# Patient Record
Sex: MALE | Race: WHITE | NOT HISPANIC OR LATINO | Employment: PART TIME | URBAN - METROPOLITAN AREA
[De-identification: names, ages, dates, MRNs, and addresses within clinical notes are randomized per-mention and may not be internally consistent; named-entity substitution may affect disease eponyms.]

---

## 2017-01-06 ENCOUNTER — MYC REFILL (OUTPATIENT)
Dept: INTERNAL MEDICINE | Facility: CLINIC | Age: 72
End: 2017-01-06

## 2017-01-06 DIAGNOSIS — N52.9 IMPOTENCE OF ORGANIC ORIGIN: Primary | ICD-10-CM

## 2017-01-10 RX ORDER — TADALAFIL 5 MG/1
5 TABLET ORAL DAILY
Qty: 90 TABLET | Refills: 3 | Status: SHIPPED | OUTPATIENT
Start: 2017-01-10 | End: 2017-05-26

## 2017-01-10 RX ORDER — TADALAFIL 20 MG/1
TABLET ORAL
Qty: 16 TABLET | Refills: 11 | Status: SHIPPED | OUTPATIENT
Start: 2017-01-10 | End: 2017-05-26

## 2017-01-10 NOTE — TELEPHONE ENCOUNTER
Cialis 20 mg and 5 mg. He would like Rxs printed for mail order pharmacy. Sent him a message back that we can fax to Mail order pharmacy if he prefers.         Last Written Prescription Date: 4/21/16  Last Fill Quantity: 16, 90 # refills: 11, 3     Last Office Visit with [facility], [facility] or [facility] prescribing provider: 4/21/16

## 2017-01-10 NOTE — TELEPHONE ENCOUNTER
He sent back message that uses [facility] so will need to print and sign. Please let him know when this is done

## 2017-01-10 NOTE — TELEPHONE ENCOUNTER
Message from [place]:  Original authorizing provider: Roverto Edwards MD, MD Ryan Alfredo Zarina would like a refill of the following medications:  tadalafil (CIALIS) 20 MG tablet [Roverto Edwards MD, MD]  tadalafil (CIALIS) 5 MG tablet [Roverto Edwards MD, MD]    Preferred pharmacy: Other - see previous note in this request.    Comment:  I Would like t get these filled from via mail order. I believe I need a hard copy in order to accomplish this. I can  hard copy at office when ready. Text message can be sent to my cell phone: [phone]

## 2017-01-15 ENCOUNTER — MYC MEDICAL ADVICE (OUTPATIENT)
Dept: INTERNAL MEDICINE | Facility: CLINIC | Age: 72
End: 2017-01-15

## 2017-01-19 NOTE — TELEPHONE ENCOUNTER
Name of person picking up: Felix     If not patient, relationship to patient:     Type of identification: MYRNA FLORES    DL #: [id number]    What was picked up: Rx

## 2017-04-26 ENCOUNTER — MYC REFILL (OUTPATIENT)
Dept: INTERNAL MEDICINE | Facility: CLINIC | Age: 72
End: 2017-04-26

## 2017-04-26 DIAGNOSIS — I10 BENIGN ESSENTIAL HYPERTENSION: ICD-10-CM

## 2017-04-26 DIAGNOSIS — E78.5 HYPERLIPIDEMIA LDL GOAL <130: ICD-10-CM

## 2017-04-26 DIAGNOSIS — J31.0 CHRONIC RHINITIS: ICD-10-CM

## 2017-04-26 RX ORDER — SIMVASTATIN 40 MG
40 TABLET ORAL AT BEDTIME
Qty: 90 TABLET | Refills: 0 | Status: SHIPPED | OUTPATIENT
Start: 2017-04-26 | End: 2017-05-26

## 2017-04-26 RX ORDER — FLUTICASONE PROPIONATE 50 MCG
2 SPRAY, SUSPENSION (ML) NASAL DAILY
Qty: 48 G | Refills: 0 | Status: SHIPPED | OUTPATIENT
Start: 2017-04-26 | End: 2019-06-11

## 2017-04-26 RX ORDER — HYDROCHLOROTHIAZIDE 25 MG/1
25 TABLET ORAL DAILY
Qty: 90 TABLET | Refills: 0 | Status: SHIPPED | OUTPATIENT
Start: 2017-04-26 | End: 2017-05-26

## 2017-04-26 RX ORDER — ATENOLOL 50 MG/1
50 TABLET ORAL DAILY
Qty: 90 TABLET | Refills: 0 | Status: SHIPPED | OUTPATIENT
Start: 2017-04-26 | End: 2017-05-26

## 2017-05-03 ENCOUNTER — TELEPHONE (OUTPATIENT)
Dept: INTERNAL MEDICINE | Facility: CLINIC | Age: 72
End: 2017-05-03

## 2017-05-03 NOTE — TELEPHONE ENCOUNTER
Panel Management Review      Patient has the following on his problem list:       IVD   ASA: Passed    Last LDL:    Lab Results   Component Value Date    CHOL 118 04/21/2016     Lab Results   Component Value Date    HDL 33 04/21/2016     Lab Results   Component Value Date    LDL 70 04/21/2016     Lab Results   Component Value Date    TRIG 73 04/21/2016        Lab Results   Component Value Date    CHOLHDLRATIO 3.4 03/19/2015        Is the patient on a Statin? YES   Is the patient on Aspirin? YES                  Medications     HMG CoA Reductase Inhibitors    simvastatin (ZOCOR) 40 MG tablet    Salicylates    ASPIRIN 81 MG OR TABS          Last three blood pressure readings:  BP Readings from Last 3 Encounters:   04/21/16 131/82   03/19/15 136/66   03/26/14 122/68        Tobacco History:     History   Smoking Status     Never Smoker   Smokeless Tobacco     Never Used       Hypertension   Last three blood pressure readings:  BP Readings from Last 3 Encounters:   04/21/16 131/82   03/19/15 136/66   03/26/14 122/68     Blood pressure: Passed    HTN Guidelines:  Age 18-59 BP range:  Less than 140/90  Age 60-85 with Diabetes:  Less than 140/90  Age 60-85 without Diabetes:  less than 150/90      Composite cancer screening  Chart review shows that this patient is due/due soon for the following Colonoscopy  Summary:    Patient is due/failing the following:   COLONOSCOPY    Action needed:   Patient needs office visit for COLONOSCOPY      Sent letter.    Questions for provider review:    Soto Tolentino CMA       Chart routed to none .

## 2017-05-26 ENCOUNTER — OFFICE VISIT (OUTPATIENT)
Dept: INTERNAL MEDICINE | Facility: CLINIC | Age: 72
End: 2017-05-26
Payer: MEDICARE

## 2017-05-26 VITALS
SYSTOLIC BLOOD PRESSURE: 134 MMHG | DIASTOLIC BLOOD PRESSURE: 70 MMHG | BODY MASS INDEX: 35.84 KG/M2 | WEIGHT: 242 LBS | OXYGEN SATURATION: 97 % | RESPIRATION RATE: 14 BRPM | HEART RATE: 67 BPM | TEMPERATURE: 98.2 F | HEIGHT: 69 IN

## 2017-05-26 DIAGNOSIS — Z00.00 ROUTINE GENERAL MEDICAL EXAMINATION AT A HEALTH CARE FACILITY: Primary | ICD-10-CM

## 2017-05-26 DIAGNOSIS — N52.9 IMPOTENCE OF ORGANIC ORIGIN: ICD-10-CM

## 2017-05-26 DIAGNOSIS — R73.09 ELEVATED GLUCOSE: ICD-10-CM

## 2017-05-26 DIAGNOSIS — Z12.5 SPECIAL SCREENING FOR MALIGNANT NEOPLASM OF PROSTATE: ICD-10-CM

## 2017-05-26 DIAGNOSIS — Z12.11 SPECIAL SCREENING FOR MALIGNANT NEOPLASMS, COLON: ICD-10-CM

## 2017-05-26 DIAGNOSIS — E78.5 HYPERLIPIDEMIA LDL GOAL <130: ICD-10-CM

## 2017-05-26 DIAGNOSIS — E66.01 MORBID OBESITY, UNSPECIFIED OBESITY TYPE (H): ICD-10-CM

## 2017-05-26 DIAGNOSIS — I10 BENIGN ESSENTIAL HYPERTENSION: ICD-10-CM

## 2017-05-26 LAB
ERYTHROCYTE [DISTWIDTH] IN BLOOD BY AUTOMATED COUNT: 13.2 % (ref 10–15)
HBA1C MFR BLD: 6.3 % (ref 4.3–6)
HCT VFR BLD AUTO: 48.3 % (ref 40–53)
HGB BLD-MCNC: 15.8 G/DL (ref 13.3–17.7)
MCH RBC QN AUTO: 31.6 PG (ref 26.5–33)
MCHC RBC AUTO-ENTMCNC: 32.7 G/DL (ref 31.5–36.5)
MCV RBC AUTO: 97 FL (ref 78–100)
PLATELET # BLD AUTO: 102 10E9/L (ref 150–450)
RBC # BLD AUTO: 5 10E12/L (ref 4.4–5.9)
WBC # BLD AUTO: 3.1 10E9/L (ref 4–11)

## 2017-05-26 PROCEDURE — 83036 HEMOGLOBIN GLYCOSYLATED A1C: CPT | Performed by: INTERNAL MEDICINE

## 2017-05-26 PROCEDURE — G0439 PPPS, SUBSEQ VISIT: HCPCS | Performed by: INTERNAL MEDICINE

## 2017-05-26 PROCEDURE — 80053 COMPREHEN METABOLIC PANEL: CPT | Performed by: INTERNAL MEDICINE

## 2017-05-26 PROCEDURE — G0103 PSA SCREENING: HCPCS | Performed by: INTERNAL MEDICINE

## 2017-05-26 PROCEDURE — 36415 COLL VENOUS BLD VENIPUNCTURE: CPT | Performed by: INTERNAL MEDICINE

## 2017-05-26 PROCEDURE — 84443 ASSAY THYROID STIM HORMONE: CPT | Performed by: INTERNAL MEDICINE

## 2017-05-26 PROCEDURE — 80061 LIPID PANEL: CPT | Performed by: INTERNAL MEDICINE

## 2017-05-26 PROCEDURE — 85027 COMPLETE CBC AUTOMATED: CPT | Performed by: INTERNAL MEDICINE

## 2017-05-26 PROCEDURE — 90670 PCV13 VACCINE IM: CPT | Performed by: INTERNAL MEDICINE

## 2017-05-26 PROCEDURE — G0009 ADMIN PNEUMOCOCCAL VACCINE: HCPCS | Performed by: INTERNAL MEDICINE

## 2017-05-26 RX ORDER — ATENOLOL 50 MG/1
50 TABLET ORAL DAILY
Qty: 90 TABLET | Refills: 3 | Status: SHIPPED | OUTPATIENT
Start: 2017-05-26 | End: 2017-08-09

## 2017-05-26 RX ORDER — TADALAFIL 20 MG/1
TABLET ORAL
Qty: 16 TABLET | Refills: 3 | Status: SHIPPED | OUTPATIENT
Start: 2017-05-26 | End: 2019-06-11

## 2017-05-26 RX ORDER — SIMVASTATIN 40 MG
40 TABLET ORAL AT BEDTIME
Qty: 90 TABLET | Refills: 3 | Status: SHIPPED | OUTPATIENT
Start: 2017-05-26 | End: 2018-05-29

## 2017-05-26 RX ORDER — HYDROCHLOROTHIAZIDE 25 MG/1
25 TABLET ORAL DAILY
Qty: 90 TABLET | Refills: 3 | Status: SHIPPED | OUTPATIENT
Start: 2017-05-26 | End: 2018-05-29

## 2017-05-26 NOTE — NURSING NOTE
"Chief Complaint   Patient presents with     Medicare Visit       Initial /70  Pulse 67  Temp 98.2  F (36.8  C) (Oral)  Resp 14  Ht 5' 9\" (1.753 m)  Wt 242 lb (109.8 kg)  SpO2 97%  BMI 35.74 kg/m2 Estimated body mass index is 35.74 kg/(m^2) as calculated from the following:    Height as of this encounter: 5' 9\" (1.753 m).    Weight as of this encounter: 242 lb (109.8 kg).  Medication Reconciliation: complete   Rajan BAKER      "

## 2017-05-26 NOTE — PROGRESS NOTES
SUBJECTIVE:                                                            Connor Srinivasan is a 72 year old male who presents for Preventive Visit.  Colonoscopy- Patient is due for colonoscopy  Surgery-Upcoming surgery on June 12th and 28th for cataracts.  Pre-op appointment on June 7th with Dr. Moraima Barry at 6:40 am.        Exercise- No scheduled exercise however keeps tracks of walking electronically tries to hit 4-8 miles per day.   Weight has increased 4 pounds since visit on 4/21/16.       Social Hx- Currently works at [employer].    Are you in the first 12 months of your Medicare Part B coverage?  No    Healthy Habits:  Answers for HPI/ROS submitted by the patient on 5/26/2017   Annual Exam:  Getting at least 3 servings of Calcium per day:: Yes  Bi-annual eye exam:: Yes  Dental care twice a year:: Yes  Sleep apnea or symptoms of sleep apnea:: None, Daytime drowsiness  Diet:: Regular (no restrictions)  Frequency of exercise:: None  Taking medications regularly:: Yes  Medication side effects:: None  PHQ-2 Score: 0        COGNITIVE SCREEN  1) Repeat 3 items (Banana, Sunrise, Chair)    2) Clock draw: NORMAL  3) 3 item recall: Recalls 3 objects  Results: 3 items recalled: COGNITIVE IMPAIRMENT LESS LIKELY    Mini-CogTM Copyright S Yadi. Licensed by the author for use in [facility]; reprinted with permission (zoey@.[facility]). All rights reserved.                Reviewed and updated as needed this visit by clinical staff  Tobacco  Allergies  Meds  Med Hx  Surg Hx  Fam Hx  Soc Hx        Reviewed and updated as needed this visit by Provider        Social History   Substance Use Topics     Smoking status: Never Smoker     Smokeless tobacco: Never Used     Alcohol use Yes      Comment: rarely       The patient does not drink >3 drinks per day nor >7 drinks per week.    Today's PHQ-2 Score:   PHQ-2 ( 1999 Pfizer) 5/26/2017 5/26/2017   Q1: Little interest or pleasure in doing things 0 0   Q2: Feeling  down, depressed or hopeless 0 0   PHQ-2 Score 0 0   Q1: Little interest or pleasure in doing things Not at all -   Q2: Feeling down, depressed or hopeless Not at all -   PHQ-2 Score 0 -       Do you feel safe in your environment - Yes    Do you have a Health Care Directive?: No: Advance care planning was reviewed with patient; patient declined at this time.    Current providers sharing in care for this patient include:   Patient Care Team:  Roverto Edwards MD as PCP - General      Hearing impairment: Yes, wears hearing aids    Ability to successfully perform activities of daily living: Yes, no assistance needed     Fall risk:  Fallen 2 or more times in the past year?: No  Any fall with injury in the past year?: No    Home safety:  none identified      The following health maintenance items are reviewed in Epic and correct as of today:  Health Maintenance   Topic Date Due     HEPATITIS C SCREENING  03/20/1963     PNEUMOCOCCAL (1 of 2 - PCV13) 03/20/2010     AORTIC ANEURYSM SCREENING (SYSTEM ASSIGNED)  03/20/2010     INFLUENZA VACCINE (SYSTEM ASSIGNED)  09/01/2017     FALL RISK ASSESSMENT  05/26/2018     LIPID SCREEN Q5 YR MALE (SYSTEM ASSIGNED)  04/21/2021     ADVANCE DIRECTIVE PLANNING Q5 YRS  05/26/2022     COLON CANCER SCREEN (SYSTEM ASSIGNED)  04/21/2026     TETANUS Q10 YR  04/21/2026              ROS:  Constitutional, HEENT, cardiovascular, pulmonary, GI, , musculoskeletal, neuro, skin, endocrine and psych systems are negative, except as otherwise noted.    Pos: Cataracts and arthritis in thumb.  Neg: Fevers, fatigue, chills, nose & sinus issues,hearing changes, cough, difficulty breathing, heart burn, hematochezia, tight chest, dizziness, heart racing, heart skipping, hematuria, dysuria, erectile dysfunction, hemoptysis , muscle pains, skin changes, heat or cold intolerance, frequent thirst or urination, easy bruising, severe frequent headaches, numbness/tingling, and neurological changes.     OBJECTIVE:      "                                                       /70  Pulse 67  Temp 98.2  F (36.8  C) (Oral)  Resp 14  Ht 1.753 m (5' 9\")  Wt 109.8 kg (242 lb)  SpO2 97%  BMI 35.74 kg/m2 Estimated body mass index is 35.74 kg/(m^2) as calculated from the following:    Height as of this encounter: 1.753 m (5' 9\").    Weight as of this encounter: 109.8 kg (242 lb).  EXAM:   GENERAL: healthy, alert and no distress  EYES: Eyes grossly normal to inspection, PERRL and conjunctivae and sclerae normal  HENT: ear canals and TM's normal, nose and mouth without ulcers or lesions  NECK: no adenopathy, no asymmetry, masses, or scars and thyroid normal to palpation  RESP: lungs clear to auscultation - no rales, rhonchi or wheezes  CV: regular rate and rhythm, normal S1 S2, no S3 or S4, no murmur, click or rub, no peripheral edema and peripheral pulses strong   (male): normal male genitalia without lesions or urethral discharge, no hernia  RECTAL: normal sphincter tone, no rectal masses, prostate normal size, smooth, nontender without nodules or masses  ABDOMEN: soft, nontender, no hepatosplenomegaly, no masses and bowel sounds normal  MS: no gross musculoskeletal defects noted, no edema  SKIN: no suspicious lesions or rashes  NEURO: Normal strength and tone, mentation intact and speech normal  PSYCH: mentation appears normal, affect normal/bright    ASSESSMENT / PLAN:                                                                ICD-10-CM    1. Routine general medical examination at a health care facility Z00.00 Comprehensive metabolic panel     Lipid panel reflex to direct LDL     TSH with free T4 reflex     CBC with platelets     Pneumococcal vaccine 13 valent PCV13 IM (Prevnar) [82553]   2. Benign essential hypertension I10 hydrochlorothiazide (HYDRODIURIL) 25 MG tablet     atenolol (TENORMIN) 50 MG tablet   3. Hyperlipidemia LDL goal <130 E78.5 simvastatin (ZOCOR) 40 MG tablet     Comprehensive metabolic panel     Lipid " panel reflex to direct LDL   4. Special screening for malignant neoplasms, colon Z12.11 GASTROENTEROLOGY ADULT REF PROCEDURE ONLY   5. ERECTILE DYSFUNCTION N52.9 tadalafil (CIALIS) 20 MG tablet   6. Elevated glucose R73.09 Hemoglobin A1c   7. Special screening for malignant neoplasm of prostate Z12.5 Prostate spec antigen screen     (Z00.00) Routine general medical examination at a health care facility  (primary encounter diagnosis)  Comment: Stable health. See epic orders.   Plan: Comprehensive metabolic panel, Lipid panel         reflex to direct LDL, TSH with free T4 reflex,         CBC with platelets          MORBID OBESITY (BMI>35 with hypertension)  Continue regular walking, try to increase if possible. Work on food portions especially sweets/starches.     (I10) Benign essential hypertension  Comment:  BP Readings from Last 3 Encounters:   05/26/17 134/70   04/21/16 131/82   03/19/15 136/66   BP at target. Continue current meds.   Plan: hydrochlorothiazide (HYDRODIURIL) 25 MG tablet,        atenolol (TENORMIN) 50 MG tablet    (E78.5) Hyperlipidemia LDL goal <130  Comment:   Recent Labs   Lab Test  04/21/16   1115  03/19/15   1055  03/26/14   0824   CHOL  118  121  121   HDL  33*  36*  33*   LDL  70  67  73   TRIG  73  88  73   CHOLHDLRATIO   --   3.4  3.6   Update lipids.Continue current meds.   Plan: simvastatin (ZOCOR) 40 MG tablet, Comprehensive        metabolic panel, Lipid panel reflex to direct         LDL          (Z12.11) Special screening for malignant neoplasms, colon  Comment: Due for colonoscopy, ordered.   Plan: GASTROENTEROLOGY ADULT REF PROCEDURE ONLY          (N52.9) ERECTILE DYSFUNCTION  Comment: Refilled Cialis.   Plan: tadalafil (CIALIS) 20 MG tablet          (R73.09) Elevated glucose  Comment: Check glucose, A1c.  Plan: Hemoglobin A1c         (Z12.5) Special screening for malignant neoplasm of prostate  Plan: Prostate spec antigen screen            End of Life Planning:  Patient currently  "has an advanced directive: Yes.  Practitioner is supportive of decision.    COUNSELING:  Reviewed preventive health counseling, as reflected in patient instructions    BP Screening:   Last 3 BP Readings:    BP Readings from Last 3 Encounters:   05/26/17 134/70   04/21/16 131/82   03/19/15 136/66       The following was recommended to the patient:  Recommend lifestyle modifications    Estimated body mass index is 35.74 kg/(m^2) as calculated from the following:    Height as of this encounter: 1.753 m (5' 9\").    Weight as of this encounter: 109.8 kg (242 lb).  Weight management plan: Discussed healthy diet and exercise guidelines and patient will follow up in 12 months in clinic to re-evaluate.   reports that he has never smoked. He has never used smokeless tobacco.      Appropriate preventive services were discussed with this patient, including applicable screening as appropriate for cardiovascular disease, diabetes, osteopenia/osteoporosis, and glaucoma.  As appropriate for age/gender, discussed screening for colorectal cancer, prostate cancer, breast cancer, and cervical cancer. Checklist reviewing preventive services available has been given to the patient.    Reviewed patients plan of care and provided an AVS. The Basic Care Plan (routine screening as documented in Health Maintenance) for Connor meets the Care Plan requirement. This Care Plan has been established and reviewed with the Patient.       Patient Instructions   Everything looks fine!    Refills of medications have been faxed to your pharmacy.     I'll get back to you with lab results soon, especially if there is anything of concern.      See you in a year (unless labs return in diabetes range), sooner if problems.        Counseling Resources:  ATP IV Guidelines  Pooled Cohorts Equation Calculator  Breast Cancer Risk Calculator  FRAX Risk Assessment  ICSI Preventive Guidelines  Dietary Guidelines for Americans, 2010  USDA's MyPlate  ASA " Prophylaxis  Lung CA Screening    Roverto Edwards MD,   [facility]    This document serves as a record of the services and decisions personally performed and made by Roverto Edwards MD. It was created on their behalf by Adi Bonilla, a trained medical scribe. The creation of this document is based the provider's statements to the medical scribe.  Adi Bonilla May 26, 2017 11:00 AM

## 2017-05-26 NOTE — MR AVS SNAPSHOT
After Visit Summary   5/26/2017    Connor Srinivasan    MRN: [id number]           Patient Information     Date Of Birth          1945        Visit Information        Provider Department      5/26/2017 9:00 AM Roverto Edwards MD [facility]        Today's Diagnoses     Routine general medical examination at a health care facility    -  1    Benign essential hypertension        Hyperlipidemia LDL goal <130        Special screening for malignant neoplasms, colon        ERECTILE DYSFUNCTION        Elevated glucose        Special screening for malignant neoplasm of prostate          Care Instructions      Preventive Health Recommendations:       Male Ages 65 and over    Yearly exam:             See your health care provider every year in order to  o   Review health changes.   o   Discuss preventive care.    o   Review your medicines if your doctor has prescribed any.    Talk with your health care provider about whether you should have a test to screen for prostate cancer (PSA).    Every 3 years, have a diabetes test (fasting glucose). If you are at risk for diabetes, you should have this test more often.    Every 5 years, have a cholesterol test. Have this test more often if you are at risk for high cholesterol or heart disease.     Every 10 years, have a colonoscopy. Or, have a yearly FIT test (stool test). These exams will check for colon cancer.    Talk to with your health care provider about screening for Abdominal Aortic Aneurysm if you have a family history of AAA or have a history of smoking.  Shots:     Get a flu shot each year.     Get a tetanus shot every 10 years.     Talk to your doctor about your pneumonia vaccines. There are now two you should receive - Pneumovax (PPSV 23) and Prevnar (PCV 13).    Talk to your doctor about a shingles vaccine.     Talk to your doctor about the hepatitis B vaccine.  Nutrition:     Eat at least 5 servings of fruits and vegetables each  day.     Eat whole-grain bread, whole-wheat pasta and brown rice instead of white grains and rice.     Talk to your doctor about Calcium and Vitamin D.   Lifestyle    Exercise for at least 150 minutes a week (30 minutes a day, 5 days a week). This will help you control your weight and prevent disease.     Limit alcohol to one drink per day.     No smoking.     Wear sunscreen to prevent skin cancer.     See your dentist every six months for an exam and cleaning.     See your eye doctor every 1 to 2 years to screen for conditions such as glaucoma, macular degeneration and cataracts.      Everything looks fine!    Refills of medications have been faxed to your pharmacy.     I'll get back to you with lab results soon, especially if there is anything of concern.      See you in a year (unless labs return in diabetes range), sooner if problems.            Follow-ups after your visit        Additional Services     GASTROENTEROLOGY ADULT REF PROCEDURE ONLY       Last Lab Result: Creatinine (mg/dL)       Date                     Value                 04/21/2016               0.82             ----------  Body mass index is 35.74 kg/(m^2).      Patient will be contacted to schedule procedure.     Please be aware that coverage of these services is subject to the terms and limitations of your health insurance plan.  Call member services at your health plan with any benefit or coverage questions.  Any procedures must be performed at a [facility] facility OR coordinated by your clinic's referral office.    Please bring the following with you to your appointment:    (1) Any X-Rays, CTs or MRIs which have been performed.  Contact the facility where they were done to arrange for  prior to your scheduled appointment.    (2) List of current medications   (3) This referral request   (4) Any documents/labs given to you for this referral                  Your next 10 appointments already scheduled     Jun 07, 2017  6:40 AM CDT    [facility] with Dilma [facility] NP   [facility]    [address]  [facility] [id number] [phone]            Jun 12, 2017   Procedure with John Sanchez MD   [facility] (--)    [address]  [place] [address] [phone]            Jun 28, 2017   Procedure with John Sanchez MD   [facility] (--)    [address]  [facility] [address] [phone]              Who to contact     If you have questions or need follow up information about today's clinic visit or your schedule please contact [facility] directly at [phone].  Normal or non-critical lab and imaging results will be communicated to you by MyChart, letter or phone within 4 business days after the clinic has received the results. If you do not hear from us within 7 days, please contact the clinic through [employer] or phone. If you have a critical or abnormal lab result, we will notify you by phone as soon as possible.  Submit refill requests through [facility] or call your pharmacy and they will forward the refill request to us. Please allow 3 business days for your refill to be completed.          Additional Information About Your Visit        MyChart Information     [facility] gives you secure access to your electronic health record. If you see a primary care provider, you can also send messages to your care team and make appointments. If you have questions, please call your primary care clinic.  If you do not have a primary care provider, please call [phone] and they will assist you.        Care EveryWhere ID     This is your Care EveryWhere ID. This could be used by other organizations to access your [facility] medical records  [id number]        Your Vitals Were     Pulse Temperature Respirations Height Pulse Oximetry BMI (Body Mass Index)    67 98.2  F (36.8  C)  "(Oral) 14 5' 9\" (1.753 m) 97% 35.74 kg/m2       Blood Pressure from Last 3 Encounters:   05/26/17 134/70   04/21/16 131/82   03/19/15 136/66    Weight from Last 3 Encounters:   05/26/17 242 lb (109.8 kg)   04/21/16 238 lb (108 kg)   03/19/15 235 lb 3.2 oz (106.7 kg)              We Performed the Following     CBC with platelets     Comprehensive metabolic panel     GASTROENTEROLOGY ADULT REF PROCEDURE ONLY     Hemoglobin A1c     Lipid panel reflex to direct LDL     Prostate spec antigen screen     TSH with free T4 reflex          Where to get your medicines      These medications were sent to [id number] - [place], [place] - [address]  [address], [facility] [address]     Phone:  [phone]     atenolol 50 MG tablet    hydrochlorothiazide 25 MG tablet    simvastatin 40 MG tablet    tadalafil 20 MG tablet          Primary Care Provider Office Phone # Fax #    Roverto Edwards [phone]       [facility] [address]  [facility] [address]        Thank you!     Thank you for choosing [facility]  for your care. Our goal is always to provide you with excellent care. Hearing back from our patients is one way we can continue to improve our services. Please take a few minutes to complete the written survey that you may receive in the mail after your visit with us. Thank you!             Your Updated Medication List - Protect others around you: Learn how to safely use, store and throw away your medicines at www.disposemymeds.org.          This list is accurate as of: 5/26/17  9:46 AM.  Always use your most recent med list.                   Brand Name Dispense Instructions for use    aspirin 81 MG tablet     100    ONE DAILY       atenolol 50 MG tablet    TENORMIN    90 tablet    Take 1 tablet (50 mg) by mouth daily       fluticasone 50 MCG/ACT spray    FLONASE    48 g    Spray 2 sprays into both nostrils daily       hydrochlorothiazide 25 MG " tablet    HYDRODIURIL    90 tablet    Take 1 tablet (25 mg) by mouth daily       sildenafil 100 MG cap/tab    REVATIO/VIAGRA    16 tablet    Take 1 tablet (100 mg) by mouth daily as needed for erectile dysfunction Take at least 30 min to 4 hours before intercourse.       simvastatin 40 MG tablet    ZOCOR    90 tablet    Take 1 tablet (40 mg) by mouth At Bedtime       * tadalafil 5 MG tablet    CIALIS    30 tablet    Take 1 tablet (5 mg) by mouth daily       * tadalafil 20 MG tablet    CIALIS    16 tablet    Take one tablet by mouth. TAKE AT LEAST 30 MINUTES BEFORE INTERCOURSE,       * Notice:  This list has 2 medication(s) that are the same as other medications prescribed for you. Read the directions carefully, and ask your doctor or other care provider to review them with you.

## 2017-05-26 NOTE — PATIENT INSTRUCTIONS
Everything looks fine!    Refills of medications have been faxed to your pharmacy.     I'll get back to you with lab results soon, especially if there is anything of concern.      See you in a year (unless labs return in diabetes range), sooner if problems.

## 2017-05-27 LAB
ALBUMIN SERPL-MCNC: 3.9 G/DL (ref 3.4–5)
ALP SERPL-CCNC: 93 U/L (ref 40–150)
ALT SERPL W P-5'-P-CCNC: 62 U/L (ref 0–70)
ANION GAP SERPL CALCULATED.3IONS-SCNC: 10 MMOL/L (ref 3–14)
AST SERPL W P-5'-P-CCNC: 29 U/L (ref 0–45)
BILIRUB SERPL-MCNC: 0.9 MG/DL (ref 0.2–1.3)
BUN SERPL-MCNC: 20 MG/DL (ref 7–30)
CALCIUM SERPL-MCNC: 9 MG/DL (ref 8.5–10.1)
CHLORIDE SERPL-SCNC: 108 MMOL/L (ref 94–109)
CHOLEST SERPL-MCNC: 117 MG/DL
CO2 SERPL-SCNC: 24 MMOL/L (ref 20–32)
CREAT SERPL-MCNC: 0.73 MG/DL (ref 0.66–1.25)
GFR SERPL CREATININE-BSD FRML MDRD: ABNORMAL ML/MIN/1.7M2
GLUCOSE SERPL-MCNC: 133 MG/DL (ref 70–99)
HDLC SERPL-MCNC: 26 MG/DL
LDLC SERPL CALC-MCNC: 69 MG/DL
NONHDLC SERPL-MCNC: 91 MG/DL
POTASSIUM SERPL-SCNC: 4 MMOL/L (ref 3.4–5.3)
PROT SERPL-MCNC: 6.4 G/DL (ref 6.8–8.8)
PSA SERPL-ACNC: 0.7 UG/L (ref 0–4)
SODIUM SERPL-SCNC: 142 MMOL/L (ref 133–144)
TRIGL SERPL-MCNC: 108 MG/DL
TSH SERPL DL<=0.005 MIU/L-ACNC: 2.03 MU/L (ref 0.4–4)

## 2017-05-28 PROBLEM — E66.01 MORBID OBESITY, UNSPECIFIED OBESITY TYPE (H): Status: ACTIVE | Noted: 2017-05-28

## 2017-06-07 ENCOUNTER — OFFICE VISIT (OUTPATIENT)
Dept: INTERNAL MEDICINE | Facility: CLINIC | Age: 72
End: 2017-06-07
Payer: MEDICARE

## 2017-06-07 VITALS
HEART RATE: 75 BPM | SYSTOLIC BLOOD PRESSURE: 114 MMHG | TEMPERATURE: 98.1 F | OXYGEN SATURATION: 95 % | DIASTOLIC BLOOD PRESSURE: 70 MMHG | RESPIRATION RATE: 16 BRPM | HEIGHT: 69 IN | WEIGHT: 242.8 LBS | BODY MASS INDEX: 35.96 KG/M2

## 2017-06-07 DIAGNOSIS — Z01.818 PREOP GENERAL PHYSICAL EXAM: Primary | ICD-10-CM

## 2017-06-07 PROCEDURE — 99213 OFFICE O/P EST LOW 20 MIN: CPT | Performed by: NURSE PRACTITIONER

## 2017-06-07 NOTE — MR AVS SNAPSHOT
After Visit Summary   6/7/2017    Connor Srinivasan    MRN: [id number]           Patient Information     Date Of Birth          1945        Visit Information        Provider Department      6/7/2017 6:40 AM Dlima Barry NP [facility]        Today's Diagnoses     Preop general physical exam    -  1      Care Instructions      Before Your Surgery      Call your surgeon if there is any change in your health. This includes signs of a cold or flu (such as a sore throat, runny nose, cough, rash or fever).    Do not smoke, drink alcohol or take over the counter medicine (unless your surgeon or primary care doctor tells you to) for the 24 hours before and after surgery.    If you take prescribed drugs: Follow your doctor s orders about which medicines to take and which to stop until after surgery.    Eating and drinking prior to surgery: follow the instructions from your surgeon    Take a shower or bath the night before surgery. Use the soap your surgeon gave you to gently clean your skin. If you do not have soap from your surgeon, use your regular soap. Do not shave or scrub the surgery site.  Wear clean pajamas and have clean sheets on your bed.           Follow-ups after your visit        Your next 10 appointments already scheduled     Jun 12, 2017   Procedure with John Sanchez MD   [facility] (--)    [address]  [facility] [id number] [phone]            Jun 28, 2017   Procedure with John Sanchez MD   [facility] (--)    [address]  [facility] [id number] [phone]              Who to contact     If you have questions or need follow up information about today's clinic visit or your schedule please contact [facility] directly at [phone].  Normal or non-critical lab and imaging results will be communicated to you by MyChart, letter or phone within  "4 business days after the clinic has received the results. If you do not hear from us within 7 days, please contact the clinic through [employer] or phone. If you have a critical or abnormal lab result, we will notify you by phone as soon as possible.  Submit refill requests through [employer] or call your pharmacy and they will forward the refill request to us. Please allow 3 business days for your refill to be completed.          Additional Information About Your Visit        [employer] Information     [employer] gives you secure access to your electronic health record. If you see a primary care provider, you can also send messages to your care team and make appointments. If you have questions, please call your primary care clinic.  If you do not have a primary care provider, please call [phone] and they will assist you.        Care EveryWhere ID     This is your Care EveryWhere ID. This could be used by other organizations to access your [facility] medical records  [id number]        Your Vitals Were     Pulse Temperature Respirations Height Pulse Oximetry BMI (Body Mass Index)    75 98.1  F (36.7  C) (Oral) 16 5' 9\" (1.753 m) 95% 35.86 kg/m2       Blood Pressure from Last 3 Encounters:   06/07/17 114/70   05/26/17 134/70   04/21/16 131/82    Weight from Last 3 Encounters:   06/07/17 242 lb 12.8 oz (110.1 kg)   05/26/17 242 lb (109.8 kg)   04/21/16 238 lb (108 kg)              Today, you had the following     No orders found for display       Primary Care Provider Office Phone # Fax #    Roverto Edwards [phone]       [facility] [address] [facility] [address]  [facility] [address]        Thank you!     Thank you for choosing [facility]  for your care. Our goal is always to provide you with excellent care. Hearing back from our patients is one way we can continue to improve our services. Please take a few minutes to complete the written survey that you may receive in the mail after " your visit with us. Thank you!             Your Updated Medication List - Protect others around you: Learn how to safely use, store and throw away your medicines at www.disposemymeds.org.          This list is accurate as of: 6/7/17  7:22 AM.  Always use your most recent med list.                   Brand Name Dispense Instructions for use    aspirin 81 MG tablet     100    ONE DAILY       atenolol 50 MG tablet    TENORMIN    90 tablet    Take 1 tablet (50 mg) by mouth daily       fluticasone 50 MCG/ACT spray    FLONASE    48 g    Spray 2 sprays into both nostrils daily       hydrochlorothiazide 25 MG tablet    HYDRODIURIL    90 tablet    Take 1 tablet (25 mg) by mouth daily       sildenafil 100 MG cap/tab    REVATIO/VIAGRA    16 tablet    Take 1 tablet (100 mg) by mouth daily as needed for erectile dysfunction Take at least 30 min to 4 hours before intercourse.       simvastatin 40 MG tablet    ZOCOR    90 tablet    Take 1 tablet (40 mg) by mouth At Bedtime       * tadalafil 5 MG tablet    CIALIS    30 tablet    Take 1 tablet (5 mg) by mouth daily       * tadalafil 20 MG tablet    CIALIS    16 tablet    Take one tablet by mouth. TAKE AT LEAST 30 MINUTES BEFORE INTERCOURSE,       * Notice:  This list has 2 medication(s) that are the same as other medications prescribed for you. Read the directions carefully, and ask your doctor or other care provider to review them with you.

## 2017-06-07 NOTE — PROGRESS NOTES
[place] [address]  [facility] [address]  [phone]  Dept: [phone]    PRE-OP EVALUATION:  Today's date: 2017    Connor Srinivasan (: 1945) presents for pre-operative evaluation assessment as requested by Dr. BENSON Jama.  He requires evaluation and anesthesia risk assessment prior to undergoing surgery/procedure for treatment of R eye cataract .  Proposed procedure: R ey cataract extraction    Date of Surgery/ Procedure: 17 and 17  Time of Surgery/ Procedure: 9 am on 17 and 1:00 on 17  Hospital/Surgical Facility: [facility]  Primary Physician: Roverto Edwards  Type of Anesthesia Anticipated: to be determined    Patient has a Health Care Directive or Living Will:  NO    Preop Questions 2017   1.  Do you have a history of heart attack, stroke, stent, bypass or surgery on an artery in the head, neck, heart or legs? No   2.  Do you ever have any pain or discomfort in your chest? No   3.  Do you have a history of  Heart Failure? No   4.   Are you troubled by shortness of breath when:  walking on a level surface, or up a slight hill, or at night? No   5.  Do you currently have a cold, bronchitis or other respiratory infection? No   6.  Do you have a cough, shortness of breath, or wheezing? No   7.  Do you sometimes get pains in the calves of your legs when you walk? No   8. Do you or anyone in your family have previous history of blood clots? No   9.  Do you or does anyone in your family have a serious bleeding problem such as prolonged bleeding following surgeries or cuts? No   10. Have you ever had problems with anemia or been told to take iron pills? No   11. Have you had any abnormal blood loss such as black, tarry or bloody stools? No   12. Have you ever had a blood transfusion? No   13. Have you or any of your relatives ever had problems with anesthesia? No   14. Do you have sleep apnea, excessive snoring or daytime drowsiness? No    15. Do you have any prosthetic heart valves? No   16. Do you have prosthetic joints? YES - bilateral knee replacements         HPI:                                                      Brief HPI related to upcoming procedure: R eye cataract      HYPERTENSION - Patient has longstanding history of mod-severe HTN , currently denies any symptoms referable to elevated blood pressure. Specifically denies chest pain, palpitations, dyspnea, orthopnea, PND or peripheral edema. Blood pressure readings have been in normal range. Current medication regimen is as listed below. Patient denies any side effects of medication.                                                                                                                                                                                          .  HYPERLIPIDEMIA - Patient has a long history of significant Hyperlipidemia requiring medication for treatment with recent good control. Patient reports no problems or side effects with the medication.                                                                                                                                                       .    MEDICAL HISTORY:                                                      Patient Active Problem List    Diagnosis Date Noted     Obesity, BMI>35 with comorbidity 05/28/2017     Priority: Medium     Other abnormal glucose 07/03/2016     Priority: Medium     Health Care Home 02/29/2012     Priority: Medium     FPA [employer] for .  Vonda Scott RN-BSN, [facility]  [phone]   DX V65.8 REPLACED WITH [id number]ALTH CARE HOME (04/08/2013)       Advanced directives, counseling/discussion 04/29/2011     Priority: Medium     Parent voices understanding and acceptance of this advice and will call back if any further questions or concerns.         HYPERLIPIDEMIA LDL GOAL <130 10/31/2010     Priority: Medium     Overweight 04/29/2009     Priority: Medium     ERECTILE  DYSFUNCTION 01/18/2008     Priority: Medium     Chronic rhinitis      Priority: Medium     Essential hypertension 11/23/2004     Priority: Medium     Problem list name updated by automated process. Provider to review       Localized osteoarthrosis, lower leg 11/23/2004     Priority: Medium     Problem list name updated by automated process. Provider to review        Past Medical History:   Diagnosis Date     Chronic rhinitis      Localized osteoarthrosis not specified whether primary or secondary, lower leg      Onychomycoses      Other and unspecified hyperlipidemia      Unspecified essential hypertension      Past Surgical History:   Procedure Laterality Date     C TOTAL KNEE ARTHROPLASTY  1/2004    Knee Replacement, Total (Right)     C TOTAL KNEE ARTHROPLASTY  10/2003    Knee Replacement, Total (Left)     HC COLONOSCOPY THRU STOMA, DIAGNOSTIC  4/3/2006    Normal     HC EXCISION PILONIDAL LESION SIMPLE  1966     Current Outpatient Prescriptions   Medication Sig Dispense Refill     tadalafil (CIALIS) 20 MG tablet Take one tablet by mouth. TAKE AT LEAST 30 MINUTES BEFORE INTERCOURSE, 16 tablet 3     hydrochlorothiazide (HYDRODIURIL) 25 MG tablet Take 1 tablet (25 mg) by mouth daily 90 tablet 3     atenolol (TENORMIN) 50 MG tablet Take 1 tablet (50 mg) by mouth daily 90 tablet 3     simvastatin (ZOCOR) 40 MG tablet Take 1 tablet (40 mg) by mouth At Bedtime 90 tablet 3     fluticasone (FLONASE) 50 MCG/ACT spray Spray 2 sprays into both nostrils daily 48 g 0     sildenafil (VIAGRA) 100 MG tablet Take 1 tablet (100 mg) by mouth daily as needed for erectile dysfunction Take at least 30 min to 4 hours before intercourse. 16 tablet 11     tadalafil (CIALIS) 5 MG tablet Take 1 tablet (5 mg) by mouth daily 30 tablet 1     ASPIRIN 81 MG OR TABS ONE DAILY 100 3     OTC products: None, except as noted above    Allergies   Allergen Reactions     No Known Drug Allergies       Latex Allergy: NO    Social History   Substance Use  "Topics     Smoking status: Never Smoker     Smokeless tobacco: Never Used     Alcohol use Yes      Comment: rarely     History   Drug Use No       REVIEW OF SYSTEMS:                                                    C: NEGATIVE for fever, chills, change in weight  E/M: NEGATIVE for ear, mouth and throat problems  R: NEGATIVE for significant cough or SOB  CV: NEGATIVE for chest pain, palpitations or peripheral edema  GI: NEGATIVE for nausea, abdominal pain, heartburn, or change in bowel habits  : NEGATIVE for frequency, dysuria, or hematuria  M: NEGATIVE for significant arthralgias or myalgia  N: NEGATIVE for weakness, dizziness or paresthesias  E: NEGATIVE for temperature intolerance, skin/hair changes  H: NEGATIVE for bleeding problems  P: NEGATIVE for changes in mood or affect    EXAM:                                                    /70 (BP Location: Right arm, Patient Position: Chair, Cuff Size: Adult Large)  Pulse 75  Temp 98.1  F (36.7  C) (Oral)  Resp 16  Ht 5' 9\" (1.753 m)  Wt 242 lb 12.8 oz (110.1 kg)  SpO2 95%  BMI 35.86 kg/m2    GENERAL APPEARANCE: obese male     NECK: no adenopathy, no asymmetry, masses, or scars and thyroid normal to palpation     RESP: lungs clear to auscultation - no rales, rhonchi or wheezes     CV: regular rates and rhythm, normal S1 S2, no S3 or S4 and no murmur, click or rub     ABDOMEN:  soft, nontender, no HSM or masses and bowel sounds normal     MS: extremities normal- no gross deformities noted, no evidence of inflammation in joints, FROM in all extremities.     NEURO: Normal strength and tone, sensory exam grossly normal, mentation intact and speech normal     PSYCH: mentation appears normal. and affect normal/bright    DIAGNOSTICS:                                                    No labs or EKG required for low risk surgery (cataract, skin procedure, breast biopsy, etc)    Recent Labs   Lab Test  05/26/17   1003  04/21/16   1115   04/29/11   0906   HGB  " 15.8  16.2   < >  17.5   PLT  102*  112*   < >  114*   NA  142  141   < >  143   POTASSIUM  4.0  4.5   < >  4.3   CR  0.73  0.82   < >  0.92   A1C  6.3*   --    --   6.7*    < > = values in this interval not displayed.        IMPRESSION:                                                    Reason for surgery/procedure: R eye cataract    The proposed surgical procedure is considered LOW risk.    REVISED CARDIAC RISK INDEX  The patient has the following serious cardiovascular risks for perioperative complications such as (MI, PE, VFib and 3  AV Block):  No serious cardiac risks  INTERPRETATION: 0 risks: Class I (very low risk - 0.4% complication rate)    The patient has the following additional risks for perioperative complications:  No identified additional risks    No diagnosis found.    RECOMMENDATIONS:                                                      --Consult hospital rounder / IM to assist post-op medical management    --Patient is to take all scheduled medications on the day of surgery EXCEPT for modifications listed below.    APPROVAL GIVEN to proceed with proposed procedure, without further diagnostic evaluation       Signed Electronically by: Dilma Brary NP    Copy of this evaluation report is provided to requesting physician.    [facility] Preop Guidelines

## 2017-06-07 NOTE — NURSING NOTE
"Chief Complaint   Patient presents with     Pre-Op Exam     cataracts - rt on 06/12/17 then left side on 06/28/17.       Initial /70 (BP Location: Right arm, Patient Position: Chair, Cuff Size: Adult Large)  Pulse 75  Temp 98.1  F (36.7  C) (Oral)  Resp 16  Ht 5' 9\" (1.753 m)  Wt 242 lb 12.8 oz (110.1 kg)  SpO2 95%  BMI 35.86 kg/m2 Estimated body mass index is 35.86 kg/(m^2) as calculated from the following:    Height as of this encounter: 5' 9\" (1.753 m).    Weight as of this encounter: 242 lb 12.8 oz (110.1 kg).  Medication Reconciliation: complete    "

## 2017-06-08 NOTE — H&P (VIEW-ONLY)
SUBJECTIVE:                                                            Connor Srinivasan is a 72 year old male who presents for Preventive Visit.  Colonoscopy- Patient is due for colonoscopy  Surgery-Upcoming surgery on June 12th and 28th for cataracts.  Pre-op appointment on June 7th with Dr. Moraima Barry at 6:40 am.        Exercise- No scheduled exercise however keeps tracks of walking electronically tries to hit 4-8 miles per day.   Weight has increased 4 pounds since visit on 4/21/16.       Social Hx- Currently works at [employer].    Are you in the first 12 months of your Medicare Part B coverage?  No    Healthy Habits:  Answers for HPI/ROS submitted by the patient on 5/26/2017   Annual Exam:  Getting at least 3 servings of Calcium per day:: Yes  Bi-annual eye exam:: Yes  Dental care twice a year:: Yes  Sleep apnea or symptoms of sleep apnea:: None, Daytime drowsiness  Diet:: Regular (no restrictions)  Frequency of exercise:: None  Taking medications regularly:: Yes  Medication side effects:: None  PHQ-2 Score: 0        COGNITIVE SCREEN  1) Repeat 3 items (Banana, Sunrise, Chair)    2) Clock draw: NORMAL  3) 3 item recall: Recalls 3 objects  Results: 3 items recalled: COGNITIVE IMPAIRMENT LESS LIKELY    Mini-CogTM Copyright S Yadi. Licensed by the author for use in [facility]; reprinted with permission (zoey@.[facility]). All rights reserved.                Reviewed and updated as needed this visit by clinical staff  Tobacco  Allergies  Meds  Med Hx  Surg Hx  Fam Hx  Soc Hx        Reviewed and updated as needed this visit by Provider        Social History   Substance Use Topics     Smoking status: Never Smoker     Smokeless tobacco: Never Used     Alcohol use Yes      Comment: rarely       The patient does not drink >3 drinks per day nor >7 drinks per week.    Today's PHQ-2 Score:   PHQ-2 ( 1999 Pf[employer]) 5/26/2017 5/26/2017   Q1: Little interest or pleasure in doing things 0 0   Q2: Feeling  down, depressed or hopeless 0 0   PHQ-2 Score 0 0   Q1: Little interest or pleasure in doing things Not at all -   Q2: Feeling down, depressed or hopeless Not at all -   PHQ-2 Score 0 -       Do you feel safe in your environment - Yes    Do you have a Health Care Directive?: No: Advance care planning was reviewed with patient; patient declined at this time.    Current providers sharing in care for this patient include:   Patient Care Team:  Roverto Edwards MD as PCP - General      Hearing impairment: Yes, wears hearing aids    Ability to successfully perform activities of daily living: Yes, no assistance needed     Fall risk:  Fallen 2 or more times in the past year?: No  Any fall with injury in the past year?: No    Home safety:  none identified      The following health maintenance items are reviewed in Epic and correct as of today:  Health Maintenance   Topic Date Due     HEPATITIS C SCREENING  03/20/1963     PNEUMOCOCCAL (1 of 2 - PCV13) 03/20/2010     AORTIC ANEURYSM SCREENING (SYSTEM ASSIGNED)  03/20/2010     INFLUENZA VACCINE (SYSTEM ASSIGNED)  09/01/2017     FALL RISK ASSESSMENT  05/26/2018     LIPID SCREEN Q5 YR MALE (SYSTEM ASSIGNED)  04/21/2021     ADVANCE DIRECTIVE PLANNING Q5 YRS  05/26/2022     COLON CANCER SCREEN (SYSTEM ASSIGNED)  04/21/2026     TETANUS Q10 YR  04/21/2026              ROS:  Constitutional, HEENT, cardiovascular, pulmonary, GI, , musculoskeletal, neuro, skin, endocrine and psych systems are negative, except as otherwise noted.    Pos: Cataracts and arthritis in thumb.  Neg: Fevers, fatigue, chills, nose & sinus issues,hearing changes, cough, difficulty breathing, heart burn, hematochezia, tight chest, dizziness, heart racing, heart skipping, hematuria, dysuria, erectile dysfunction, hemoptysis , muscle pains, skin changes, heat or cold intolerance, frequent thirst or urination, easy bruising, severe frequent headaches, numbness/tingling, and neurological changes.     OBJECTIVE:      "                                                       /70  Pulse 67  Temp 98.2  F (36.8  C) (Oral)  Resp 14  Ht 1.753 m (5' 9\")  Wt 109.8 kg (242 lb)  SpO2 97%  BMI 35.74 kg/m2 Estimated body mass index is 35.74 kg/(m^2) as calculated from the following:    Height as of this encounter: 1.753 m (5' 9\").    Weight as of this encounter: 109.8 kg (242 lb).  EXAM:   GENERAL: healthy, alert and no distress  EYES: Eyes grossly normal to inspection, PERRL and conjunctivae and sclerae normal  HENT: ear canals and TM's normal, nose and mouth without ulcers or lesions  NECK: no adenopathy, no asymmetry, masses, or scars and thyroid normal to palpation  RESP: lungs clear to auscultation - no rales, rhonchi or wheezes  CV: regular rate and rhythm, normal S1 S2, no S3 or S4, no murmur, click or rub, no peripheral edema and peripheral pulses strong   (male): normal male genitalia without lesions or urethral discharge, no hernia  RECTAL: normal sphincter tone, no rectal masses, prostate normal size, smooth, nontender without nodules or masses  ABDOMEN: soft, nontender, no hepatosplenomegaly, no masses and bowel sounds normal  MS: no gross musculoskeletal defects noted, no edema  SKIN: no suspicious lesions or rashes  NEURO: Normal strength and tone, mentation intact and speech normal  PSYCH: mentation appears normal, affect normal/bright    ASSESSMENT / PLAN:                                                                ICD-10-CM    1. Routine general medical examination at a health care facility Z00.00 Comprehensive metabolic panel     Lipid panel reflex to direct LDL     TSH with free T4 reflex     CBC with platelets     Pneumococcal vaccine 13 valent PCV13 IM (Prevnar) [40345]   2. Benign essential hypertension I10 hydrochlorothiazide (HYDRODIURIL) 25 MG tablet     atenolol (TENORMIN) 50 MG tablet   3. Hyperlipidemia LDL goal <130 E78.5 simvastatin (ZOCOR) 40 MG tablet     Comprehensive metabolic panel     Lipid " panel reflex to direct LDL   4. Special screening for malignant neoplasms, colon Z12.11 GASTROENTEROLOGY ADULT REF PROCEDURE ONLY   5. ERECTILE DYSFUNCTION N52.9 tadalafil (CIALIS) 20 MG tablet   6. Elevated glucose R73.09 Hemoglobin A1c   7. Special screening for malignant neoplasm of prostate Z12.5 Prostate spec antigen screen     (Z00.00) Routine general medical examination at a health care facility  (primary encounter diagnosis)  Comment: Stable health. See epic orders.   Plan: Comprehensive metabolic panel, Lipid panel         reflex to direct LDL, TSH with free T4 reflex,         CBC with platelets          MORBID OBESITY (BMI>35 with hypertension)  Continue regular walking, try to increase if possible. Work on food portions especially sweets/starches.     (I10) Benign essential hypertension  Comment:  BP Readings from Last 3 Encounters:   05/26/17 134/70   04/21/16 131/82   03/19/15 136/66   BP at target. Continue current meds.   Plan: hydrochlorothiazide (HYDRODIURIL) 25 MG tablet,        atenolol (TENORMIN) 50 MG tablet    (E78.5) Hyperlipidemia LDL goal <130  Comment:   Recent Labs   Lab Test  04/21/16   1115  03/19/15   1055  03/26/14   0824   CHOL  118  121  121   HDL  33*  36*  33*   LDL  70  67  73   TRIG  73  88  73   CHOLHDLRATIO   --   3.4  3.6   Update lipids.Continue current meds.   Plan: simvastatin (ZOCOR) 40 MG tablet, Comprehensive        metabolic panel, Lipid panel reflex to direct         LDL          (Z12.11) Special screening for malignant neoplasms, colon  Comment: Due for colonoscopy, ordered.   Plan: GASTROENTEROLOGY ADULT REF PROCEDURE ONLY          (N52.9) ERECTILE DYSFUNCTION  Comment: Refilled Cialis.   Plan: tadalafil (CIALIS) 20 MG tablet          (R73.09) Elevated glucose  Comment: Check glucose, A1c.  Plan: Hemoglobin A1c         (Z12.5) Special screening for malignant neoplasm of prostate  Plan: Prostate spec antigen screen            End of Life Planning:  Patient currently  "has an advanced directive: Yes.  Practitioner is supportive of decision.    COUNSELING:  Reviewed preventive health counseling, as reflected in patient instructions    BP Screening:   Last 3 BP Readings:    BP Readings from Last 3 Encounters:   05/26/17 134/70   04/21/16 131/82   03/19/15 136/66       The following was recommended to the patient:  Recommend lifestyle modifications    Estimated body mass index is 35.74 kg/(m^2) as calculated from the following:    Height as of this encounter: 1.753 m (5' 9\").    Weight as of this encounter: 109.8 kg (242 lb).  Weight management plan: Discussed healthy diet and exercise guidelines and patient will follow up in 12 months in clinic to re-evaluate.   reports that he has never smoked. He has never used smokeless tobacco.      Appropriate preventive services were discussed with this patient, including applicable screening as appropriate for cardiovascular disease, diabetes, osteopenia/osteoporosis, and glaucoma.  As appropriate for age/gender, discussed screening for colorectal cancer, prostate cancer, breast cancer, and cervical cancer. Checklist reviewing preventive services available has been given to the patient.    Reviewed patients plan of care and provided an AVS. The Basic Care Plan (routine screening as documented in Health Maintenance) for Connor meets the Care Plan requirement. This Care Plan has been established and reviewed with the Patient.       Patient Instructions   Everything looks fine!    Refills of medications have been faxed to your pharmacy.     I'll get back to you with lab results soon, especially if there is anything of concern.      See you in a year (unless labs return in diabetes range), sooner if problems.        Counseling Resources:  ATP IV Guidelines  Pooled Cohorts Equation Calculator  Breast Cancer Risk Calculator  FRAX Risk Assessment  ICSI Preventive Guidelines  Dietary Guidelines for Americans, 2010  USDA's MyPlate  ASA " Prophylaxis  Lung CA Screening    Roverto Edwards MD,   [facility]    This document serves as a record of the services and decisions personally performed and made by Roverto Edwards MD. It was created on their behalf by Adi Bonilla, a trained medical scribe. The creation of this document is based the provider's statements to the medical scribe.  Adi Bonilla May 26, 2017 11:00 AM

## 2017-06-12 ENCOUNTER — HOSPITAL ENCOUNTER (OUTPATIENT)
Facility: CLINIC | Age: 72
Discharge: HOME | End: 2017-06-12
Attending: OPHTHALMOLOGY | Admitting: OPHTHALMOLOGY
Payer: COMMERCIAL

## 2017-06-12 ENCOUNTER — SURGERY (OUTPATIENT)
Age: 72
End: 2017-06-12

## 2017-06-12 ENCOUNTER — ANESTHESIA EVENT (OUTPATIENT)
Dept: SURGERY | Facility: CLINIC | Age: 72
End: 2017-06-12
Payer: COMMERCIAL

## 2017-06-12 ENCOUNTER — ANESTHESIA (OUTPATIENT)
Dept: SURGERY | Facility: CLINIC | Age: 72
End: 2017-06-12
Payer: COMMERCIAL

## 2017-06-12 VITALS
SYSTOLIC BLOOD PRESSURE: 112 MMHG | RESPIRATION RATE: 14 BRPM | OXYGEN SATURATION: 96 % | DIASTOLIC BLOOD PRESSURE: 67 MMHG

## 2017-06-12 DIAGNOSIS — H26.9 CATARACT: Primary | ICD-10-CM

## 2017-06-12 PROCEDURE — V2632 POST CHMBR INTRAOCULAR LENS: HCPCS | Performed by: OPHTHALMOLOGY

## 2017-06-12 PROCEDURE — 71000028 ZZH EYE RECOVERY PHASE 2 EACH 15 MINS: Performed by: OPHTHALMOLOGY

## 2017-06-12 PROCEDURE — 25000128 H RX IP 250 OP 636: Performed by: ANESTHESIOLOGY

## 2017-06-12 PROCEDURE — 25000125 ZZHC RX 250: Performed by: ANESTHESIOLOGY

## 2017-06-12 PROCEDURE — 27210794 ZZH OR GENERAL SUPPLY STERILE: Performed by: OPHTHALMOLOGY

## 2017-06-12 PROCEDURE — 25000125 ZZHC RX 250: Performed by: OPHTHALMOLOGY

## 2017-06-12 PROCEDURE — 40000170 ZZH STATISTIC PRE-PROCEDURE ASSESSMENT II: Performed by: OPHTHALMOLOGY

## 2017-06-12 PROCEDURE — 36000101 ZZH EYE SURGERY LEVEL 3 1ST 30 MIN: Performed by: OPHTHALMOLOGY

## 2017-06-12 PROCEDURE — 25000125 ZZHC RX 250: Performed by: NURSE ANESTHETIST, CERTIFIED REGISTERED

## 2017-06-12 PROCEDURE — 37000008 ZZH ANESTHESIA TECHNICAL FEE, 1ST 30 MIN: Performed by: OPHTHALMOLOGY

## 2017-06-12 PROCEDURE — 25000128 H RX IP 250 OP 636: Performed by: NURSE ANESTHETIST, CERTIFIED REGISTERED

## 2017-06-12 PROCEDURE — 25000128 H RX IP 250 OP 636: Performed by: OPHTHALMOLOGY

## 2017-06-12 DEVICE — EYE IMP IOL AMO PCL TECNIS ZCB00 19.0: Type: IMPLANTABLE DEVICE | Site: EYE | Status: FUNCTIONAL

## 2017-06-12 RX ORDER — LIDOCAINE HYDROCHLORIDE 10 MG/ML
INJECTION, SOLUTION EPIDURAL; INFILTRATION; INTRACAUDAL; PERINEURAL PRN
Status: DISCONTINUED | OUTPATIENT
Start: 2017-06-12 | End: 2017-06-12 | Stop reason: HOSPADM

## 2017-06-12 RX ORDER — PROPARACAINE HYDROCHLORIDE 5 MG/ML
1 SOLUTION/ DROPS OPHTHALMIC ONCE
Status: DISCONTINUED | OUTPATIENT
Start: 2017-06-12 | End: 2017-06-12 | Stop reason: HOSPADM

## 2017-06-12 RX ORDER — SODIUM CHLORIDE, SODIUM LACTATE, POTASSIUM CHLORIDE, CALCIUM CHLORIDE 600; 310; 30; 20 MG/100ML; MG/100ML; MG/100ML; MG/100ML
500 INJECTION, SOLUTION INTRAVENOUS CONTINUOUS
Status: DISCONTINUED | OUTPATIENT
Start: 2017-06-12 | End: 2017-06-12 | Stop reason: HOSPADM

## 2017-06-12 RX ORDER — BALANCED SALT SOLUTION 6.4; .75; .48; .3; 3.9; 1.7 MG/ML; MG/ML; MG/ML; MG/ML; MG/ML; MG/ML
SOLUTION OPHTHALMIC PRN
Status: DISCONTINUED | OUTPATIENT
Start: 2017-06-12 | End: 2017-06-12 | Stop reason: HOSPADM

## 2017-06-12 RX ORDER — TROPICAMIDE 10 MG/ML
1 SOLUTION/ DROPS OPHTHALMIC
Status: COMPLETED | OUTPATIENT
Start: 2017-06-12 | End: 2017-06-12

## 2017-06-12 RX ORDER — PROPARACAINE HYDROCHLORIDE 5 MG/ML
1 SOLUTION/ DROPS OPHTHALMIC ONCE
Status: COMPLETED | OUTPATIENT
Start: 2017-06-12 | End: 2017-06-12

## 2017-06-12 RX ORDER — PREDNISOLONE ACETATE 1 %
SUSPENSION, DROPS(FINAL DOSAGE FORM)(ML) OPHTHALMIC (EYE) PRN
Status: DISCONTINUED | OUTPATIENT
Start: 2017-06-12 | End: 2017-06-12 | Stop reason: HOSPADM

## 2017-06-12 RX ORDER — PHENYLEPHRINE HYDROCHLORIDE 25 MG/ML
1 SOLUTION/ DROPS OPHTHALMIC
Status: COMPLETED | OUTPATIENT
Start: 2017-06-12 | End: 2017-06-12

## 2017-06-12 RX ORDER — CYCLOPENTOLATE HYDROCHLORIDE 10 MG/ML
1 SOLUTION/ DROPS OPHTHALMIC
Status: COMPLETED | OUTPATIENT
Start: 2017-06-12 | End: 2017-06-12

## 2017-06-12 RX ORDER — ONDANSETRON 2 MG/ML
INJECTION INTRAMUSCULAR; INTRAVENOUS PRN
Status: DISCONTINUED | OUTPATIENT
Start: 2017-06-12 | End: 2017-06-12

## 2017-06-12 RX ADMIN — PHENYLEPHRINE HYDROCHLORIDE 1 DROP: 2.5 SOLUTION/ DROPS OPHTHALMIC at 08:26

## 2017-06-12 RX ADMIN — PROPARACAINE HYDROCHLORIDE 1 DROP: 5 SOLUTION/ DROPS OPHTHALMIC at 08:26

## 2017-06-12 RX ADMIN — TROPICAMIDE 1 DROP: 10 SOLUTION/ DROPS OPHTHALMIC at 08:26

## 2017-06-12 RX ADMIN — LIDOCAINE HYDROCHLORIDE 0.5 ML: 35 GEL OPHTHALMIC at 09:00

## 2017-06-12 RX ADMIN — CYCLOPENTOLATE HYDROCHLORIDE 1 DROP: 10 SOLUTION/ DROPS OPHTHALMIC at 08:32

## 2017-06-12 RX ADMIN — CYCLOPENTOLATE HYDROCHLORIDE 1 DROP: 10 SOLUTION/ DROPS OPHTHALMIC at 08:39

## 2017-06-12 RX ADMIN — CYCLOPENTOLATE HYDROCHLORIDE 1 DROP: 10 SOLUTION/ DROPS OPHTHALMIC at 08:26

## 2017-06-12 RX ADMIN — TROPICAMIDE 1 DROP: 10 SOLUTION/ DROPS OPHTHALMIC at 08:39

## 2017-06-12 RX ADMIN — MIDAZOLAM HYDROCHLORIDE 2 MG: 1 INJECTION, SOLUTION INTRAMUSCULAR; INTRAVENOUS at 09:06

## 2017-06-12 RX ADMIN — LIDOCAINE HYDROCHLORIDE 1 ML: 10 INJECTION, SOLUTION EPIDURAL; INFILTRATION; INTRACAUDAL; PERINEURAL at 09:13

## 2017-06-12 RX ADMIN — LIDOCAINE HYDROCHLORIDE 1 ML: 10 INJECTION, SOLUTION EPIDURAL; INFILTRATION; INTRACAUDAL; PERINEURAL at 08:40

## 2017-06-12 RX ADMIN — BALANCED SALT SOLUTION 15 ML: 6.4; .75; .48; .3; 3.9; 1.7 SOLUTION OPHTHALMIC at 09:12

## 2017-06-12 RX ADMIN — DEXMEDETOMIDINE HYDROCHLORIDE 8 MCG: 100 INJECTION, SOLUTION INTRAVENOUS at 09:10

## 2017-06-12 RX ADMIN — PHENYLEPHRINE HYDROCHLORIDE 1 DROP: 2.5 SOLUTION/ DROPS OPHTHALMIC at 08:32

## 2017-06-12 RX ADMIN — SODIUM CHLORIDE, POTASSIUM CHLORIDE, SODIUM LACTATE AND CALCIUM CHLORIDE 500 ML: 600; 310; 30; 20 INJECTION, SOLUTION INTRAVENOUS at 08:40

## 2017-06-12 RX ADMIN — Medication 1 DROP: at 09:21

## 2017-06-12 RX ADMIN — EPINEPHRINE 500 ML: 1 INJECTION, SOLUTION, CONCENTRATE INTRAVENOUS at 09:12

## 2017-06-12 RX ADMIN — ONDANSETRON 4 MG: 2 INJECTION INTRAMUSCULAR; INTRAVENOUS at 09:06

## 2017-06-12 RX ADMIN — SODIUM CHONDROITIN SULFATE / SODIUM HYALURONATE 1 ML: 0.55-0.5 INJECTION INTRAOCULAR at 09:13

## 2017-06-12 RX ADMIN — TROPICAMIDE 1 DROP: 10 SOLUTION/ DROPS OPHTHALMIC at 08:32

## 2017-06-12 RX ADMIN — PHENYLEPHRINE HYDROCHLORIDE 1 DROP: 2.5 SOLUTION/ DROPS OPHTHALMIC at 08:39

## 2017-06-12 ASSESSMENT — ENCOUNTER SYMPTOMS
SEIZURES: 0
DYSRHYTHMIAS: 0
ORTHOPNEA: 0

## 2017-06-12 ASSESSMENT — COPD QUESTIONNAIRES: COPD: 0

## 2017-06-12 NOTE — IP AVS SNAPSHOT
[facility]    [address]    [place] [address]    Phone:  [phone]    Fax:  [phone]                                       After Visit Summary   6/12/2017    Connor Srinivasan    MRN: [id number]           After Visit Summary Signature Page     I have received my discharge instructions, and my questions have been answered. I have discussed any challenges I see with this plan with the nurse or doctor.    ..........................................................................................................................................  Patient/Patient Representative Signature      ..........................................................................................................................................  Patient Representative Print Name and Relationship to Patient    ..................................................               ................................................  Date                                            Time    ..........................................................................................................................................  Reviewed by Signature/Title    ...................................................              ..............................................  Date                                                            Time

## 2017-06-12 NOTE — IP AVS SNAPSHOT
MRN:[id number]                      After Visit Summary   6/12/2017    Connor Srinivasan    MRN: [id number]           Thank you!     Thank you for choosing [facility] for your care. Our goal is always to provide you with excellent care. Hearing back from our patients is one way we can continue to improve our services. Please take a few minutes to complete the written survey that you may receive in the mail after you visit with us. Thank you!        Patient Information     Date Of Birth          1945        About your hospital stay     You were admitted on:  June 12, 2017 You last received care in the:  [facility]    You were discharged on:  June 12, 2017       Who to Call     For medical emergencies, please call 911.  For non-urgent questions about your medical care, please call your primary care provider or clinic, [phone]  For questions related to your surgery, please call your surgery clinic        Attending Provider     Provider Specialty    John Sanchez MD Ophthalmology       Primary Care Provider Office Phone # Fax #    Roverto Edwards [phone]      Your next 10 appointments already scheduled     Jun 28, 2017   Procedure with John Sanchez MD   [facility] (--)    [address] [place] [address] [address], [address]  [facility] [address] [phone]              Further instructions from your care team       Same Day Surgery Discharge Instructions for  Sedation and General Anesthesia       It's not unusual to feel dizzy, light-headed or faint for up to 24 hours after surgery or while taking pain medication.  If you have these symptoms: sit for a few minutes before standing and have someone assist you when you get up to walk or use the bathroom.      You should rest and relax for the next 24 hours. We recommend you make arrangements to have an adult stay with you for at least 24 hours after your discharge.  Avoid hazardous  and strenuous activity.      DO NOT DRIVE any vehicle or operate mechanical equipment for 24 hours following the end of your surgery.  Even though you may feel normal, your reactions may be affected by the medication you have received.      Do not drink alcoholic beverages for 24 hours following surgery.       Slowly progress to your regular diet as you feel able. It's not unusual to feel nauseated and/or vomit after receiving anesthesia.  If you develop these symptoms, drink clear liquids (apple juice, ginger ale, broth, 7-up, etc. ) until you feel better.  If your nausea and vomiting persists for 24 hours, please notify your surgeon.        All narcotic pain medications, along with inactivity and anesthesia, can cause constipation. Drinking plenty of liquids and increasing fiber intake will help.      For any questions of a medical nature, call your surgeon.      Do not make important decisions for 24 hours.      If you had general anesthesia, you may have a sore throat for a couple of days related to the breathing tube used during surgery.  You may use Cepacol lozenges to help with this discomfort.  If it worsens or if you develop a fever, contact your surgeon.       If you feel your pain is not well managed with the pain medications prescribed by your surgeon, please contact your surgeon's office to let them know so they can address your concerns.           [facility]  Cataract Surgery Discharge Instructions  [facility] MD COLEEN Langley MD J. Hasan, MD C. Nichols, MD J. O'Neill, MD S. Schaefer, MD J. Stephens, MD        Start using drops when you arrive at home today    Vigamox or Ofloxacin (Tan top) - one drop in surgical eye 3 times  per day until gone.    Prednisolone acetate 1.0% (pink or white top) - one drop in surgical eye 3 times per day until gone.    Ketorolac or Diclofenac (Grey top) - one drop in surgical  eye 3 times per day until gone.       OR     You may have been prescribed SmartDrops, which is a compound formula drop that combines all three medications in a single drop. SmartDrops should be instilled to the surgical eye 3 times daily until gone.        Place shield over surgical eye at bedtime for 3 nights.      The eye will feel itchy, scratchy, and vision will be blurred, you may take Tylenol for the scratchy feeling if this is bothersome.      No eye rubbing or swimming for I week.      You may resume all prescription medications as directed by your primary doctor.      Call if increasing pain, progressively worsening vision or worsening redness of surgical eye.      On-call doctor can be reached at [phone].          Pending Results     No orders found from 6/10/2017 to 6/13/2017.            Admission Information     Date & Time Provider Department Dept. Phone    6/12/2017 John Sanchez MD [facility] [phone]      Your Vitals Were     Blood Pressure Respirations Pulse Oximetry             112/67 14 96%         [facility] Information     [facility] gives you secure access to your electronic health record. If you see a primary care provider, you can also send messages to your care team and make appointments. If you have questions, please call your primary care clinic.  If you do not have a primary care provider, please call [phone] and they will assist you.        Care EveryWhere ID     This is your Care EveryWhere ID. This could be used by other organizations to access your [facility] medical records  [id number]           Review of your medicines      CONTINUE these medicines which have NOT CHANGED        Dose / Directions    aspirin 81 MG tablet   Used for:  Routine general medical examination at a health care facility, Other and unspecified hyperlipidemia        ONE DAILY   Quantity:  100   Refills:  3       atenolol 50 MG tablet   Commonly known as:  TENORMIN   Used for:   Benign essential hypertension        Dose:  50 mg   Take 1 tablet (50 mg) by mouth daily   Quantity:  90 tablet   Refills:  3       fluticasone 50 MCG/ACT spray   Commonly known as:  FLONASE   Used for:  Chronic rhinitis        Dose:  2 spray   Spray 2 sprays into both nostrils daily   Quantity:  48 g   Refills:  0       hydrochlorothiazide 25 MG tablet   Commonly known as:  HYDRODIURIL   Used for:  Benign essential hypertension        Dose:  25 mg   Take 1 tablet (25 mg) by mouth daily   Quantity:  90 tablet   Refills:  3       simvastatin 40 MG tablet   Commonly known as:  ZOCOR   Used for:  Hyperlipidemia LDL goal <130        Dose:  40 mg   Take 1 tablet (40 mg) by mouth At Bedtime   Quantity:  90 tablet   Refills:  3       tadalafil 20 MG tablet   Commonly known as:  CIALIS   Used for:  Impotence of organic origin        Take one tablet by mouth. TAKE AT LEAST 30 MINUTES BEFORE INTERCOURSE,   Quantity:  16 tablet   Refills:  3                Protect others around you: Learn how to safely use, store and throw away your medicines at www.disposemymeds.org.             Medication List: This is a list of all your medications and when to take them. Check marks below indicate your daily home schedule. Keep this list as a reference.      Medications           Morning Afternoon Evening Bedtime As Needed    aspirin 81 MG tablet   ONE DAILY                                atenolol 50 MG tablet   Commonly known as:  TENORMIN   Take 1 tablet (50 mg) by mouth daily                                fluticasone 50 MCG/ACT spray   Commonly known as:  FLONASE   Spray 2 sprays into both nostrils daily                                hydrochlorothiazide 25 MG tablet   Commonly known as:  HYDRODIURIL   Take 1 tablet (25 mg) by mouth daily                                simvastatin 40 MG tablet   Commonly known as:  ZOCOR   Take 1 tablet (40 mg) by mouth At Bedtime                                tadalafil 20 MG tablet   Commonly known  as:  CIALIS   Take one tablet by mouth. TAKE AT LEAST 30 MINUTES BEFORE INTERCOURSE,

## 2017-06-12 NOTE — ANESTHESIA PREPROCEDURE EVALUATION
Procedure: Procedure(s):  PHACOEMULSIFICATION CLEAR CORNEA WITH STANDARD INTRAOCULAR LENS IMPLANT  Preop diagnosis: CATARACT     Allergies   Allergen Reactions     No Known Drug Allergies      Past Medical History:   Diagnosis Date     Chronic rhinitis      Localized osteoarthrosis not specified whether primary or secondary, lower leg      Onychomycoses      Other and unspecified hyperlipidemia      Unspecified essential hypertension      Past Surgical History:   Procedure Laterality Date     C TOTAL KNEE ARTHROPLASTY  1/2004    Knee Replacement, Total (Right)     C TOTAL KNEE ARTHROPLASTY  10/2003    Knee Replacement, Total (Left)     HC COLONOSCOPY THRU STOMA, DIAGNOSTIC  4/3/2006    Normal     HC EXCISION PILONIDAL LESION SIMPLE  1966     Prior to Admission medications    Medication Sig Start Date End Date Taking? Authorizing Provider   tadalafil (CIALIS) 20 MG tablet Take one tablet by mouth. TAKE AT LEAST 30 MINUTES BEFORE INTERCOURSE, 5/26/17  Yes Roverto Edwards MD   hydrochlorothiazide (HYDRODIURIL) 25 MG tablet Take 1 tablet (25 mg) by mouth daily 5/26/17  Yes Roverto Edwards MD   atenolol (TENORMIN) 50 MG tablet Take 1 tablet (50 mg) by mouth daily 5/26/17  Yes Roverto Edwards MD   simvastatin (ZOCOR) 40 MG tablet Take 1 tablet (40 mg) by mouth At Bedtime 5/26/17  Yes Roverto Edwards MD   fluticasone (FLONASE) 50 MCG/ACT spray Spray 2 sprays into both nostrils daily 4/26/17  Yes Roverto Edwards MD   ASPIRIN 81 MG OR TABS ONE DAILY 4/23/08  Yes Roverto Edwards MD     Current Facility-Administered Medications Ordered in Epic   Medication Dose Route Frequency Last Rate Last Dose     proparacaine (ALCAINE) 0.5 % ophthalmic solution 1 drop  1 drop Ophthalmic Once         lidocaine (AKTEN) ophthalmic gel 0.5 mL  0.5 mL Ophthalmic Once         povidone-iodine 5 % ophthalmic solution 1 drop  1 drop Ophthalmic Once         lidocaine 1 % 1 mL  1 mL Other Q1H PRN   1 mL at 06/12/17 0840     lactated ringers  infusion  500 mL Intravenous Continuous 25 mL/hr at 06/12/17 0840 500 mL at 06/12/17 0840     No Pre Procedure Antibiotic Needed    PRN   1 each at 06/12/17 0841     No current Epic-ordered outpatient prescriptions on file.     Wt Readings from Last 1 Encounters:   06/07/17 110.1 kg (242 lb 12.8 oz)     Temp Readings from Last 1 Encounters:   06/07/17 36.7  C (98.1  F) (Oral)     BP Readings from Last 6 Encounters:   06/07/17 114/70   05/26/17 134/70   04/21/16 131/82   03/19/15 136/66   03/26/14 122/68   03/22/13 122/74     Pulse Readings from Last 4 Encounters:   06/07/17 75   05/26/17 67   04/21/16 80   03/19/15 71     Resp Readings from Last 1 Encounters:   06/07/17 16     SpO2 Readings from Last 1 Encounters:   06/07/17 95%     Recent Labs   Lab Test  05/26/17   1003  04/21/16   1115   NA  142  141   POTASSIUM  4.0  4.5   CHLORIDE  108  107   CO2  24  28   ANIONGAP  10  6   GLC  133*  128*   BUN  20  20   CR  0.73  0.82   BARON  9.0  8.9     Recent Labs   Lab Test  05/26/17   1003  04/21/16   1115   WBC  3.1*  3.3*   HGB  15.8  16.2   PLT  102*  112*     RECENT LABS:   ECG:   ECHO:   CXR:      Anesthesia Evaluation     . Pt has had prior anesthetic.            ROS/MED HX    ENT/Pulmonary:     (+)allergic rhinitis, , . .   (-) asthma, COPD, sleep apnea and recent URI   Neurologic:      (-) seizures and CVA   Cardiovascular:     (+) Dyslipidemia, hypertension----. : . . . :. .      (-) angina, CAD, orthopnea/PND, syncope, arrhythmias, irregular heartbeat/palpitations, valvular problems/murmurs and angina   METS/Exercise Tolerance:  >4 METS   Hematologic:         Musculoskeletal:         GI/Hepatic:        (-) GERD and liver disease   Renal/Genitourinary:      (-) renal disease   Endo:      (-) Type II DM and thyroid disease   Psychiatric:         Infectious Disease:         Malignancy:         Other:                     Physical Exam  Normal systems: dental    Airway   Mallampati: II  TM distance: >3 FB  Neck ROM:  full    Dental     Cardiovascular   Rhythm and rate: regular and normal  (-) no murmur    Pulmonary    breath sounds clear to auscultation(-) no wheezes                    Anesthesia Plan      History & Physical Review  History and physical reviewed and following examination; no interval change.    ASA Status:  2 .    NPO Status:  > 8 hours    Plan for MAC   PONV prophylaxis:  Ondansetron (or other 5HT-3)       Postoperative Care  Postoperative pain management:  Multi-modal analgesia.      Consents  Anesthetic plan, risks, benefits and alternatives discussed with:  Patient..

## 2017-06-12 NOTE — DISCHARGE INSTRUCTIONS
Same Day Surgery Discharge Instructions for  Sedation and General Anesthesia       It's not unusual to feel dizzy, light-headed or faint for up to 24 hours after surgery or while taking pain medication.  If you have these symptoms: sit for a few minutes before standing and have someone assist you when you get up to walk or use the bathroom.      You should rest and relax for the next 24 hours. We recommend you make arrangements to have an adult stay with you for at least 24 hours after your discharge.  Avoid hazardous and strenuous activity.      DO NOT DRIVE any vehicle or operate mechanical equipment for 24 hours following the end of your surgery.  Even though you may feel normal, your reactions may be affected by the medication you have received.      Do not drink alcoholic beverages for 24 hours following surgery.       Slowly progress to your regular diet as you feel able. It's not unusual to feel nauseated and/or vomit after receiving anesthesia.  If you develop these symptoms, drink clear liquids (apple juice, ginger ale, broth, 7-up, etc. ) until you feel better.  If your nausea and vomiting persists for 24 hours, please notify your surgeon.        All narcotic pain medications, along with inactivity and anesthesia, can cause constipation. Drinking plenty of liquids and increasing fiber intake will help.      For any questions of a medical nature, call your surgeon.      Do not make important decisions for 24 hours.      If you had general anesthesia, you may have a sore throat for a couple of days related to the breathing tube used during surgery.  You may use Cepacol lozenges to help with this discomfort.  If it worsens or if you develop a fever, contact your surgeon.       If you feel your pain is not well managed with the pain medications prescribed by your surgeon, please contact your surgeon's office to let them know so they can address your concerns.           [facility]  Cataract  Surgery Discharge Instructions  [facility] MD COLEEN Langley MD J. Hasan, MD C. Nichols, MD J. O'Neill, MD S. Schaefer, MD J. Stephens, MD        Start using drops when you arrive at home today    Vigamox or Ofloxacin (Tan top) - one drop in surgical eye 3 times  per day until gone.    Prednisolone acetate 1.0% (pink or white top) - one drop in surgical eye 3 times per day until gone.    Ketorolac or Diclofenac (Grey top) - one drop in surgical eye 3 times per day until gone.       OR     You may have been prescribed SmartDrops, which is a compound formula drop that combines all three medications in a single drop. SmartDrops should be instilled to the surgical eye 3 times daily until gone.        Place shield over surgical eye at bedtime for 3 nights.      The eye will feel itchy, scratchy, and vision will be blurred, you may take Tylenol for the scratchy feeling if this is bothersome.      No eye rubbing or swimming for I week.      You may resume all prescription medications as directed by your primary doctor.      Call if increasing pain, progressively worsening vision or worsening redness of surgical eye.      On-call doctor can be reached at [phone].

## 2017-06-12 NOTE — OP NOTE
PREOPERATIVE DIAGNOSIS: Visually significant cataract, Right eye   POSTOPERATIVE DIAGNOSIS: Same   PROCEDURES:   1. Cataract extraction with intraocular lens implant Right eye.  SURGEON: John Sanchez M.D.  INDICATIONS: The patient Connor Srinivasan presented to the eye clinic with decreased vision secondary to cataract in the Right eye. The risks, including, but not limited to infection, loss of vision, loss of eye, need for more surgery, and bleeding, along with the benefits, alternatives, expectations, and the procedure itself were discussed at length with the patient who wished to proceed with surgery. All questions were answered to the patient's satisfaction.  DESCRIPTION OF PROCEDURE:   Prior to the procedure, appropriate cardiac and respiratory monitors were applied to the patient.  In the pre-operative holding area, a drop of topical tetracaine followed by povidone iodine followed by lidocaine gel.  The patient was brought to the operating room where a surgical pause was carried out to identify with all members of the surgical team the correct surgical site.  With adequate anesthesia, the Right eye was prepped and draped in the usual sterile fashion. A lid speculum was placed, and the operating microscope was rotated into position. A paracentesis was created.  Through this limbal paracentesis, the anterior chamber was filled with preservative-free lidocaine followed by viscoelastic.   A temporal clear corneal incision was created at the limbus using a 2.6 mm blade. A capsulorrhexis was initiated using a cystotome and was completed in continuous and circular fashion using the capsulorrhexis forceps. The lens nucleus was hydrodissected using balanced salt solution.  The lens nucleus was rotated and removed using phacoemulsification in a stop and chop technique.  Residual cortical material was removed using irrigation-aspiration.  The capsular bag was reinflated to its maximal extent with cohesive  viscoelastic.  A 19.0 diopter ZCBOO was inserted into the capsular bag and noted to be well centered.  The lens power selected was reviewed using the intraocular lens power measurements that were obtained preoperatively to confirm that the correct lens was selected for the desired post-operative refractive state. The residual viscoelastic was aspirated. The anterior chamber was inflated with balanced salt solution and the wounds were hydrated and found to be self-sealing.  The eye was palpated and found to be of normal physiologic pressure. The lid speculum was removed. One drop of antibiotic and prednisolone were instilled in the eye and a clear shield placed over the eye. The patient tolerated the procedure well and there were no intraoperative complications.      PLAN: The patient will be discharged to home and will follow up tomorrow in the eye clinic.  EBL:  None  Complications:  None    Implant Name Type Inv. Item Serial No.  Lot No. LRB No. Used   EYE IMP IOL FIOR PCL TECNIS [id number] 19.0 Lens/Eye Implant EYE IMP IOL FIOR PCL TECNIS ZCB00 19.0 [id number] [employer]   Right 1

## 2017-06-12 NOTE — ANESTHESIA CARE TRANSFER NOTE
Patient: Connor Sriniavsan    Procedure(s):  RIGHT PHACOEMULSIFICATION CLEAR CORNEA WITH STANDARD INTRAOCULAR LENS IMPLANT  - Wound Class: I-Clean    Diagnosis: CATARACT   Diagnosis Additional Information: No value filed.    Anesthesia Type:   MAC     Note:  Airway :Room Air  Patient transferred to:PACU  Comments: [facility] to [facility] room in recliner with armrests up, spontaneous respirations, O2 saturation 97% on room air. All monitors and alarms on and functioning, clinically stable vital signs. Report given to recovery RN and questions answered. Patient alert and following verbal directions.      Vitals: (Last set prior to Anesthesia Care Transfer)    CRNA VITALS  6/12/2017 0856 - 6/12/2017 0928      6/12/2017             SpO2: 98 %    Resp Rate (set): 10                Electronically Signed By: JOSE Brown CRNA  June 12, 2017  9:28 AM

## 2017-06-13 NOTE — ANESTHESIA POSTPROCEDURE EVALUATION
Patient: Connor Srinivasan    Procedure(s):  RIGHT PHACOEMULSIFICATION CLEAR CORNEA WITH STANDARD INTRAOCULAR LENS IMPLANT  - Wound Class: I-Clean    Diagnosis:CATARACT   Diagnosis Additional Information: No value filed.    Anesthesia Type:  MAC    Note:  Anesthesia Post Evaluation    Patient location during evaluation: PACU  Patient participation: Able to fully participate in evaluation  Level of consciousness: awake  Pain management: adequate  Airway patency: patent  Cardiovascular status: acceptable  Respiratory status: acceptable  Hydration status: acceptable  PONV: none     Anesthetic complications: None          Last vitals:  Vitals:    06/12/17 0927   BP: 112/67   Resp: 14   SpO2: 96%         Electronically Signed By: Saul Denis MD  June 13, 2017  7:07 AM

## 2017-06-15 NOTE — DOWNTIME EVENT NOTE
The EMR was down for 6 hours on 6/12/2017.     All staff was responsible for completing the paper charting during this time period.      The following information was re-entered into the system by Wendie Govea: Downtime note     The following information will remain in the paper chart: All downtime paper forms     Wendie Govea  6/15/2017

## 2017-06-28 ENCOUNTER — SURGERY (OUTPATIENT)
Age: 72
End: 2017-06-28

## 2017-06-28 ENCOUNTER — ANESTHESIA EVENT (OUTPATIENT)
Dept: SURGERY | Facility: CLINIC | Age: 72
End: 2017-06-28
Payer: COMMERCIAL

## 2017-06-28 ENCOUNTER — HOSPITAL ENCOUNTER (OUTPATIENT)
Facility: CLINIC | Age: 72
Discharge: HOME | End: 2017-06-28
Attending: OPHTHALMOLOGY | Admitting: OPHTHALMOLOGY
Payer: COMMERCIAL

## 2017-06-28 ENCOUNTER — ANESTHESIA (OUTPATIENT)
Dept: SURGERY | Facility: CLINIC | Age: 72
End: 2017-06-28
Payer: COMMERCIAL

## 2017-06-28 VITALS
DIASTOLIC BLOOD PRESSURE: 69 MMHG | TEMPERATURE: 97.5 F | BODY MASS INDEX: 35.96 KG/M2 | HEIGHT: 69 IN | SYSTOLIC BLOOD PRESSURE: 125 MMHG | OXYGEN SATURATION: 1 % | WEIGHT: 242.8 LBS | HEART RATE: 57 BPM | RESPIRATION RATE: 16 BRPM

## 2017-06-28 DIAGNOSIS — H25.10 SENILE NUCLEAR SCLEROSIS, UNSPECIFIED LATERALITY: Primary | ICD-10-CM

## 2017-06-28 PROCEDURE — 71000028 ZZH EYE RECOVERY PHASE 2 EACH 15 MINS: Performed by: OPHTHALMOLOGY

## 2017-06-28 PROCEDURE — 40000170 ZZH STATISTIC PRE-PROCEDURE ASSESSMENT II: Performed by: OPHTHALMOLOGY

## 2017-06-28 PROCEDURE — 25000128 H RX IP 250 OP 636: Performed by: OPHTHALMOLOGY

## 2017-06-28 PROCEDURE — 25000125 ZZHC RX 250: Performed by: ANESTHESIOLOGY

## 2017-06-28 PROCEDURE — 25000128 H RX IP 250 OP 636: Performed by: ANESTHESIOLOGY

## 2017-06-28 PROCEDURE — 37000008 ZZH ANESTHESIA TECHNICAL FEE, 1ST 30 MIN: Performed by: OPHTHALMOLOGY

## 2017-06-28 PROCEDURE — 36000101 ZZH EYE SURGERY LEVEL 3 1ST 30 MIN: Performed by: OPHTHALMOLOGY

## 2017-06-28 PROCEDURE — 25000128 H RX IP 250 OP 636: Performed by: NURSE ANESTHETIST, CERTIFIED REGISTERED

## 2017-06-28 PROCEDURE — V2632 POST CHMBR INTRAOCULAR LENS: HCPCS | Performed by: OPHTHALMOLOGY

## 2017-06-28 PROCEDURE — 25000125 ZZHC RX 250: Performed by: OPHTHALMOLOGY

## 2017-06-28 PROCEDURE — 27210794 ZZH OR GENERAL SUPPLY STERILE: Performed by: OPHTHALMOLOGY

## 2017-06-28 DEVICE — EYE IMP IOL AMO PCL TECNIS ZCB00 19.5: Type: IMPLANTABLE DEVICE | Site: EYE | Status: FUNCTIONAL

## 2017-06-28 RX ORDER — SODIUM CHLORIDE, SODIUM LACTATE, POTASSIUM CHLORIDE, CALCIUM CHLORIDE 600; 310; 30; 20 MG/100ML; MG/100ML; MG/100ML; MG/100ML
500 INJECTION, SOLUTION INTRAVENOUS CONTINUOUS
Status: DISCONTINUED | OUTPATIENT
Start: 2017-06-28 | End: 2017-06-28 | Stop reason: HOSPADM

## 2017-06-28 RX ORDER — BALANCED SALT SOLUTION 6.4; .75; .48; .3; 3.9; 1.7 MG/ML; MG/ML; MG/ML; MG/ML; MG/ML; MG/ML
SOLUTION OPHTHALMIC PRN
Status: DISCONTINUED | OUTPATIENT
Start: 2017-06-28 | End: 2017-06-28 | Stop reason: HOSPADM

## 2017-06-28 RX ORDER — PROPARACAINE HYDROCHLORIDE 5 MG/ML
1 SOLUTION/ DROPS OPHTHALMIC ONCE
Status: DISCONTINUED | OUTPATIENT
Start: 2017-06-28 | End: 2017-06-28 | Stop reason: HOSPADM

## 2017-06-28 RX ORDER — ONDANSETRON 2 MG/ML
4 INJECTION INTRAMUSCULAR; INTRAVENOUS EVERY 30 MIN PRN
Status: DISCONTINUED | OUTPATIENT
Start: 2017-06-28 | End: 2017-06-28 | Stop reason: HOSPADM

## 2017-06-28 RX ORDER — PHENYLEPHRINE HYDROCHLORIDE 25 MG/ML
1 SOLUTION/ DROPS OPHTHALMIC
Status: COMPLETED | OUTPATIENT
Start: 2017-06-28 | End: 2017-06-28

## 2017-06-28 RX ORDER — PROPARACAINE HYDROCHLORIDE 5 MG/ML
1 SOLUTION/ DROPS OPHTHALMIC ONCE
Status: COMPLETED | OUTPATIENT
Start: 2017-06-28 | End: 2017-06-28

## 2017-06-28 RX ORDER — TROPICAMIDE 10 MG/ML
1 SOLUTION/ DROPS OPHTHALMIC
Status: COMPLETED | OUTPATIENT
Start: 2017-06-28 | End: 2017-06-28

## 2017-06-28 RX ORDER — CYCLOPENTOLATE HYDROCHLORIDE 10 MG/ML
1 SOLUTION/ DROPS OPHTHALMIC
Status: COMPLETED | OUTPATIENT
Start: 2017-06-28 | End: 2017-06-28

## 2017-06-28 RX ORDER — FENTANYL CITRATE 50 UG/ML
25-50 INJECTION, SOLUTION INTRAMUSCULAR; INTRAVENOUS
Status: DISCONTINUED | OUTPATIENT
Start: 2017-06-28 | End: 2017-06-28 | Stop reason: HOSPADM

## 2017-06-28 RX ORDER — LIDOCAINE HYDROCHLORIDE 10 MG/ML
INJECTION, SOLUTION EPIDURAL; INFILTRATION; INTRACAUDAL; PERINEURAL PRN
Status: DISCONTINUED | OUTPATIENT
Start: 2017-06-28 | End: 2017-06-28 | Stop reason: HOSPADM

## 2017-06-28 RX ORDER — MEPERIDINE HYDROCHLORIDE 25 MG/ML
12.5 INJECTION INTRAMUSCULAR; INTRAVENOUS; SUBCUTANEOUS
Status: DISCONTINUED | OUTPATIENT
Start: 2017-06-28 | End: 2017-06-28 | Stop reason: HOSPADM

## 2017-06-28 RX ORDER — SODIUM CHLORIDE, SODIUM LACTATE, POTASSIUM CHLORIDE, CALCIUM CHLORIDE 600; 310; 30; 20 MG/100ML; MG/100ML; MG/100ML; MG/100ML
INJECTION, SOLUTION INTRAVENOUS CONTINUOUS
Status: DISCONTINUED | OUTPATIENT
Start: 2017-06-28 | End: 2017-06-28 | Stop reason: HOSPADM

## 2017-06-28 RX ORDER — PREDNISOLONE ACETATE 1 %
SUSPENSION, DROPS(FINAL DOSAGE FORM)(ML) OPHTHALMIC (EYE) PRN
Status: DISCONTINUED | OUTPATIENT
Start: 2017-06-28 | End: 2017-06-28 | Stop reason: HOSPADM

## 2017-06-28 RX ORDER — NALOXONE HYDROCHLORIDE 0.4 MG/ML
.1-.4 INJECTION, SOLUTION INTRAMUSCULAR; INTRAVENOUS; SUBCUTANEOUS
Status: DISCONTINUED | OUTPATIENT
Start: 2017-06-28 | End: 2017-06-28 | Stop reason: HOSPADM

## 2017-06-28 RX ORDER — ONDANSETRON 4 MG/1
4 TABLET, ORALLY DISINTEGRATING ORAL EVERY 30 MIN PRN
Status: DISCONTINUED | OUTPATIENT
Start: 2017-06-28 | End: 2017-06-28 | Stop reason: HOSPADM

## 2017-06-28 RX ADMIN — CYCLOPENTOLATE HYDROCHLORIDE 1 DROP: 10 SOLUTION/ DROPS OPHTHALMIC at 11:58

## 2017-06-28 RX ADMIN — PHENYLEPHRINE HYDROCHLORIDE 1 DROP: 2.5 SOLUTION/ DROPS OPHTHALMIC at 11:58

## 2017-06-28 RX ADMIN — TROPICAMIDE 1 DROP: 10 SOLUTION/ DROPS OPHTHALMIC at 11:58

## 2017-06-28 RX ADMIN — PROPARACAINE HYDROCHLORIDE 1 DROP: 5 SOLUTION/ DROPS OPHTHALMIC at 11:46

## 2017-06-28 RX ADMIN — PHENYLEPHRINE HYDROCHLORIDE 1 DROP: 2.5 SOLUTION/ DROPS OPHTHALMIC at 11:52

## 2017-06-28 RX ADMIN — MIDAZOLAM HYDROCHLORIDE 1 MG: 1 INJECTION, SOLUTION INTRAMUSCULAR; INTRAVENOUS at 12:34

## 2017-06-28 RX ADMIN — CYCLOPENTOLATE HYDROCHLORIDE 1 DROP: 10 SOLUTION/ DROPS OPHTHALMIC at 11:52

## 2017-06-28 RX ADMIN — Medication 1 DROP: at 12:51

## 2017-06-28 RX ADMIN — SODIUM CHONDROITIN SULFATE / SODIUM HYALURONATE 1 ML: 0.55-0.5 INJECTION INTRAOCULAR at 12:43

## 2017-06-28 RX ADMIN — SODIUM CHLORIDE, POTASSIUM CHLORIDE, SODIUM LACTATE AND CALCIUM CHLORIDE 500 ML: 600; 310; 30; 20 INJECTION, SOLUTION INTRAVENOUS at 12:05

## 2017-06-28 RX ADMIN — PHENYLEPHRINE HYDROCHLORIDE 1 DROP: 2.5 SOLUTION/ DROPS OPHTHALMIC at 11:46

## 2017-06-28 RX ADMIN — TROPICAMIDE 1 DROP: 10 SOLUTION/ DROPS OPHTHALMIC at 11:46

## 2017-06-28 RX ADMIN — EPINEPHRINE 500 ML: 1 INJECTION, SOLUTION, CONCENTRATE INTRAVENOUS at 12:43

## 2017-06-28 RX ADMIN — CYCLOPENTOLATE HYDROCHLORIDE 1 DROP: 10 SOLUTION/ DROPS OPHTHALMIC at 11:46

## 2017-06-28 RX ADMIN — TROPICAMIDE 1 DROP: 10 SOLUTION/ DROPS OPHTHALMIC at 11:52

## 2017-06-28 RX ADMIN — LIDOCAINE HYDROCHLORIDE 1 ML: 10 INJECTION, SOLUTION EPIDURAL; INFILTRATION; INTRACAUDAL; PERINEURAL at 12:05

## 2017-06-28 RX ADMIN — LIDOCAINE HYDROCHLORIDE 1 ML: 10 INJECTION, SOLUTION EPIDURAL; INFILTRATION; INTRACAUDAL; PERINEURAL at 12:43

## 2017-06-28 RX ADMIN — BALANCED SALT SOLUTION 15 ML: 6.4; .75; .48; .3; 3.9; 1.7 SOLUTION OPHTHALMIC at 12:42

## 2017-06-28 RX ADMIN — MIDAZOLAM HYDROCHLORIDE 2 MG: 1 INJECTION, SOLUTION INTRAMUSCULAR; INTRAVENOUS at 12:30

## 2017-06-28 RX ADMIN — Medication 1 DROP: at 11:46

## 2017-06-28 ASSESSMENT — ENCOUNTER SYMPTOMS
SEIZURES: 0
DYSRHYTHMIAS: 0
ORTHOPNEA: 0

## 2017-06-28 ASSESSMENT — COPD QUESTIONNAIRES: COPD: 0

## 2017-06-28 NOTE — IP AVS SNAPSHOT
MRN:[id number]                      After Visit Summary   6/28/2017    Connor Srinivasan    MRN: [id number]           Thank you!     Thank you for choosing [facility] for your care. Our goal is always to provide you with excellent care. Hearing back from our patients is one way we can continue to improve our services. Please take a few minutes to complete the written survey that you may receive in the mail after you visit with us. Thank you!        Patient Information     Date Of Birth          1945        About your hospital stay     You were admitted on:  June 28, 2017 You last received care in the:  [facility] [place]    You were discharged on:  June 28, 2017       Who to Call     For medical emergencies, please call 911.  For non-urgent questions about your medical care, please call your primary care provider or clinic, [phone]  For questions related to your surgery, please call your surgery clinic        Attending Provider     Provider Specialty    John Sanchez MD Ophthalmology       Primary Care Provider Office Phone # Fax #    Roverto Edwards [phone]      Your next 10 appointments already scheduled     Jun 28, 2017   Procedure with John Sanchez MD   [facility] (--)    [address]  [facility] [address] [phone]              Further instructions from your care team       [facility] Discharge  Instructions  Anesthesia ([facility] [place])   Adult Discharge Instructions    For 24 hours after surgery    1. Get plenty of rest.  Make arrangements to have a responsible adult stay with you for at least 6 hours after you leave the hospital.  2. Do not drive or use heavy equipment for 24 hours.    3. Do not drink alcohol for 24 hours.  4. Do not sign legal documents or make important decisions for 24 hours.  5. Avoid strenuous or risky activities. You may feel  "lightheaded.  If so, sit for a few minutes before standing.  Have someone help you get up.   6. Conscious sedation patients may resume a regular diet..  7. Any questions of medical nature, call your physician.    [facility]  Cataract Surgery Discharge Instructions  [facility] MD COLEEN Langley MD J. Hasan, MD C. Nichols, MD J. O'Neill, MD S. Schaefer, MD J. Stephens, MD        Start using drops when you arrive at home today    Vigamox (Tan top) - one drop in surgical eye 3 times  per day until gone.    Prednisolone acetate 1.0% (pink or white top) - one drop in surgical eye 3 times per day until gone.    Ketorolac (Grey top) - one drop in surgical eye 3 times per day until gone.           Place shield over surgical eye at bedtime for 3 nights.      The eye will feel itchy, scratchy, and vision will be blurred, you may take Tylenol for the scratchy feeling if this is bothersome.      No eye rubbing or swimming for I week.      You may resume all prescription medications as directed by your primary doctor.      Call if increasing pain, progressively worsening vision or worsening redness of surgical eye.      On-call doctor can be reached at [phone].    Pending Results     No orders found from 6/26/2017 to 6/29/2017.            Admission Information     Date & Time Provider Department Dept. Phone    6/28/2017 John Sanchez MD [facility] [place] [phone]      Your Vitals Were     Blood Pressure Pulse Temperature Respirations Height Weight    118/72 57 97.5  F (36.4  C) (Temporal) 16 1.753 m (5' 9\") 110.1 kg (242 lb 12.8 oz)    Pulse Oximetry BMI (Body Mass Index)                97% 35.86 kg/m2          [employer] Information     [employer] gives you secure access to your electronic health record. If you see a primary care provider, you can also send messages to your care team and make appointments. If " you have questions, please call your primary care clinic.  If you do not have a primary care provider, please call [phone] and they will assist you.        Care EveryWhere ID     This is your Care EveryWhere ID. This could be used by other organizations to access your [facility] medical records  [id number]        Equal Access to Services     BALMONO HINA : Hadii anita milian hadasho Sobenitoali, waaxda luqadaha, qaybta kaalmada danielisakamy, suzy hornshannansusana egan . So [facility] [phone].    ATENCIÓN: Si habla español, tiene a alvarez disposición servicios gratuitos de asistencia lingüística. Llame al [phone].    We comply with applicable federal civil rights laws and [place] laws. We do not discriminate on the basis of race, color, national origin, age, disability sex, sexual orientation or gender identity.               Review of your medicines      UNREVIEWED medicines. Ask your doctor about these medicines        Dose / Directions    aspirin 81 MG tablet   Used for:  Routine general medical examination at a health care facility, Other and unspecified hyperlipidemia        ONE DAILY   Quantity:  100   Refills:  3       atenolol 50 MG tablet   Commonly known as:  TENORMIN   Used for:  Benign essential hypertension        Dose:  50 mg   Take 1 tablet (50 mg) by mouth daily   Quantity:  90 tablet   Refills:  3       fluticasone 50 MCG/ACT spray   Commonly known as:  FLONASE   Used for:  Chronic rhinitis        Dose:  2 spray   Spray 2 sprays into both nostrils daily   Quantity:  48 g   Refills:  0       hydrochlorothiazide 25 MG tablet   Commonly known as:  HYDRODIURIL   Used for:  Benign essential hypertension        Dose:  25 mg   Take 1 tablet (25 mg) by mouth daily   Quantity:  90 tablet   Refills:  3       simvastatin 40 MG tablet   Commonly known as:  ZOCOR   Used for:  Hyperlipidemia LDL goal <130        Dose:  40 mg   Take 1 tablet (40 mg) by mouth At Bedtime   Quantity:  90 tablet   Refills:  3        tadalafil 20 MG tablet   Commonly known as:  CIALIS   Used for:  Impotence of organic origin        Take one tablet by mouth. TAKE AT LEAST 30 MINUTES BEFORE INTERCOURSE,   Quantity:  16 tablet   Refills:  3                Protect others around you: Learn how to safely use, store and throw away your medicines at www.disposemymeds.org.             Medication List: This is a list of all your medications and when to take them. Check marks below indicate your daily home schedule. Keep this list as a reference.      Medications           Morning Afternoon Evening Bedtime As Needed    aspirin 81 MG tablet   ONE DAILY                                atenolol 50 MG tablet   Commonly known as:  TENORMIN   Take 1 tablet (50 mg) by mouth daily                                fluticasone 50 MCG/ACT spray   Commonly known as:  FLONASE   Spray 2 sprays into both nostrils daily                                hydrochlorothiazide 25 MG tablet   Commonly known as:  HYDRODIURIL   Take 1 tablet (25 mg) by mouth daily                                simvastatin 40 MG tablet   Commonly known as:  ZOCOR   Take 1 tablet (40 mg) by mouth At Bedtime                                tadalafil 20 MG tablet   Commonly known as:  CIALIS   Take one tablet by mouth. TAKE AT LEAST 30 MINUTES BEFORE INTERCOURSE,

## 2017-06-28 NOTE — ANESTHESIA PREPROCEDURE EVALUATION
Procedure: Procedure(s):  PHACOEMULSIFICATION CLEAR CORNEA WITH STANDARD INTRAOCULAR LENS IMPLANT  Preop diagnosis: CATARACT     Allergies   Allergen Reactions     No Known Drug Allergies      Past Medical History:   Diagnosis Date     Chronic rhinitis      Localized osteoarthrosis not specified whether primary or secondary, lower leg      Onychomycoses      Other and unspecified hyperlipidemia      Unspecified essential hypertension      Past Surgical History:   Procedure Laterality Date     C TOTAL KNEE ARTHROPLASTY  1/2004    Knee Replacement, Total (Right)     C TOTAL KNEE ARTHROPLASTY  10/2003    Knee Replacement, Total (Left)     HC COLONOSCOPY THRU STOMA, DIAGNOSTIC  4/3/2006    Normal     HC EXCISION PILONIDAL LESION SIMPLE  1966     PHACOEMULSIFICATION CLEAR CORNEA WITH STANDARD INTRAOCULAR LENS IMPLANT Right 6/12/2017    Procedure: PHACOEMULSIFICATION CLEAR CORNEA WITH STANDARD INTRAOCULAR LENS IMPLANT;  RIGHT PHACOEMULSIFICATION CLEAR CORNEA WITH STANDARD INTRAOCULAR LENS IMPLANT ;  Surgeon: John Sanchez MD;  Location: [facility]     Prior to Admission medications    Medication Sig Start Date End Date Taking? Authorizing Provider   tadalafil (CIALIS) 20 MG tablet Take one tablet by mouth. TAKE AT LEAST 30 MINUTES BEFORE INTERCOURSE, 5/26/17  Yes Roverto Edwards MD   hydrochlorothiazide (HYDRODIURIL) 25 MG tablet Take 1 tablet (25 mg) by mouth daily 5/26/17  Yes Roverto Edwards MD   atenolol (TENORMIN) 50 MG tablet Take 1 tablet (50 mg) by mouth daily 5/26/17  Yes Roverto Edwards MD   simvastatin (ZOCOR) 40 MG tablet Take 1 tablet (40 mg) by mouth At Bedtime 5/26/17  Yes Roverto Edwards MD   fluticasone (FLONASE) 50 MCG/ACT spray Spray 2 sprays into both nostrils daily 4/26/17  Yes Roverto Edwards MD   ASPIRIN 81 MG OR TABS ONE DAILY 4/23/08  Yes Roverto Edwards MD     Current Facility-Administered Medications Ordered in Epic   Medication Dose Route Frequency Last Rate Last Dose     proparacaine  (ALCAINE) 0.5 % ophthalmic solution 1 drop  1 drop Ophthalmic Once         cyclopentolate (CYCLOGYL) 1 % ophthalmic solution 1 drop  1 drop Ophthalmic q5 Min Prior to Surgery         phenylephrine (MYDFRIN /JAIMEE-SYNEPHRINE) 2.5 % ophthalmic solution 1 drop  1 drop Ophthalmic q5 Min Prior to Surgery         tropicamide (MYDRIACYL) 1 % ophthalmic solution 1 drop  1 drop Ophthalmic q5 Min Prior to Surgery         lidocaine (AKTEN) ophthalmic gel 0.5 mL  0.5 mL Ophthalmic Once         proparacaine (ALCAINE) 0.5 % ophthalmic solution 1 drop  1 drop Ophthalmic Once         povidone-iodine 5 % ophthalmic solution 1 drop  1 drop Ophthalmic Once         lidocaine 1 % 1 mL  1 mL Other Q1H PRN         lactated ringers infusion  500 mL Intravenous Continuous         No current [facility]-ordered outpatient prescriptions on file.     Wt Readings from Last 1 Encounters:   06/07/17 110.1 kg (242 lb 12.8 oz)     Temp Readings from Last 1 Encounters:   06/07/17 36.7  C (98.1  F) (Oral)     BP Readings from Last 6 Encounters:   06/12/17 112/67   06/07/17 114/70   05/26/17 134/70   04/21/16 131/82   03/19/15 136/66   03/26/14 122/68     Pulse Readings from Last 4 Encounters:   06/07/17 75   05/26/17 67   04/21/16 80   03/19/15 71     Resp Readings from Last 1 Encounters:   06/12/17 14     SpO2 Readings from Last 1 Encounters:   06/12/17 96%     Recent Labs   Lab Test  05/26/17   1003  04/21/16   1115   NA  142  141   POTASSIUM  4.0  4.5   CHLORIDE  108  107   CO2  24  28   ANIONGAP  10  6   GLC  133*  128*   BUN  20  20   CR  0.73  0.82   BARON  9.0  8.9     Recent Labs   Lab Test  05/26/17   1003  04/21/16   1115   WBC  3.1*  3.3*   HGB  15.8  16.2   PLT  102*  112*     RECENT LABS:   ECG:   ECHO:   CXR:      Anesthesia Evaluation     . Pt has had prior anesthetic.            ROS/MED HX    ENT/Pulmonary:     (+)allergic rhinitis, , . .   (-) asthma, COPD, sleep apnea and recent URI   Neurologic:      (-) seizures and CVA   Cardiovascular:      (+) Dyslipidemia, hypertension----. : . . . :. .      (-) angina, CAD, orthopnea/PND, syncope, arrhythmias, irregular heartbeat/palpitations, valvular problems/murmurs and angina   METS/Exercise Tolerance:  >4 METS   Hematologic:         Musculoskeletal:         GI/Hepatic:        (-) GERD and liver disease   Renal/Genitourinary:      (-) renal disease   Endo:      (-) Type II DM and thyroid disease   Psychiatric:         Infectious Disease:         Malignancy:         Other:                     Physical Exam  Normal systems: dental    Airway   Mallampati: II  TM distance: >3 FB  Neck ROM: full    Dental     Cardiovascular   Rhythm and rate: regular and normal  (-) no murmur    Pulmonary    breath sounds clear to auscultation(-) no wheezes                        Anesthesia Plan      History & Physical Review  History and physical reviewed and following examination; no interval change.    ASA Status:  2 .    NPO Status:  > 8 hours    Plan for MAC   PONV prophylaxis:  Ondansetron (or other 5HT-3)       Postoperative Care  Postoperative pain management:  Multi-modal analgesia.      Consents  Anesthetic plan, risks, benefits and alternatives discussed with:  Patient..

## 2017-06-28 NOTE — OP NOTE
PREOPERATIVE DIAGNOSIS: Visually significant cataract, Left eye   POSTOPERATIVE DIAGNOSIS: Same   PROCEDURES:   1. Cataract extraction with intraocular lens implant Left eye.  SURGEON: John Sanchez M.D.  INDICATIONS: The patient Connor Srinivasan presented to the eye clinic with decreased vision secondary to cataract in the Left eye. The risks, including, but not limited to infection, loss of vision, loss of eye, need for more surgery, and bleeding, along with the benefits, alternatives, expectations, and the procedure itself were discussed at length with the patient who wished to proceed with surgery. All questions were answered to the patient's satisfaction.  DESCRIPTION OF PROCEDURE:   Prior to the procedure, appropriate cardiac and respiratory monitors were applied to the patient.  In the pre-operative holding area, a drop of topical tetracaine followed by povidone iodine followed by lidocaine gel.  The patient was brought to the operating room where a surgical pause was carried out to identify with all members of the surgical team the correct surgical site.  With adequate anesthesia, the Left eye was prepped and draped in the usual sterile fashion. A lid speculum was placed, and the operating microscope was rotated into position. A paracentesis was created.  Through this limbal paracentesis, the anterior chamber was filled with preservative-free lidocaine followed by viscoelastic.   A temporal clear corneal incision was created at the limbus using a 2.6 mm blade. A capsulorrhexis was initiated using a cystotome and was completed in continuous and circular fashion using the capsulorrhexis forceps. The lens nucleus was hydrodissected using balanced salt solution.  The lens nucleus was rotated and removed using phacoemulsification in a stop and chop technique.  Residual cortical material was removed using irrigation-aspiration.  The capsular bag was reinflated to its maximal extent with cohesive  viscoelastic.  A 19.5 diopter ZCBOO was inserted into the capsular bag and noted to be well centered.  The lens power selected was reviewed using the intraocular lens power measurements that were obtained preoperatively to confirm that the correct lens was selected for the desired post-operative refractive state. The residual viscoelastic was aspirated. The anterior chamber was inflated with balanced salt solution and the wounds were hydrated and found to be self-sealing.  The eye was palpated and found to be of normal physiologic pressure. The lid speculum was removed. One drop of antibiotic and prednisolone were instilled in the eye and a clear shield placed over the eye. The patient tolerated the procedure well and there were no intraoperative complications.      PLAN: The patient will be discharged to home and will follow up tomorrow in the eye clinic.  EBL:  None  Complications:  None    Implant Name Type Inv. Item Serial No.  Lot No. LRB No. Used   EYE IMP IOL FIOR PCL TECNIS ZCB00 19.5 Lens/Eye Implant EYE IMP IOL FIOR PCL TECNIS ZCB00 19.5 [id number] [employer]   Left 1

## 2017-06-28 NOTE — IP AVS SNAPSHOT
[facility] [place]    [address]    [place] [address]    Phone:  [phone]    Fax:  [phone]                                       After Visit Summary   6/28/2017    Connor Srinivasan    MRN: [id number]           After Visit Summary Signature Page     I have received my discharge instructions, and my questions have been answered. I have discussed any challenges I see with this plan with the nurse or doctor.    ..........................................................................................................................................  Patient/Patient Representative Signature      ..........................................................................................................................................  Patient Representative Print Name and Relationship to Patient    ..................................................               ................................................  Date                                            Time    ..........................................................................................................................................  Reviewed by Signature/Title    ...................................................              ..............................................  Date                                                            Time

## 2017-06-28 NOTE — ANESTHESIA POSTPROCEDURE EVALUATION
Patient: Connor Srinivasan    Procedure(s):  LEFT PHACOEMULSIFICATION CLEAR CORNEA WITH STANDARD INTRAOCULAR LENS IMPLANT  - Wound Class: I-Clean    Diagnosis:CATARACT   Diagnosis Additional Information: No value filed.    Anesthesia Type:  MAC    Note:  Anesthesia Post Evaluation    Patient location during evaluation: bedside  Patient participation: Able to fully participate in evaluation  Level of consciousness: awake  Pain management: adequate  Airway patency: patent  Cardiovascular status: acceptable  Respiratory status: acceptable  Hydration status: acceptable  PONV: none     Anesthetic complications: None          Last vitals:  Vitals:    06/28/17 1154 06/28/17 1257 06/28/17 1300   BP: 127/72 118/72 125/69   Pulse: 57     Resp: 14 16 16   Temp: 36.4  C (97.5  F)     SpO2: 97% 97% (!) 1%         Electronically Signed By: Xavier Abraham DO, DO  June 28, 2017  1:56 PM

## 2017-06-28 NOTE — DISCHARGE INSTRUCTIONS
[facility] Discharge  Instructions  Anesthesia ([facility])   Adult Discharge Instructions    For 24 hours after surgery    1. Get plenty of rest.  Make arrangements to have a responsible adult stay with you for at least 6 hours after you leave the hospital.  2. Do not drive or use heavy equipment for 24 hours.    3. Do not drink alcohol for 24 hours.  4. Do not sign legal documents or make important decisions for 24 hours.  5. Avoid strenuous or risky activities. You may feel lightheaded.  If so, sit for a few minutes before standing.  Have someone help you get up.   6. Conscious sedation patients may resume a regular diet..  7. Any questions of medical nature, call your physician.    [facility]  Cataract Surgery Discharge Instructions  [facility] MD COLEEN Langley MD J. Hasan, MD C. Nichols, MD J. O'Neill, MD S. Schaefer, MD J. Stephens, MD        Start using drops when you arrive at home today    Vigamox (Tan top) - one drop in surgical eye 3 times  per day until gone.    Prednisolone acetate 1.0% (pink or white top) - one drop in surgical eye 3 times per day until gone.    Ketorolac (Grey top) - one drop in surgical eye 3 times per day until gone.           Place shield over surgical eye at bedtime for 3 nights.      The eye will feel itchy, scratchy, and vision will be blurred, you may take Tylenol for the scratchy feeling if this is bothersome.      No eye rubbing or swimming for I week.      You may resume all prescription medications as directed by your primary doctor.      Call if increasing pain, progressively worsening vision or worsening redness of surgical eye.      On-call doctor can be reached at [phone].

## 2017-06-28 NOTE — ANESTHESIA CARE TRANSFER NOTE
Patient: Connor Srinivasan    Procedure(s):  LEFT PHACOEMULSIFICATION CLEAR CORNEA WITH STANDARD INTRAOCULAR LENS IMPLANT  - Wound Class: I-Clean    Diagnosis: CATARACT   Diagnosis Additional Information: No value filed.    Anesthesia Type:   MAC     Note:  Airway :Room Air  Patient transferred to:PACU  Comments: Transferred to [facility] recovery room in recliner with armrests up, spontaneous respirations, O2 saturation maintained >90 on RA. All monitors and alarms on and functioning, clinically stable vital signs. Report given to recovery RN and questions answered. Patient alert and following verbal directions.      Vitals: (Last set prior to Anesthesia Care Transfer)    CRNA VITALS  6/28/2017 1223 - 6/28/2017 1256      6/28/2017             Ht Rate: 57    Resp Rate (set): 10                Electronically Signed By: JOSE Contreras CRNA  June 28, 2017  12:56 PM

## 2017-08-02 DIAGNOSIS — I10 BENIGN ESSENTIAL HYPERTENSION: ICD-10-CM

## 2017-08-02 RX ORDER — HYDROCHLOROTHIAZIDE 25 MG/1
25 TABLET ORAL DAILY
Qty: 90 TABLET | Refills: 3 | OUTPATIENT
Start: 2017-08-02

## 2017-08-02 RX ORDER — ATENOLOL 50 MG/1
50 TABLET ORAL DAILY
Qty: 90 TABLET | Refills: 3 | OUTPATIENT
Start: 2017-08-02

## 2017-08-08 ENCOUNTER — MYC MEDICAL ADVICE (OUTPATIENT)
Dept: INTERNAL MEDICINE | Facility: CLINIC | Age: 72
End: 2017-08-08

## 2017-08-08 DIAGNOSIS — I10 BENIGN ESSENTIAL HYPERTENSION: ICD-10-CM

## 2017-08-09 ENCOUNTER — MYC MEDICAL ADVICE (OUTPATIENT)
Dept: INTERNAL MEDICINE | Facility: CLINIC | Age: 72
End: 2017-08-09

## 2017-08-09 RX ORDER — METOPROLOL SUCCINATE 100 MG/1
100 TABLET, EXTENDED RELEASE ORAL DAILY
Qty: 90 TABLET | Refills: 3 | Status: SHIPPED | OUTPATIENT
Start: 2017-08-09 | End: 2018-05-29

## 2018-02-18 NOTE — TELEPHONE ENCOUNTER
Bed: ED32  Expected date: 2/17/18  Expected time: 6:54 PM  Means of arrival: Ambulance  Comments:  Renée Michael   hydrochlorothiazide (HYDRODIURIL) 25 MG tablet      Last Written Prescription Date: 05/26/17  Last Fill Quantity: 90, # refills: 3  Last Office Visit with BRYANNA, SHANITA or  Health prescribing provider: 06/07/17 jennifer Barry       Potassium   Date Value Ref Range Status   05/26/2017 4.0 3.4 - 5.3 mmol/L Final     Creatinine   Date Value Ref Range Status   05/26/2017 0.73 0.66 - 1.25 mg/dL Final     BP Readings from Last 3 Encounters:   06/28/17 125/69   06/12/17 112/67   06/07/17 114/70         atenolol (TENORMIN) 50 MG tablet      Last Written Prescription Date:5 /25/17  Last Fill Quantity: 90, # refills: 3/    Last Office Visit with ETHEL, SHANITA or [facility] prescribing provider:  06/07/17 JENNIFER Barry   Future Office Visit:        BP Readings from Last 3 Encounters:   06/28/17 125/69   06/12/17 112/67   06/07/17 114/70

## 2018-02-22 ENCOUNTER — OFFICE VISIT (OUTPATIENT)
Dept: INTERNAL MEDICINE | Facility: CLINIC | Age: 73
End: 2018-02-22
Payer: COMMERCIAL

## 2018-02-22 VITALS
BODY MASS INDEX: 35.7 KG/M2 | WEIGHT: 241 LBS | DIASTOLIC BLOOD PRESSURE: 60 MMHG | SYSTOLIC BLOOD PRESSURE: 130 MMHG | TEMPERATURE: 98 F | HEART RATE: 74 BPM | HEIGHT: 69 IN | OXYGEN SATURATION: 97 %

## 2018-02-22 DIAGNOSIS — S81.802A OPEN WOUND OF LEFT LOWER EXTREMITY, INITIAL ENCOUNTER: Primary | ICD-10-CM

## 2018-02-22 DIAGNOSIS — I83.90 VARICOSE VEIN OF LEG: ICD-10-CM

## 2018-02-22 DIAGNOSIS — I87.2 VENOUS STASIS DERMATITIS OF BOTH LOWER EXTREMITIES: ICD-10-CM

## 2018-02-22 PROCEDURE — 99213 OFFICE O/P EST LOW 20 MIN: CPT | Performed by: FAMILY MEDICINE

## 2018-02-22 NOTE — PROGRESS NOTES
"CHIEF COMPLAINT    Bleeding area L leg      HISTORY    Last PM he tore a small piece of skin on lateral aspect L leg which then caused brisk bleeding. He feels he tore into a varicose vein. Pressure was applied and then bandaged which controlled.    He is on ASA 81 mg.    Patient Active Problem List   Diagnosis     Essential hypertension     Localized osteoarthrosis, lower leg     Chronic rhinitis     ERECTILE DYSFUNCTION     Overweight     HYPERLIPIDEMIA LDL GOAL <130     Advanced directives, counseling/discussion     Health Care Home     Other abnormal glucose     Obesity, BMI>35 with comorbidity     Current Outpatient Prescriptions   Medication Sig Dispense Refill     metoprolol (TOPROL-XL) 100 MG 24 hr tablet Take 1 tablet (100 mg) by mouth daily 90 tablet 3     tadalafil (CIALIS) 20 MG tablet Take one tablet by mouth. TAKE AT LEAST 30 MINUTES BEFORE INTERCOURSE, 16 tablet 3     hydrochlorothiazide (HYDRODIURIL) 25 MG tablet Take 1 tablet (25 mg) by mouth daily 90 tablet 3     simvastatin (ZOCOR) 40 MG tablet Take 1 tablet (40 mg) by mouth At Bedtime 90 tablet 3     fluticasone (FLONASE) 50 MCG/ACT spray Spray 2 sprays into both nostrils daily 48 g 0     ASPIRIN 81 MG OR TABS ONE DAILY 100 3       REVIEW OF SYSTEMS    Unremarkable except as above.      EXAM  /60  Pulse 74  Temp 98  F (36.7  C) (Oral)  Ht 5' 9\" (1.753 m)  Wt 241 lb (109.3 kg)  SpO2 97%  BMI 35.59 kg/m2    L leg:  Stasis dermatitis.  Small area lat aspect - a scratch 2-3 cm noted w/o active bleeding.    No significant edema.    Telfa, Bacitracin, Ace applied.      (D97.723U) Open wound of left lower extremity, initial encounter  (primary encounter diagnosis)  Comment:   Plan:     (I83.90) Varicose vein of leg  Comment:   Plan:     (I87.2) Venous stasis dermatitis of both lower extremities  Comment:   Plan:       Discussed bandaging until healed.  Compression stockings and moisturizing lotion recommended.      "

## 2018-02-22 NOTE — NURSING NOTE
"Chief Complaint   Patient presents with     Derm Problem   yesterday , pulled a skin tag on right ankle and it bled all over .    Initial /60  Pulse 74  Temp 98  F (36.7  C) (Oral)  Ht 5' 9\" (1.753 m)  Wt 241 lb (109.3 kg)  SpO2 97%  BMI 35.59 kg/m2 Estimated body mass index is 35.59 kg/(m^2) as calculated from the following:    Height as of this encounter: 5' 9\" (1.753 m).    Weight as of this encounter: 241 lb (109.3 kg).  Medication Reconciliation: complete    "

## 2018-02-22 NOTE — MR AVS SNAPSHOT
"              After Visit Summary   2/22/2018    Connor Srinivasan    MRN: [id number]           Patient Information     Date Of Birth          1945        Visit Information        Provider Department      2/22/2018 8:00 AM Nicholas Cid MD [facility]        Today's Diagnoses     Open wound of left lower extremity, initial encounter    -  1    Varicose vein of leg        Venous stasis dermatitis of both lower extremities           Follow-ups after your visit        Who to contact     If you have questions or need follow up information about today's clinic visit or your schedule please contact [facility] directly at [phone].  Normal or non-critical lab and imaging results will be communicated to you by MyChart, letter or phone within 4 business days after the clinic has received the results. If you do not hear from us within 7 days, please contact the clinic through [employer] or phone. If you have a critical or abnormal lab result, we will notify you by phone as soon as possible.  Submit refill requests through [employer] or call your pharmacy and they will forward the refill request to us. Please allow 3 business days for your refill to be completed.          Additional Information About Your Visit        MyChart Information     [employer] gives you secure access to your electronic health record. If you see a primary care provider, you can also send messages to your care team and make appointments. If you have questions, please call your primary care clinic.  If you do not have a primary care provider, please call [phone] and they will assist you.        Care EveryWhere ID     This is your Care EveryWhere ID. This could be used by other organizations to access your [place] medical records  [id number]        Your Vitals Were     Pulse Temperature Height Pulse Oximetry BMI (Body Mass Index)       74 98  F (36.7  C) (Oral) 5' 9\" (1.753 m) 97% 35.59 kg/m2        " Blood Pressure from Last 3 Encounters:   02/22/18 130/60   06/28/17 125/69   06/12/17 112/67    Weight from Last 3 Encounters:   02/22/18 241 lb (109.3 kg)   06/28/17 242 lb 12.8 oz (110.1 kg)   06/07/17 242 lb 12.8 oz (110.1 kg)              Today, you had the following     No orders found for display       Primary Care Provider Office Phone # Fax #    Roverto Edwards [phone]       [address] [address]        Equal Access to Services     [facility]: Hadii aad ku hadasho Soomaali, waaxda luqadaha, qaybta kaalmada adeegyada, suzy egan . So [facility] [phone].    ATENCIÓN: Si habla español, tiene a alvarez disposición servicios gratuitos de asistencia lingüística. [facility] [phone].    We comply with applicable federal civil rights laws and [place] laws. We do not discriminate on the basis of race, color, national origin, age, disability, sex, sexual orientation, or gender identity.            Thank you!     Thank you for choosing [facility]  for your care. Our goal is always to provide you with excellent care. Hearing back from our patients is one way we can continue to improve our services. Please take a few minutes to complete the written survey that you may receive in the mail after your visit with us. Thank you!             Your Updated Medication List - Protect others around you: Learn how to safely use, store and throw away your medicines at www.disposemymeds.org.          This list is accurate as of 2/22/18  9:02 AM.  Always use your most recent med list.                   Brand Name Dispense Instructions for use Diagnosis    aspirin 81 MG tablet     100    ONE DAILY    Routine general medical examination at a health care facility, Other and unspecified hyperlipidemia       fluticasone 50 MCG/ACT spray    FLONASE    48 g    Spray 2 sprays into both nostrils daily    Chronic rhinitis       hydrochlorothiazide 25 MG  tablet    HYDRODIURIL    90 tablet    Take 1 tablet (25 mg) by mouth daily    Benign essential hypertension       metoprolol succinate 100 MG 24 hr tablet    TOPROL-XL    90 tablet    Take 1 tablet (100 mg) by mouth daily    Benign essential hypertension       simvastatin 40 MG tablet    ZOCOR    90 tablet    Take 1 tablet (40 mg) by mouth At Bedtime    Hyperlipidemia LDL goal <130       tadalafil 20 MG tablet    CIALIS    16 tablet    Take one tablet by mouth. TAKE AT LEAST 30 MINUTES BEFORE INTERCOURSE,    Impotence of organic origin

## 2018-04-03 ENCOUNTER — TELEPHONE (OUTPATIENT)
Dept: INTERNAL MEDICINE | Facility: CLINIC | Age: 73
End: 2018-04-03

## 2018-04-03 NOTE — TELEPHONE ENCOUNTER
Panel Management Review      Patient has the following on his problem list:       IVD   ASA: Passed    Last LDL:    Lab Results   Component Value Date    CHOL 117 05/26/2017     Lab Results   Component Value Date    HDL 26 05/26/2017     Lab Results   Component Value Date    LDL 69 05/26/2017     Lab Results   Component Value Date    TRIG 108 05/26/2017        Lab Results   Component Value Date    CHOLHDLRATIO 3.4 03/19/2015        Is the patient on a Statin? YES   Is the patient on Aspirin? YES                  Medications     HMG CoA Reductase Inhibitors    simvastatin (ZOCOR) 40 MG tablet    Salicylates    ASPIRIN 81 MG OR TABS          Last three blood pressure readings:  BP Readings from Last 3 Encounters:   02/22/18 130/60   06/28/17 125/69   06/12/17 112/67        Tobacco History:     History   Smoking Status     Never Smoker   Smokeless Tobacco     Never Used       Hypertension   Last three blood pressure readings:  BP Readings from Last 3 Encounters:   02/22/18 130/60   06/28/17 125/69   06/12/17 112/67     Blood pressure: Passed    HTN Guidelines:  Age 18-59 BP range:  Less than 140/90  Age 60-85 with Diabetes:  Less than 140/90  Age 60-85 without Diabetes:  less than 150/90      Composite cancer screening  Chart review shows that this patient is due/due soon for the following Colonoscopy  Summary:    Patient is due/failing the following:   BP CHECK, COLONOSCOPY, FOLLOW UP and LDL    Action needed:   Patient needs office visit for above.    Type of outreach:    None, patient has appointment 5/29/2018 with Dr. Edwards    Questions for provider review:    None                                                                                                                                    Rajan BAKER       [place] [facility] [place]

## 2018-04-10 ENCOUNTER — TELEPHONE (OUTPATIENT)
Dept: INTERNAL MEDICINE | Facility: CLINIC | Age: 73
End: 2018-04-10

## 2018-04-10 NOTE — TELEPHONE ENCOUNTER
Panel Management Review      Patient has the following on his problem list:       IVD   ASA: Passed    Last LDL:    Lab Results   Component Value Date    CHOL 117 05/26/2017     Lab Results   Component Value Date    HDL 26 05/26/2017     Lab Results   Component Value Date    LDL 69 05/26/2017     Lab Results   Component Value Date    TRIG 108 05/26/2017        Lab Results   Component Value Date    CHOLHDLRATIO 3.4 03/19/2015        Is the patient on a Statin? YES   Is the patient on Aspirin? YES                  Medications     HMG CoA Reductase Inhibitors    simvastatin (ZOCOR) 40 MG tablet    Salicylates    ASPIRIN 81 MG OR TABS          Last three blood pressure readings:  BP Readings from Last 3 Encounters:   02/22/18 130/60   06/28/17 125/69   06/12/17 112/67        Tobacco History:     History   Smoking Status     Never Smoker   Smokeless Tobacco     Never Used       Hypertension   Last three blood pressure readings:  BP Readings from Last 3 Encounters:   02/22/18 130/60   06/28/17 125/69   06/12/17 112/67     Blood pressure: Passed    HTN Guidelines:  Age 18-59 BP range:  Less than 140/90  Age 60-85 with Diabetes:  Less than 140/90  Age 60-85 without Diabetes:  less than 150/90      Composite cancer screening  Chart review shows that this patient is due/due soon for the following Colonoscopy  Summary:    Patient is due/failing the following:   COLONOSCOPY    Action needed:   colonoscopy    Type of outreach:    none, patient has appointment 5/29/2018, colonoscopy to be addressed at that time.    Questions for provider review:    None                                                                                                                                    Rajan BAKER       Chart routed to closed .

## 2018-05-28 ASSESSMENT — ACTIVITIES OF DAILY LIVING (ADL)
I_NEED_ASSISTANCE_FOR_THE_FOLLOWING_DAILY_ACTIVITIES:: NO ASSISTANCE IS NEEDED
CURRENT_FUNCTION: NO ASSISTANCE NEEDED

## 2018-05-29 ENCOUNTER — OFFICE VISIT (OUTPATIENT)
Dept: INTERNAL MEDICINE | Facility: CLINIC | Age: 73
End: 2018-05-29
Payer: COMMERCIAL

## 2018-05-29 VITALS
OXYGEN SATURATION: 95 % | BODY MASS INDEX: 36.02 KG/M2 | HEIGHT: 69 IN | DIASTOLIC BLOOD PRESSURE: 70 MMHG | HEART RATE: 74 BPM | RESPIRATION RATE: 16 BRPM | WEIGHT: 243.2 LBS | SYSTOLIC BLOOD PRESSURE: 130 MMHG | TEMPERATURE: 97.6 F

## 2018-05-29 DIAGNOSIS — Z12.11 SPECIAL SCREENING FOR MALIGNANT NEOPLASMS, COLON: ICD-10-CM

## 2018-05-29 DIAGNOSIS — Z00.00 ROUTINE GENERAL MEDICAL EXAMINATION AT A HEALTH CARE FACILITY: Primary | ICD-10-CM

## 2018-05-29 DIAGNOSIS — E66.01 MORBID OBESITY, UNSPECIFIED OBESITY TYPE (H): ICD-10-CM

## 2018-05-29 DIAGNOSIS — E78.5 HYPERLIPIDEMIA LDL GOAL <130: ICD-10-CM

## 2018-05-29 DIAGNOSIS — I10 BENIGN ESSENTIAL HYPERTENSION: ICD-10-CM

## 2018-05-29 DIAGNOSIS — I10 ESSENTIAL HYPERTENSION: ICD-10-CM

## 2018-05-29 DIAGNOSIS — Z12.5 SPECIAL SCREENING FOR MALIGNANT NEOPLASM OF PROSTATE: ICD-10-CM

## 2018-05-29 DIAGNOSIS — R73.09 OTHER ABNORMAL GLUCOSE: ICD-10-CM

## 2018-05-29 DIAGNOSIS — E11.9 TYPE 2 DIABETES MELLITUS WITHOUT COMPLICATION, WITHOUT LONG-TERM CURRENT USE OF INSULIN (H): ICD-10-CM

## 2018-05-29 LAB
ALBUMIN SERPL-MCNC: 3.9 G/DL (ref 3.4–5)
ALP SERPL-CCNC: 107 U/L (ref 40–150)
ALT SERPL W P-5'-P-CCNC: 67 U/L (ref 0–70)
ANION GAP SERPL CALCULATED.3IONS-SCNC: 10 MMOL/L (ref 3–14)
AST SERPL W P-5'-P-CCNC: 31 U/L (ref 0–45)
BILIRUB SERPL-MCNC: 0.7 MG/DL (ref 0.2–1.3)
BUN SERPL-MCNC: 20 MG/DL (ref 7–30)
CALCIUM SERPL-MCNC: 9.3 MG/DL (ref 8.5–10.1)
CHLORIDE SERPL-SCNC: 106 MMOL/L (ref 94–109)
CHOLEST SERPL-MCNC: 118 MG/DL
CO2 SERPL-SCNC: 25 MMOL/L (ref 20–32)
CREAT SERPL-MCNC: 0.84 MG/DL (ref 0.66–1.25)
ERYTHROCYTE [DISTWIDTH] IN BLOOD BY AUTOMATED COUNT: 13.6 % (ref 10–15)
GFR SERPL CREATININE-BSD FRML MDRD: 90 ML/MIN/1.7M2
GLUCOSE SERPL-MCNC: 163 MG/DL (ref 70–99)
HBA1C MFR BLD: 6.6 % (ref 0–5.6)
HCT VFR BLD AUTO: 51.9 % (ref 40–53)
HDLC SERPL-MCNC: 28 MG/DL
HGB BLD-MCNC: 16.8 G/DL (ref 13.3–17.7)
LDLC SERPL CALC-MCNC: 70 MG/DL
MCH RBC QN AUTO: 30.9 PG (ref 26.5–33)
MCHC RBC AUTO-ENTMCNC: 32.4 G/DL (ref 31.5–36.5)
MCV RBC AUTO: 96 FL (ref 78–100)
NONHDLC SERPL-MCNC: 90 MG/DL
PLATELET # BLD AUTO: 118 10E9/L (ref 150–450)
POTASSIUM SERPL-SCNC: 4 MMOL/L (ref 3.4–5.3)
PROT SERPL-MCNC: 7.1 G/DL (ref 6.8–8.8)
PSA SERPL-ACNC: 0.64 UG/L (ref 0–4)
RBC # BLD AUTO: 5.43 10E12/L (ref 4.4–5.9)
SODIUM SERPL-SCNC: 141 MMOL/L (ref 133–144)
TRIGL SERPL-MCNC: 100 MG/DL
TSH SERPL DL<=0.005 MIU/L-ACNC: 2.16 MU/L (ref 0.4–4)
WBC # BLD AUTO: 4.4 10E9/L (ref 4–11)

## 2018-05-29 PROCEDURE — 83036 HEMOGLOBIN GLYCOSYLATED A1C: CPT | Performed by: INTERNAL MEDICINE

## 2018-05-29 PROCEDURE — 99397 PER PM REEVAL EST PAT 65+ YR: CPT | Performed by: INTERNAL MEDICINE

## 2018-05-29 PROCEDURE — 80061 LIPID PANEL: CPT | Performed by: INTERNAL MEDICINE

## 2018-05-29 PROCEDURE — 85027 COMPLETE CBC AUTOMATED: CPT | Performed by: INTERNAL MEDICINE

## 2018-05-29 PROCEDURE — 84443 ASSAY THYROID STIM HORMONE: CPT | Performed by: INTERNAL MEDICINE

## 2018-05-29 PROCEDURE — 36415 COLL VENOUS BLD VENIPUNCTURE: CPT | Performed by: INTERNAL MEDICINE

## 2018-05-29 PROCEDURE — G0103 PSA SCREENING: HCPCS | Performed by: INTERNAL MEDICINE

## 2018-05-29 PROCEDURE — 80053 COMPREHEN METABOLIC PANEL: CPT | Performed by: INTERNAL MEDICINE

## 2018-05-29 RX ORDER — HYDROCHLOROTHIAZIDE 25 MG/1
25 TABLET ORAL DAILY
Qty: 90 TABLET | Refills: 3 | Status: SHIPPED | OUTPATIENT
Start: 2018-05-29 | End: 2018-08-15

## 2018-05-29 RX ORDER — SIMVASTATIN 40 MG
40 TABLET ORAL AT BEDTIME
Qty: 90 TABLET | Refills: 3 | Status: SHIPPED | OUTPATIENT
Start: 2018-05-29 | End: 2019-06-11

## 2018-05-29 RX ORDER — ATENOLOL 50 MG/1
50 TABLET ORAL DAILY
Qty: 90 TABLET | Refills: 3 | Status: SHIPPED | OUTPATIENT
Start: 2018-05-29 | End: 2019-06-11

## 2018-05-29 NOTE — PROGRESS NOTES
SUBJECTIVE:   Connor Srinivasan is a 73 year old male who presents for Preventive Visit.  Answers for HPI/ROS submitted by the patient on 5/28/2018   Annual Exam:  Getting at least 3 servings of Calcium per day:: NO  Bi-annual eye exam:: Yes  Dental care twice a year:: Yes  Sleep apnea or symptoms of sleep apnea:: None  Diet:: Regular (no restrictions)  Frequency of exercise:: None  Taking medications regularly:: Yes  Medication side effects:: None  Additional concerns today:: No  Activities of Daily Living: no assistance needed  Home safety: no safety concerns identified  Hearing Impairment:: difficulty following a conversation in a noisy restaurant or crowded room  PHQ-2 Score: 0    Are you in the first 12 months of your Medicare Part B coverage?  No    Fall risk:  Fallen 2 or more times in the past year?: No  Any fall with injury in the past year?: No        COGNITIVE SCREEN  1) Repeat 3 items (Banana, Sunrise, Chair)    2) Clock draw: NORMAL  3) 3 item recall: Recalls 3 objects  Results: 3 items recalled: COGNITIVE IMPAIRMENT LESS LIKELY    Mini-CogTM Copyright S Yadi. Licensed by the author for use in [facility]; reprinted with permission (zoey@[facility]). All rights reserved.      Exercise  Patient is very active while working at [employer]. He has a goal of 11,000 steps per day. Patient tracks steps using his phone. He often far exceeds his daily goal. He shows number of steps for a few days, ranging from 11,000 - 17,000. Patient believes weight is very similar to previous.    Wt Readings from Last 4 Encounters:   05/29/18 110.3 kg (243 lb 3.2 oz)   02/22/18 109.3 kg (241 lb)   06/28/17 110.1 kg (242 lb 12.8 oz)   06/07/17 110.1 kg (242 lb 12.8 oz)      Low back pain  Patient has continued right low back pain. He believes this may be due to a fall that occurred last year in which he tripped on the curb outside. Patient did not go to the hospital after the fall. He had constant pain of the  area for 5 months and was not able to sleep in his bed. He thought he may have pulled a muscle or bruised a rib. Now he will feel occasional pain and stiffness of the area. No radiation to legs. Denies leg weakness or numbness    Skin concern  Patient had a skin concern on the lower left extremity. He saw Dr. Cid on 02/22/2018. Patient wears compression stocking regularly for varicose veins. He reports the area healed well.     Past/recent records reviewed and discussed for:  - Bilateral cataract surgery 06/2017  - Colonoscopy 04/03/2006, normal. Q 10 years.    Reviewed and updated as needed this visit by clinical staff  Tobacco  Allergies  Med Hx  Surg Hx  Fam Hx  Soc Hx        Reviewed and updated as needed this visit by Provider        Social History   Substance Use Topics     Smoking status: Never Smoker     Smokeless tobacco: Never Used     Alcohol use Yes      Comment: rarely       If you drink alcohol do you typically have >3 drinks per day or >7 drinks per week? Yes - AUDIT SCORE:     No flowsheet data found.                        Today's PHQ-2 Score:   PHQ-2 ( 1999 [employer]) 5/28/2018 5/26/2017   Q1: Little interest or pleasure in doing things 0 0   Q2: Feeling down, depressed or hopeless 0 0   PHQ-2 Score 0 0   Q1: Little interest or pleasure in doing things Not at all Not at all   Q2: Feeling down, depressed or hopeless Not at all Not at all   PHQ-2 Score 0 0       Do you feel safe in your environment - Yes    Do you have a Health Care Directive?: No: Advance care planning was reviewed with patient; patient declined at this time.    Current providers sharing in care for this patient include:   Patient Care Team:  Roverto Edwards MD as PCP - General    The following health maintenance items are reviewed in Epic and correct as of today:  Health Maintenance   Topic Date Due     HEPATITIS C SCREENING  03/20/1963     AORTIC ANEURYSM SCREENING (SYSTEM ASSIGNED)  03/20/2010     FALL RISK ASSESSMENT   "05/26/2018     PNEUMOCOCCAL (2 of 2 - PPSV23) 05/26/2018     INFLUENZA VACCINE (Season Ended) 09/01/2018     LIPID SCREEN Q5 YR MALE (SYSTEM ASSIGNED)  05/26/2022     ADVANCE DIRECTIVE PLANNING Q5 YRS  05/26/2022     COLON CANCER SCREEN (SYSTEM ASSIGNED)  04/21/2026     TETANUS Q10 YR  04/21/2026         ROS:  CONSTITUTIONAL: NEGATIVE for fever, chills, change in weight  INTEGUMENTARY/SKIN: NEGATIVE for worrisome rashes, moles or lesions  EYES: NEGATIVE for vision changes or irritation  ENT/MOUTH: POSITIVE for bilateral hearing aids, frequent epistaxis  RESP: NEGATIVE for significant cough or SOB  CV: NEGATIVE for chest pain, palpitations or peripheral edema  GI: NEGATIVE for nausea, abdominal pain, heartburn, or change in bowel habits  : NEGATIVE for frequency, dysuria, or hematuria  MUSCULOSKELETAL: POSITIVE for lower back pain  NEURO: NEGATIVE for weakness, dizziness or paresthesias  ENDOCRINE: NEGATIVE for temperature intolerance, skin/hair changes  HEME: NEGATIVE for bleeding problems  PSYCHIATRIC: NEGATIVE for changes in mood or affect    This document serves as a record of the services and decisions personally performed and made by Roverto Edwards MD. It was created on his behalf by Crissy White, a trained medical scribe. The creation of this document is based on the provider's statements to the medical scribe.  Crissy White May 29, 2018 8:30 AM      OBJECTIVE:   /70 (BP Location: Left arm, Patient Position: Sitting, Cuff Size: Adult Large)  Pulse 74  Temp 97.6  F (36.4  C) (Oral)  Resp 16  Ht 5' 9\" (1.753 m)  Wt 243 lb 3.2 oz (110.3 kg)  SpO2 95%  BMI 35.91 kg/m2 Estimated body mass index is 35.91 kg/(m^2) as calculated from the following:    Height as of this encounter: 5' 9\" (1.753 m).    Weight as of this encounter: 243 lb 3.2 oz (110.3 kg).  EXAM:   GENERAL: healthy, alert and no distress  EYES: Eyes grossly normal to inspection, PERRL and conjunctivae and sclerae normal  HENT: ear " canals and TM's normal, nose and mouth without ulcers or lesions  NECK: no adenopathy, no asymmetry, masses, or scars and thyroid normal to palpation  RESP: lungs clear to auscultation - no rales, rhonchi or wheezes  CV: regular rate and rhythm, normal S1 S2, no S3 or S4, no murmur, click or rub, no peripheral edema and peripheral pulses strong  ABDOMEN: soft, nontender, no hepatosplenomegaly, no masses and bowel sounds normal   (male): normal male genitalia without lesions or urethral discharge, no hernia  RECTAL: normal sphincter tone, no rectal masses, prostate normal size, smooth, nontender without nodules or masses  MS: no gross musculoskeletal defects noted, no edema  SKIN: no suspicious lesions or rashes, varicose veins on lower extremities  NEURO: Normal strength and tone, mentation intact and speech normal  PSYCH: mentation appears normal, affect normal/bright  LYMPH: no cervical, supraclavicular, axillary, or inguinal adenopathy    ASSESSMENT / PLAN:   (Z00.00) Routine general medical examination at a health care facility  (primary encounter diagnosis)  Comment: Stable health. See epic orders.   Plan: Comprehensive metabolic panel, Lipid panel         reflex to direct LDL Fasting, TSH with free T4         reflex, CBC with platelets        Follow up yearly.     (R73.09) Other abnormal glucose  Comment: Diabetes screening  Plan: Hemoglobin A1c          (E66.01) Obesity, BMI>35 with comorbidity  Comment: Encouraged patient to continue walking and recommended watching portion sizes.     (I10) Essential hypertension  (I10) Benign essential hypertension  Comment: BP at target. Continue current meds.   Plan: hydrochlorothiazide (HYDRODIURIL) 25 MG tablet,        atenolol (TENORMIN) 50 MG tablet          (E78.5) Hyperlipidemia LDL goal <130  Comment: Update lipids. Continue current meds.   Plan: Comprehensive metabolic panel, Lipid panel         reflex to direct LDL Fasting, simvastatin         (ZOCOR) 40 MG  "tablet          (Z12.5) Special screening for malignant neoplasm of prostate  Plan: Prostate spec antigen screen          (Z12.11) Special screening for malignant neoplasms, colon  Comment: Last colonoscopy 04/03/2006, normal. Referral provided.   Plan: GASTROENTEROLOGY ADULT REF PROCEDURE ONLY         Kurt Nevarez [phone]; No Provider         Preference            End of Life Planning:  Patient currently has an advanced directive: No.  I have verified the patient's ablity to prepare an advanced directive/make health care decisions.  Literature was provided to assist patient in preparing an advanced directive.    COUNSELING:  Reviewed preventive health counseling, as reflected in patient instructions  Special attention given to:       Regular exercise       Healthy diet/nutrition       Colon cancer screening       Prostate cancer screening        Estimated body mass index is 35.91 kg/(m^2) as calculated from the following:    Height as of this encounter: 5' 9\" (1.753 m).    Weight as of this encounter: 243 lb 3.2 oz (110.3 kg).  Weight management plan: Discussed healthy diet and exercise guidelines and patient will follow up in 12 months in clinic to re-evaluate.     reports that he has never smoked. He has never used smokeless tobacco.      Appropriate preventive services were discussed with this patient, including applicable screening as appropriate for cardiovascular disease, diabetes, osteopenia/osteoporosis, and glaucoma.  As appropriate for age/gender, discussed screening for colorectal cancer, prostate cancer, breast cancer, and cervical cancer. Checklist reviewing preventive services available has been given to the patient.    Reviewed patients plan of care and provided an AVS. The Basic Care Plan (routine screening as documented in Health Maintenance) for Connor meets the Care Plan requirement. This Care Plan has been established and reviewed with the Patient.    Counseling Resources:  ATP IV " Guidelines  Pooled Cohorts Equation Calculator  Breast Cancer Risk Calculator  FRAX Risk Assessment  ICSI Preventive Guidelines  Dietary Guidelines for Americans, 2010  USDA's MyPlate  ASA Prophylaxis  Lung CA Screening    The information in this document, created by the medical scribe for me, accurately reflects the services I personally performed and the decisions made by me. I have reviewed and approved this document for accuracy prior to leaving the patient care area.  May 29, 2018 8:29 AM    Roverto Edwards MD  [facility]

## 2018-05-29 NOTE — PATIENT INSTRUCTIONS
Preventive Health Recommendations:       Male Ages 65 and over    Yearly exam:             See your health care provider every year in order to  o   Review health changes.   o   Discuss preventive care.    o   Review your medicines if your doctor has prescribed any.    Talk with your health care provider about whether you should have a test to screen for prostate cancer (PSA).    Every 3 years, have a diabetes test (fasting glucose). If you are at risk for diabetes, you should have this test more often.    Every 5 years, have a cholesterol test. Have this test more often if you are at risk for high cholesterol or heart disease.     Every 10 years, have a colonoscopy. Or, have a yearly FIT test (stool test). These exams will check for colon cancer.    Talk to with your health care provider about screening for Abdominal Aortic Aneurysm if you have a family history of AAA or have a history of smoking.  Shots:     Get a flu shot each year.     Get a tetanus shot every 10 years.     Talk to your doctor about your pneumonia vaccines. There are now two you should receive - Pneumovax (PPSV 23) and Prevnar (PCV 13).    Talk to your doctor about a shingles vaccine.     Talk to your doctor about the hepatitis B vaccine.  Nutrition:     Eat at least 5 servings of fruits and vegetables each day.     Eat whole-grain bread, whole-wheat pasta and brown rice instead of white grains and rice.     Talk to your doctor about Calcium and Vitamin D.   Lifestyle    Exercise for at least 150 minutes a week (30 minutes a day, 5 days a week). This will help you control your weight and prevent disease.     Limit alcohol to one drink per day.     No smoking.     Wear sunscreen to prevent skin cancer.     See your dentist every six months for an exam and cleaning.     See your eye doctor every 1 to 2 years to screen for conditions such as glaucoma, macular degeneration and cataracts.    You received the first of two Pneumococcal pneumonia  "vaccinations (Prevnar -13) in May of 2017. You would now be due for the second one, called \"Pneumovax-23\". This is usually covered by insurance, you could check with them. It is also available at [facility] and many pharmacies.   Let me know if you have any other episodes of transient weakness. Continue aspirin, and current BP and cholesterol meds.   Will recommend diabetes education if blood tests do show you in diabetes range today.   Someone will contact you for a colonoscopy.     Otherwise everything looks fine!    Refills of medications have been faxed to your pharmacy.     I'll get back to you with lab results soon, especially if there is anything of concern.      See you in a year, sooner if problems.    "

## 2018-05-29 NOTE — MR AVS SNAPSHOT
After Visit Summary   5/29/2018    Connor Srinivasan    MRN: [id number]           Patient Information     Date Of Birth          1945        Visit Information        Provider Department      5/29/2018 8:20 AM Roverto Edwards MD [facility]        Today's Diagnoses     Routine general medical examination at a health care facility    -  1    Other abnormal glucose        Obesity, BMI>35 with comorbidity        Essential hypertension        Hyperlipidemia LDL goal <130        Special screening for malignant neoplasm of prostate        Benign essential hypertension        Special screening for malignant neoplasms, colon          Care Instructions      Preventive Health Recommendations:       Male Ages 65 and over    Yearly exam:             See your health care provider every year in order to  o   Review health changes.   o   Discuss preventive care.    o   Review your medicines if your doctor has prescribed any.    Talk with your health care provider about whether you should have a test to screen for prostate cancer (PSA).    Every 3 years, have a diabetes test (fasting glucose). If you are at risk for diabetes, you should have this test more often.    Every 5 years, have a cholesterol test. Have this test more often if you are at risk for high cholesterol or heart disease.     Every 10 years, have a colonoscopy. Or, have a yearly FIT test (stool test). These exams will check for colon cancer.    Talk to with your health care provider about screening for Abdominal Aortic Aneurysm if you have a family history of AAA or have a history of smoking.  Shots:     Get a flu shot each year.     Get a tetanus shot every 10 years.     Talk to your doctor about your pneumonia vaccines. There are now two you should receive - Pneumovax (PPSV 23) and Prevnar (PCV 13).    Talk to your doctor about a shingles vaccine.     Talk to your doctor about the hepatitis B vaccine.  Nutrition:     Eat at  "least 5 servings of fruits and vegetables each day.     Eat whole-grain bread, whole-wheat pasta and brown rice instead of white grains and rice.     Talk to your doctor about Calcium and Vitamin D.   Lifestyle    Exercise for at least 150 minutes a week (30 minutes a day, 5 days a week). This will help you control your weight and prevent disease.     Limit alcohol to one drink per day.     No smoking.     Wear sunscreen to prevent skin cancer.     See your dentist every six months for an exam and cleaning.     See your eye doctor every 1 to 2 years to screen for conditions such as glaucoma, macular degeneration and cataracts.    You received the first of two Pneumococcal pneumonia vaccinations (Prevnar -13) in May of 2017. You would now be due for the second one, called \"Pneumovax-23\". This is usually covered by insurance, you could check with them. It is also available at [facility] and many pharmacies.   Let me know if you have any other episodes of transient weakness. Continue aspirin, and current BP and cholesterol meds.   Will recommend diabetes education if blood tests do show you in diabetes range today.   Someone will contact you for a colonoscopy.     Otherwise everything looks fine!    Refills of medications have been faxed to your pharmacy.     I'll get back to you with lab results soon, especially if there is anything of concern.      See you in a year, sooner if problems.            Follow-ups after your visit        Additional Services     GASTROENTEROLOGY ADULT REF PROCEDURE ONLY Aramiss Roque [phone]; No Provider Preference       Last Lab Result: Creatinine (mg/dL)       Date                     Value                 05/26/2017               0.73             ----------  Body mass index is 35.91 kg/(m^2).      Patient will be contacted to schedule procedure.     Please be aware that coverage of these services is subject to the terms and limitations of your health insurance plan.  Call member " services at your health plan with any benefit or coverage questions.  Any procedures must be performed at a [facility] facility OR coordinated by your clinic's referral office.    Please bring the following with you to your appointment:    (1) Any X-Rays, CTs or MRIs which have been performed.  Contact the facility where they were done to arrange for  prior to your scheduled appointment.    (2) List of current medications   (3) This referral request   (4) Any documents/labs given to you for this referral                  Who to contact     If you have questions or need follow up information about today's clinic visit or your schedule please contact [facility] directly at [phone].  Normal or non-critical lab and imaging results will be communicated to you by MyChart, letter or phone within 4 business days after the clinic has received the results. If you do not hear from us within 7 days, please contact the clinic through [employer] or phone. If you have a critical or abnormal lab result, we will notify you by phone as soon as possible.  Submit refill requests through [employer] or call your pharmacy and they will forward the refill request to us. Please allow 3 business days for your refill to be completed.          Additional Information About Your Visit        [employer] Information     [employer] gives you secure access to your electronic health record. If you see a primary care provider, you can also send messages to your care team and make appointments. If you have questions, please call your primary care clinic.  If you do not have a primary care provider, please call [phone] and they will assist you.        Care EveryWhere ID     This is your Care EveryWhere ID. This could be used by other organizations to access your [facility] medical records  [id number]        Your Vitals Were     Pulse Temperature Respirations Height Pulse Oximetry BMI (Body Mass Index)    74 97.6  F (36.4  C) (Oral)  "16 5' 9\" (1.753 m) 95% 35.91 kg/m2       Blood Pressure from Last 3 Encounters:   05/29/18 130/70   02/22/18 130/60   06/28/17 125/69    Weight from Last 3 Encounters:   05/29/18 243 lb 3.2 oz (110.3 kg)   02/22/18 241 lb (109.3 kg)   06/28/17 242 lb 12.8 oz (110.1 kg)              We Performed the Following     CBC with platelets     Comprehensive metabolic panel     GASTROENTEROLOGY ADULT REF PROCEDURE ONLY Kurt Nevarez [phone]; No Provider Preference     Hemoglobin A1c     Lipid panel reflex to direct LDL Fasting     Prostate spec antigen screen     TSH with free T4 reflex          Today's Medication Changes          These changes are accurate as of 5/29/18  8:50 AM.  If you have any questions, ask your nurse or doctor.               Start taking these medicines.        Dose/Directions    atenolol 50 MG tablet   Commonly known as:  TENORMIN   Used for:  Benign essential hypertension   Started by:  Roverto Edwards MD        Dose:  50 mg   Take 1 tablet (50 mg) by mouth daily   Quantity:  90 tablet   Refills:  3         Stop taking these medicines if you haven't already. Please contact your care team if you have questions.     metoprolol succinate 100 MG 24 hr tablet   Commonly known as:  TOPROL-XL   Stopped by:  Roverto Edwards MD                Where to get your medicines      These medications were sent to [id number] [place], [place] - [address] [address]  [address], [facility] [address]     Phone:  [phone]     atenolol 50 MG tablet    hydrochlorothiazide 25 MG tablet    simvastatin 40 MG tablet                Primary Care Provider Office Phone # Fax #    Roverto Edwards [phone]       [address] [facility] [address]  [facility] [address]        Equal Access to Services     MONO SANTAMARIA AH: Hadjuan arana Solisa, waaxda luqadaha, qaybta kaalmada adequintenyada, suzy rasmussen. So [facility] [phone].    ATENCIÓN: Si rena carrasco " alvarez disposición servicios gratuitos de asistencia lingüística. Loco dai [phone].    We comply with applicable federal civil rights laws and [place] laws. We do not discriminate on the basis of race, color, national origin, age, disability, sex, sexual orientation, or gender identity.            Thank you!     Thank you for choosing [facility]  for your care. Our goal is always to provide you with excellent care. Hearing back from our patients is one way we can continue to improve our services. Please take a few minutes to complete the written survey that you may receive in the mail after your visit with us. Thank you!             Your Updated Medication List - Protect others around you: Learn how to safely use, store and throw away your medicines at www.disposemymeds.org.          This list is accurate as of 5/29/18  8:50 AM.  Always use your most recent med list.                   Brand Name Dispense Instructions for use Diagnosis    aspirin 81 MG tablet     100    ONE DAILY    Routine general medical examination at a health care facility, Other and unspecified hyperlipidemia       atenolol 50 MG tablet    TENORMIN    90 tablet    Take 1 tablet (50 mg) by mouth daily    Benign essential hypertension       fluticasone 50 MCG/ACT spray    FLONASE    48 g    Spray 2 sprays into both nostrils daily    Chronic rhinitis       hydrochlorothiazide 25 MG tablet    HYDRODIURIL    90 tablet    Take 1 tablet (25 mg) by mouth daily    Benign essential hypertension       simvastatin 40 MG tablet    ZOCOR    90 tablet    Take 1 tablet (40 mg) by mouth At Bedtime    Hyperlipidemia LDL goal <130       tadalafil 20 MG tablet    CIALIS    16 tablet    Take one tablet by mouth. TAKE AT LEAST 30 MINUTES BEFORE INTERCOURSE,    Impotence of organic origin

## 2018-06-10 PROBLEM — E11.9 TYPE 2 DIABETES MELLITUS WITHOUT COMPLICATION, WITHOUT LONG-TERM CURRENT USE OF INSULIN (H): Status: ACTIVE | Noted: 2018-06-10

## 2018-06-26 ENCOUNTER — ALLIED HEALTH/NURSE VISIT (OUTPATIENT)
Dept: EDUCATION SERVICES | Facility: CLINIC | Age: 73
End: 2018-06-26
Payer: COMMERCIAL

## 2018-06-26 DIAGNOSIS — E11.9 TYPE 2 DIABETES MELLITUS WITHOUT COMPLICATION, WITHOUT LONG-TERM CURRENT USE OF INSULIN (H): Primary | ICD-10-CM

## 2018-06-26 PROCEDURE — G0108 DIAB MANAGE TRN  PER INDIV: HCPCS

## 2018-06-26 NOTE — PATIENT INSTRUCTIONS
Care Plan:  Meal Plan Recommendation: eat 3 meals a day, have small snacks between meals, if needed, use portion control and use plate planning method  Exercise / activity plan: continue with your activity  Check blood sugars before meals, 2 hours after the start of meals (rotate between breakfast, lunch and supper)    Follow up:  Follow-up diabetes education appointment 1 month     Bring blood glucose meter and logbook with you to all doctor and follow-up appointments.     [place] [facility] for the [facility]:  For Your Diabetes or Nutrition Education Appointments Call:  [phone]   For Diabetes or Nutrition Related Questions Call or Email:   [phone]  [email or web address]  Fax: [phone]   If you need a medication refill please contact your pharmacy. Please allow 3 business days for your refills to be completed.     Instructions for emailing the Diabetes Educators    If you need to communicate a non-urgent message to a Diabetes Educator via email, please send to [email or web address].    Please follow the following email guidelines:    Subject line: Secure: your clinic name (example: Secure: Sarah)  In the email please include: First name, middle initial, last name and date of birth.    We will be in touch with you within one (1) business day.

## 2018-06-26 NOTE — PROGRESS NOTES
Diabetes Self Management Training: Initial Assessment Visit for Newly Diagnosed Patients (Complete AADE Goals Flowsheet)    Connor Srinivasan presents today for education related to Type 2 diabetes.    He is accompanied by self    Patient's diabetes management related comments/concerns: wants to learn more about diabetes and how to manage it.     Patient's emotional response to diabetes: expresses readiness to learn    Patient would like this visit to be focused around the following diabetes-related behaviors and goals: Diabetes pathophysiology and Assistance with making lifestyle changes    ASSESSMENT:  Patient Problem List and Family Medical History reviewed for relevant medical history, current medical status, and diabetes risk factors.    Current Diabetes Management per Patient:  Taking diabetes medications? no        Do you have any difficulty affording your medications or glucose monitoring supplies?     No     *Abbreviated insulin dose documentation key: Insulin trade name (puvgggyeg-fzkuo-wgwpwu-bedtime) - i.e. Humalog 5-5-5-0 (Humalog 5 units at breakfast, 5 units at lunch, and 5 units at dinner).     Patient glucose self monitoring as follows: never.     Patient's most recent   Lab Results   Component Value Date    A1C 6.6 05/29/2018    is meeting goal of <7.0    Nutrition:  Patient eats 3 meals per day, eats out more than once a week and has a high intake of carbohydrates    Breakfast - 2 sausage burritos, hashbrowns, oatmeal raisin cookie  Or maple roll Or cranapple juice  Lunch - 1 brat, bun, cheeseburger with bun, potato salad, lemon bite cookie, 4 oreos Or arden chin- shrimp, vegetables, fried rice, 2 egg rolls, diet soda, vanilla yogurt, doritos Or frozen vanilla yogurt, diet pepsi, regular soda,    Dinner - 4 pizza, 2 poptarts Or 4 pizza, alvaro filled chocolate Or 2 stuffed peppers, 2 ears corn on the cob   Snacks - popcorn with butter    Beverages: Water, diet soda, coffee,  "juice    Cultural/Alevism diet restrictions: No     Biggest Challenge to Healthy Eating: portion control    Physical Activity:    Limitations: none  Works at [employer] walks a lot- goal 11,000 steps a day, achieves at least 10,000- 5 times a weeks    Diabetes Risk Factors:  age over 45 years, hypertension, hyperlipidemia and overweight/obesity    Relevant co-morbidities and related health problems:  Significant for:  atherosclerotic cardiovascular disease    Diabetes Complications:  Not discussed today.    Recent health service and resource utilization related to diabetes (hyperglycemia, hypoglycemia, etc):   None    Vitals:  There were no vitals taken for this visit.  Estimated body mass index is 35.91 kg/(m^2) as calculated from the following:    Height as of 5/29/18: 1.753 m (5' 9\").    Weight as of 5/29/18: 110.3 kg (243 lb 3.2 oz).   Last 3 BP:   BP Readings from Last 3 Encounters:   05/29/18 130/70   02/22/18 130/60   06/28/17 125/69       History   Smoking Status     Never Smoker   Smokeless Tobacco     Never Used       Labs:  Lab Results   Component Value Date    A1C 6.6 05/29/2018     Lab Results   Component Value Date     05/29/2018     Lab Results   Component Value Date    LDL 70 05/29/2018     HDL Cholesterol   Date Value Ref Range Status   05/29/2018 28 (L) >39 mg/dL Final   ]  GFR Estimate   Date Value Ref Range Status   05/29/2018 90 >60 mL/min/1.7m2 Final     Comment:     Non  GFR Calc     GFR Estimate If Black   Date Value Ref Range Status   05/29/2018 >90 >60 mL/min/1.7m2 Final     Comment:      GFR Calc     Lab Results   Component Value Date    CR 0.84 05/29/2018     No results found for: MICROALBUMIN    Socio/Economic/Cultural Considerations:    Support system: spouse    Cultural Influences/Ethnic Background:  American      Health Literacy/Numeracy:  \"With diabetes, it's helpful to use forms and log books to write down blood sugars and what you're eating at " times to help understand how foods affect your blood sugars. With this in mind how confident are you at filling out medical forms, such as these, by yourself?  Extremely    Health Beliefs and Attitudes:   Patient Activation Measure Survey Score:  RISHABH Score (Last Two) 4/29/2011   RISHABH Raw Score 52   Activation Score 100   RISHABH Level 4       Stage of Change: PREPARATION (Decided to change - considering how)      Diabetes knowledge and skills assessment:     Patient is knowledgeable in diabetes management concepts related to: Being Active    Patient needs further education on the following diabetes management concepts: Healthy Eating, Being Active, Monitoring, Taking Medication, Problem Solving, Reducing Risks and Healthy Coping    Barriers to Learning Assessment: No Barriers identified    Based on learning assessment above, most appropriate setting for further diabetes education would be: Group class or Individual setting.    INTERVENTION:   Education provided today on:  AADE Self-Care Behaviors:  Healthy Eating: consistency in amount, composition, and timing of food intake, weight reduction, portion control, plate planning method and label reading  Being Active: relationship to blood glucose  Monitoring: purpose, proper technique, log and interpret results, individual blood glucose targets, frequency of monitoring and proper sharps disposal  Problem Solving: high blood glucose - causes, signs/symptoms, treatment and prevention and low blood glucose - causes, signs/symptoms, treatment and prevention  Reducing Risks: A1C - goals, relating to blood glucose levels, how often to check  Patient was instructed on One Touch Verio Flex meter and was able to provide an accurate return demonstration. Patient's blood glucose reading today was 169 mg/dL.    Opportunities for ongoing education and support in diabetes-self management were discussed.    Pt verbalized understanding of concepts discussed and recommendations provided  today.       Education Materials Provided:  [place] Understanding Diabetes Booklet, Safe Disposal Options for Needles & Syringes, BG Log Sheet, My Plate Planner, One Touch Verio Flex meter kit and healthy meal and snack ideas    PLAN:  See Patient Instructions for co-developed, patient-stated behavior change goals.  AVS printed and provided to patient today.    FOLLOW-UP:  Chart routed to referring provider.    Ongoing plan for education and support: Follow-up visit with diabetes educator in 4-6 weeks    Annmarie Orourke RN,CDE   Time Spent: 60 minutes  Encounter Type: Individual    Any diabetes medication dose changes were made via the CDE Protocol and Collaborative Practice Agreement with the patient's referring provider. A copy of this encounter was shared with the provider.

## 2018-06-26 NOTE — LETTER
6/26/2018         RE: Connor Srinivasan  [address]  [facility] [address]        Dear Colleague,    Thank you for referring your patient, Connor Srinivasan, to the [facility]. Please see a copy of my visit note below.    Diabetes Self Management Training: Initial Assessment Visit for Newly Diagnosed Patients (Complete AADE Goals Flowsheet)    Connor Srinivasan presents today for education related to Type 2 diabetes.    He is accompanied by self    Patient's diabetes management related comments/concerns: wants to learn more about diabetes and how to manage it.     Patient's emotional response to diabetes: expresses readiness to learn    Patient would like this visit to be focused around the following diabetes-related behaviors and goals: Diabetes pathophysiology and Assistance with making lifestyle changes    ASSESSMENT:  Patient Problem List and Family Medical History reviewed for relevant medical history, current medical status, and diabetes risk factors.    Current Diabetes Management per Patient:  Taking diabetes medications? no        Do you have any difficulty affording your medications or glucose monitoring supplies?     No     *Abbreviated insulin dose documentation key: Insulin trade name (pelbcdaio-tachc-dqffpn-bedtime) - i.e. Humalog 5-5-5-0 (Humalog 5 units at breakfast, 5 units at lunch, and 5 units at dinner).     Patient glucose self monitoring as follows: never.     Patient's most recent   Lab Results   Component Value Date    A1C 6.6 05/29/2018    is meeting goal of <7.0    Nutrition:  Patient eats 3 meals per day, eats out more than once a week and has a high intake of carbohydrates    Breakfast - 2 sausage burritos, hashbrowns, oatmeal raisin cookie  Or maple roll Or cranapple juice  Lunch - 1 brat, bun, cheeseburger with bun, potato salad, lemon bite cookie, 4 oreos Or arden chin- shrimp, vegetables, fried rice, 2 egg rolls, diet  "soda, vanilla yogurt, doritos Or frozen vanilla yogurt, diet pepsi, regular soda,    Dinner - 4 pizza, 2 poptarts Or 4 pizza, alvaro filled chocolate Or 2 stuffed peppers, 2 ears corn on the cob   Snacks - popcorn with butter    Beverages: Water, diet soda, coffee, juice    Cultural/Anglican diet restrictions: No     Biggest Challenge to Healthy Eating: portion control    Physical Activity:    Limitations: none  Works at [employer]- walks a lot- goal 11,000 steps a day, achieves at least 10,000- 5 times a weeks    Diabetes Risk Factors:  age over 45 years, hypertension, hyperlipidemia and overweight/obesity    Relevant co-morbidities and related health problems:  Significant for:  atherosclerotic cardiovascular disease    Diabetes Complications:  Not discussed today.    Recent health service and resource utilization related to diabetes (hyperglycemia, hypoglycemia, etc):   None    Vitals:  There were no vitals taken for this visit.  Estimated body mass index is 35.91 kg/(m^2) as calculated from the following:    Height as of 5/29/18: 1.753 m (5' 9\").    Weight as of 5/29/18: 110.3 kg (243 lb 3.2 oz).   Last 3 BP:   BP Readings from Last 3 Encounters:   05/29/18 130/70   02/22/18 130/60   06/28/17 125/69       History   Smoking Status     Never Smoker   Smokeless Tobacco     Never Used       Labs:  Lab Results   Component Value Date    A1C 6.6 05/29/2018     Lab Results   Component Value Date     05/29/2018     Lab Results   Component Value Date    LDL 70 05/29/2018     HDL Cholesterol   Date Value Ref Range Status   05/29/2018 28 (L) >39 mg/dL Final   ]  GFR Estimate   Date Value Ref Range Status   05/29/2018 90 >60 mL/min/1.7m2 Final     Comment:     Non  GFR Calc     GFR Estimate If Black   Date Value Ref Range Status   05/29/2018 >90 >60 mL/min/1.7m2 Final     Comment:      GFR Calc     Lab Results   Component Value Date    CR 0.84 05/29/2018     No results found for: " "MICROALBUMIN    Socio/Economic/Cultural Considerations:    Support system: spouse    Cultural Influences/Ethnic Background:  American      Health Literacy/Numeracy:  \"With diabetes, it's helpful to use forms and log books to write down blood sugars and what you're eating at times to help understand how foods affect your blood sugars. With this in mind how confident are you at filling out medical forms, such as these, by yourself?  Extremely    Health Beliefs and Attitudes:   Patient Activation Measure Survey Score:  RISHABH Score (Last Two) 4/29/2011   RISHABH Raw Score 52   Activation Score 100   RISHABH Level 4       Stage of Change: PREPARATION (Decided to change - considering how)      Diabetes knowledge and skills assessment:     Patient is knowledgeable in diabetes management concepts related to: Being Active    Patient needs further education on the following diabetes management concepts: Healthy Eating, Being Active, Monitoring, Taking Medication, Problem Solving, Reducing Risks and Healthy Coping    Barriers to Learning Assessment: No Barriers identified    Based on learning assessment above, most appropriate setting for further diabetes education would be: Group class or Individual setting.    INTERVENTION:   Education provided today on:  AADE Self-Care Behaviors:  Healthy Eating: consistency in amount, composition, and timing of food intake, weight reduction, portion control, plate planning method and label reading  Being Active: relationship to blood glucose  Monitoring: purpose, proper technique, log and interpret results, individual blood glucose targets, frequency of monitoring and proper sharps disposal  Problem Solving: high blood glucose - causes, signs/symptoms, treatment and prevention and low blood glucose - causes, signs/symptoms, treatment and prevention  Reducing Risks: A1C - goals, relating to blood glucose levels, how often to check  Patient was instructed on One Touch Verio Flex meter and was able to " provide an accurate return demonstration. Patient's blood glucose reading today was 169 mg/dL.    Opportunities for ongoing education and support in diabetes-self management were discussed.    Pt verbalized understanding of concepts discussed and recommendations provided today.       Education Materials Provided:  [place] Understanding Diabetes Booklet, Safe Disposal Options for Needles & Syringes, BG Log Sheet, My Plate Planner, One Touch Verio Flex meter kit and healthy meal and snack ideas    PLAN:  See Patient Instructions for co-developed, patient-stated behavior change goals.  AVS printed and provided to patient today.    FOLLOW-UP:  Chart routed to referring provider.    Ongoing plan for education and support: Follow-up visit with diabetes educator in 4-6 weeks    Annmarie Orourke RN,CDE   Time Spent: 60 minutes  Encounter Type: Individual    Any diabetes medication dose changes were made via the CDE Protocol and Collaborative Practice Agreement with the patient's referring provider. A copy of this encounter was shared with the provider.

## 2018-06-26 NOTE — MR AVS SNAPSHOT
After Visit Summary   6/26/2018    Connor Srinivasan    MRN: [id number]           Patient Information     Date Of Birth          1945        Visit Information        Provider Department      6/26/2018 8:30 AM Annmarie Orourke RN [facility]        Today's Diagnoses     Type 2 diabetes mellitus without complication, without long-term current use of insulin (H)    -  1      Care Instructions      Care Plan:  Meal Plan Recommendation: eat 3 meals a day, have small snacks between meals, if needed, use portion control and use plate planning method  Exercise / activity plan: continue with your activity  Check blood sugars before meals, 2 hours after the start of meals (rotate between breakfast, lunch and supper)    Follow up:  Follow-up diabetes education appointment 1 month     Bring blood glucose meter and logbook with you to all doctor and follow-up appointments.     [facility]:  For Your Diabetes or Nutrition Education Appointments Call:  [phone]   For Diabetes or Nutrition Related Questions Call or Email:   [phone]  [email or web address]  Fax: [phone]   If you need a medication refill please contact your pharmacy. Please allow 3 business days for your refills to be completed.     Instructions for emailing the Diabetes Educators    If you need to communicate a non-urgent message to a Diabetes Educator via email, please send to [email or web address].    Please follow the following email guidelines:    Subject line: Secure: your clinic name (example: Secure: Sarah)  In the email please include: First name, middle initial, last name and date of birth.    We will be in touch with you within one (1) business day.            Follow-ups after your visit        Your next 10 appointments already scheduled     Jun 28, 2018   Procedure with Benigno Landaverde MD   [facility]  ([facility])    [address]  [facility] [id number] [phone]           [facility] is located at [address]              Who to contact     If you have questions or need follow up information about today's clinic visit or your schedule please contact [facility] directly at [phone].  Normal or non-critical lab and imaging results will be communicated to you by [employer], letter or phone within 4 business days after the clinic has received the results. If you do not hear from us within 7 days, please contact the clinic through [employer] or phone. If you have a critical or abnormal lab result, we will notify you by phone as soon as possible.  Submit refill requests through [employer] or call your pharmacy and they will forward the refill request to us. Please allow 3 business days for your refill to be completed.          Additional Information About Your Visit        [employer] Information     [employer] gives you secure access to your electronic health record. If you see a primary care provider, you can also send messages to your care team and make appointments. If you have questions, please call your primary care clinic.  If you do not have a primary care provider, please call [phone] and they will assist you.        Care EveryWhere ID     This is your Care EveryWhere ID. This could be used by other organizations to access your [place] medical records  [id number]         Blood Pressure from Last 3 Encounters:   05/29/18 130/70   02/22/18 130/60   06/28/17 125/69    Weight from Last 3 Encounters:   05/29/18 110.3 kg (243 lb 3.2 oz)   02/22/18 109.3 kg (241 lb)   06/28/17 110.1 kg (242 lb 12.8 oz)              Today, you had the following     No orders found for display         Today's Medication Changes          These changes are accurate as of 6/26/18  9:39 AM.  If you have any questions, ask your nurse or doctor.                Start taking these medicines.        Dose/Directions    blood glucose monitoring lancets   Used for:  Type 2 diabetes mellitus without complication, without long-term current use of insulin (H)        Use to test blood sugars 1 times daily or as directed.   Quantity:  100 each   Refills:  11       blood glucose monitoring meter device kit   Used for:  Type 2 diabetes mellitus without complication, without long-term current use of insulin (H)        Dose:  1 kit   1 kit by In Vitro route daily #  [id number] Lot # [id number] Exp: 5/31/19   Quantity:  1 kit   Refills:  0       blood glucose monitoring test strip   Commonly known as:  no brand specified   Used for:  Type 2 diabetes mellitus without complication, without long-term current use of insulin (H)        Test once daily - verio test strips   Quantity:  100 strip   Refills:  11            Where to get your medicines      These medications were sent to [id number] - [place], [place] - [address]  [address], [place] [id number]     Phone:  [phone]     blood glucose monitoring lancets    blood glucose monitoring test strip         Some of these will need a paper prescription and others can be bought over the counter.  Ask your nurse if you have questions.     You don't need a prescription for these medications     blood glucose monitoring meter device kit                Primary Care Provider Office Phone # Fax #    Roverto Edwards [phone]       [address]  [place] [id number]        Equal Access to Services     KAYLA SANTAMARIA AH: Hadii aad ku hadasho Soomaali, waaxda luqadaha, qaybta kaalmada adeegyada, suzy kaye adequinten rasmussen. So [facility] [phone].    ATENCIÓN: Si habla español, tiene a alvarez disposición servicios gratuitos de asistencia lingüística. Llame al [phone].    We comply with applicable federal civil rights laws and [place] laws. We do not discriminate on the basis of  race, color, national origin, age, disability, sex, sexual orientation, or gender identity.            Thank you!     Thank you for choosing [facility]  for your care. Our goal is always to provide you with excellent care. Hearing back from our patients is one way we can continue to improve our services. Please take a few minutes to complete the written survey that you may receive in the mail after your visit with us. Thank you!             Your Updated Medication List - Protect others around you: Learn how to safely use, store and throw away your medicines at www.disposemymeds.org.          This list is accurate as of 6/26/18  9:39 AM.  Always use your most recent med list.                   Brand Name Dispense Instructions for use Diagnosis    aspirin 81 MG tablet     100    ONE DAILY    Routine general medical examination at a health care facility, Other and unspecified hyperlipidemia       atenolol 50 MG tablet    TENORMIN    90 tablet    Take 1 tablet (50 mg) by mouth daily    Benign essential hypertension       blood glucose monitoring lancets     100 each    Use to test blood sugars 1 times daily or as directed.    Type 2 diabetes mellitus without complication, without long-term current use of insulin (H)       blood glucose monitoring meter device kit     1 kit    1 kit by In Vitro route daily SN#  [id number] Lot # [id number] Exp: 5/31/19    Type 2 diabetes mellitus without complication, without long-term current use of insulin (H)       blood glucose monitoring test strip    no brand specified    100 strip    Test once daily - verio test strips    Type 2 diabetes mellitus without complication, without long-term current use of insulin (H)       fluticasone 50 MCG/ACT spray    FLONASE    48 g    Spray 2 sprays into both nostrils daily    Chronic rhinitis       hydrochlorothiazide 25 MG tablet    HYDRODIURIL    90 tablet    Take 1 tablet (25 mg) by mouth daily    Benign essential  hypertension       simvastatin 40 MG tablet    ZOCOR    90 tablet    Take 1 tablet (40 mg) by mouth At Bedtime    Hyperlipidemia LDL goal <130       tadalafil 20 MG tablet    CIALIS    16 tablet    Take one tablet by mouth. TAKE AT LEAST 30 MINUTES BEFORE INTERCOURSE,    Impotence of organic origin

## 2018-06-28 ENCOUNTER — HOSPITAL ENCOUNTER (OUTPATIENT)
Facility: CLINIC | Age: 73
Discharge: HOME | End: 2018-06-28
Attending: INTERNAL MEDICINE | Admitting: INTERNAL MEDICINE
Payer: COMMERCIAL

## 2018-06-28 VITALS
RESPIRATION RATE: 18 BRPM | DIASTOLIC BLOOD PRESSURE: 60 MMHG | SYSTOLIC BLOOD PRESSURE: 109 MMHG | OXYGEN SATURATION: 95 %

## 2018-06-28 LAB — COLONOSCOPY: NORMAL

## 2018-06-28 PROCEDURE — 25000128 H RX IP 250 OP 636: Performed by: INTERNAL MEDICINE

## 2018-06-28 PROCEDURE — G0500 MOD SEDAT ENDO SERVICE >5YRS: HCPCS | Performed by: INTERNAL MEDICINE

## 2018-06-28 PROCEDURE — 45378 DIAGNOSTIC COLONOSCOPY: CPT | Performed by: INTERNAL MEDICINE

## 2018-06-28 PROCEDURE — G0121 COLON CA SCRN NOT HI RSK IND: HCPCS | Performed by: INTERNAL MEDICINE

## 2018-06-28 RX ORDER — ONDANSETRON 2 MG/ML
4 INJECTION INTRAMUSCULAR; INTRAVENOUS EVERY 6 HOURS PRN
Status: DISCONTINUED | OUTPATIENT
Start: 2018-06-28 | End: 2018-06-28 | Stop reason: HOSPADM

## 2018-06-28 RX ORDER — FLUMAZENIL 0.1 MG/ML
0.2 INJECTION, SOLUTION INTRAVENOUS
Status: DISCONTINUED | OUTPATIENT
Start: 2018-06-28 | End: 2018-06-28 | Stop reason: HOSPADM

## 2018-06-28 RX ORDER — LIDOCAINE 40 MG/G
CREAM TOPICAL
Status: DISCONTINUED | OUTPATIENT
Start: 2018-06-28 | End: 2018-06-28 | Stop reason: HOSPADM

## 2018-06-28 RX ORDER — FLUMAZENIL 0.1 MG/ML
0.2 INJECTION, SOLUTION INTRAVENOUS
Status: DISCONTINUED | OUTPATIENT
Start: 2018-06-28 | End: 2018-06-28

## 2018-06-28 RX ORDER — NALOXONE HYDROCHLORIDE 0.4 MG/ML
.1-.4 INJECTION, SOLUTION INTRAMUSCULAR; INTRAVENOUS; SUBCUTANEOUS
Status: DISCONTINUED | OUTPATIENT
Start: 2018-06-28 | End: 2018-06-28 | Stop reason: HOSPADM

## 2018-06-28 RX ORDER — FENTANYL CITRATE 50 UG/ML
INJECTION, SOLUTION INTRAMUSCULAR; INTRAVENOUS PRN
Status: DISCONTINUED | OUTPATIENT
Start: 2018-06-28 | End: 2018-06-28 | Stop reason: HOSPADM

## 2018-06-28 RX ORDER — ONDANSETRON 4 MG/1
4 TABLET, ORALLY DISINTEGRATING ORAL EVERY 6 HOURS PRN
Status: DISCONTINUED | OUTPATIENT
Start: 2018-06-28 | End: 2018-06-28 | Stop reason: HOSPADM

## 2018-06-28 RX ORDER — ONDANSETRON 2 MG/ML
4 INJECTION INTRAMUSCULAR; INTRAVENOUS
Status: DISCONTINUED | OUTPATIENT
Start: 2018-06-28 | End: 2018-06-28 | Stop reason: HOSPADM

## 2018-06-28 NOTE — LETTER
June 13, 2018  Connor Srinivasan  [address]  [facility] [address]        Thank you for choosing [facility]. You are scheduled for the following service.     Date:  Thursday, June 28             Procedure:  COLONOSCOPY  Doctor:        Dr. Benigno Landaverde   Arrival Time:   12noon  *Check in at Emergency/Endoscopy desk*  Procedure Time:  12:30pm    Location:   [facility]        Endoscopy Department, First Floor (Enter through ER Doors) *        [address] [place], [place] [address]      [phone] or [phone] () to reschedule      MIRALAX -GATORADE  PREP  Colonoscopy is the most accurate test to detect colon polyps and colon cancer; and the only test where polyps can be removed. During this procedure, a doctor examines the lining of your large intestine and rectum through a flexible tube.           Transportation  Arrange for a ride for the day of your procedure with a responsible adult.  A taxi ride is not an option unless you are accompanied by a responsible adult. If you fail to arrange transportation with a responsible adult, your procedure will be cancelled and rescheduled.    Purchase the  following supplies at your local pharmacy:  - 2 (two) bisacodyl tablets: each tablet contains 5 mg.  (Dulcolax  laxative NOT Dulcolax  stool softener)   - 1 (one) 8.3 oz bottle of Polyethylene Glycol (PEG) 3350 Powder   (MiraLAX , Smooth LAX , ClearLAX  or equivalent)  - 64 oz Gatorade    Regular Gatorade, Gatorade G2 , Powerade , Powerade Zero  or Pedialyte  is acceptable. Red colored flavors are not allowed; all other colors (yellow, green, orange, purple and blue) are okay. It is also okay to buy two 2.12 oz packets of powdered Gatorade that can be mixed with water to a total volume of 64 oz of liquid.  - 1 (one) 10 oz bottle of Magnesium Citrate (Red colored flavors are not allowed)  It is also okay for you to use a 0.5 oz  package of powdered magnesium citrate (17 g) mixed with 10 oz of water.    PREPARATION FOR COLONOSCOPY    7 days before:    Discontinue fiber supplements and medications containing iron. This includes Metamucil  and Fibercon ; and multivitamins with iron.  3 days before:    Begin a low-fiber diet. A low-fiber diet helps making the cleanout more effective.     Examples of a low-fiber diet include (but are not limited to): white bread, white rice, pasta, crackers, fish, chicken, eggs, ground beef, creamy peanut butter, cooked/steamed/boiled vegetables, canned fruit, bananas, melons, milk, plain yogurt cheese, salad dressing and other condiments.     The following are not allowed on a low-fiber diet: seeds, nuts, popcorn, bran, whole wheat, corn, quinoa, raw fruits and vegetables, berries and dried fruit, beans and lentils.    For additional details on low-fiber diet, please refer to the table on the last page.  2 days before:    Continue the low-fiber diet.     Drink at least 8 glasses of water throughout the day.     Stop eating solid foods at 11:45 pm.  1 day before:    In the morning: begin a clear liquid diet (liquids you can see through).     Examples of a clear liquid diet include: water, clear broth or bouillon, Gatorade, Pedialyte or Powerade, carbonated and non-carbonated soft drinks (Sprite , 7-Up , ginger ale), strained fruit juices without pulp (apple, white grape, white cranberry), Jell-O  and popsicles.     The following are not allowed on a clear liquid diet: red liquids, alcoholic beverages, dairy products (milk, creamer, and yogurt), protein shakes, creamy broths, juice with pulp and chewing tobacco.    At noon: take 2 (two) bisacodyl tablets     At 4 (and no later than 6pm): start drinking the Miralax-Gatorade preparation (8.3 oz of Miralax mixed with 64 oz of Gatorade in a large pitcher). Drink 1(one) 8 oz glass every 15 minutes thereafter, until the mixture is gone.    COLON CLEANSING TIPS: drink  adequate amounts of fluids before and after your colon cleansing to prevent dehydration. Stay near a toilet because you will have diarrhea. Even if you are sitting on the toilet, continue to drink the cleansing solution every 15 minutes. If you feel nauseous or vomit, rinse your mouth with water, take a 15 to 30-minute-break and then continue drinking the solution. You will be uncomfortable until the stool has flushed from your colon (in about 2 to 4 hours). You may feel chilled.              Day of your procedure  You may take all of your morning medications including blood pressure medications, blood thinners (if you have not been instructed to stop these by our office), methadone, anti-seizure medications with sips of water 3 hours prior to your procedure or earlier. Do not take insulin or vitamins prior to your procedure. Continue the clear liquid diet.   4 hours prior: drink 10 oz of magnesium citrate. It may be easier to drink it with a straw.    STOP consuming all liquids after that.     Do not take anything by mouth during this time.     Allow extra time to travel to your procedure as you may need to stop and use a restroom along the way.  You are ready for the procedure, if you followed all instructions and your stool is no longer formed, but clear or yellow liquid. If you are unsure whether your colon is clean, please call our office at [phone] before you leave for your appointment.  Bring the following to your procedure:  - Insurance Card/Photo ID.   - List of current medications including over-the-counter medications and supplements.   - Your rescue inhaler if you currently use one to control asthma.      Canceling or rescheduling your appointment:   If you must cancel or reschedule your appointment, please call [phone] as soon as possible.      COLONOSCOPY PRE-PROCEDURE CHECKLIST  If you have diabetes, ask your regular doctor for diet and medication restrictions.  If you take an anticoagulant  or anti-platelet medication (such as Coumadin , Lovenox , Pradaxa , Xarelto , Eliquis , etc.), please call your primary doctor for advice on holding this medication.  If you take aspirin you may continue to do so.  If you are or may be pregnant, please discuss the risks and benefits of this procedure with your doctor.          What happens during a colonoscopy?    Plan to spend up to two hours, starting at registration time, at the endoscopy center the day of your procedure. The colonoscopy takes an average of 15 to 30 minutes. Recovery time is about 30 minutes.    Before the exam:    You will change into a gown.    Your medical history and medication list will be reviewed with you, unless that has been done over the phone prior to the procedure.     A nurse will insert an intravenous (IV) line into your hand or arm.    The doctor will meet with you and will give you a consent form to sign.    During the exam:     Medicine will be given through the IV line to help you relax.     Your heart rate and oxygen levels will be monitored. If your blood pressure is low, you may be given fluids through the IV line.     The doctor will insert a flexible hollow tube, called a colonoscope, into your rectum. The scope will be advanced slowly through the large intestine (colon).    You may have a feeling of fullness or pressure.     If an abnormal tissue or a polyp is found, the doctor may remove it through the endoscope for closer examination, or biopsy. Tissue removal is painless    After the exam:           Any tissue samples removed during the exam will be sent to a lab for evaluation. It may take 5-7 working days for you to be notified of the results.     A nurse will provide you with complete discharge instructions before you leave the endoscopy center. Be sure to ask the nurse for specific instructions if you take blood thinners such as Aspirin, Coumadin or Plavix.     The doctor will prepare a full report for you and for  the physician who referred you for the procedure.     Your doctor will talk with you about the initial results of your exam.      Medication given during the exam will prohibit you from driving for the rest of the day.     Following the exam, you may resume your normal diet. Your first meal should be light, no greasy foods. Avoid alcohol until the next day.     You may resume your regular activities the day after the procedure.     LOW-FIBER DIET    Foods RECOMMENDED Foods to AVOID   Breads, Cereal, Rice and Pasta:   White bread, rolls, biscuits, croissant and gus toast.   Waffles, [place] toast and pancakes.   White rice, noodles, pasta, macaroni and peeled cooked potatoes.   Plain crackers and saltines.   Cooked cereals: farina, cream of rice.   Cold cereals: Puffed Rice , Rice Krispies , Corn Flakes  and Special K    Breads, Cereal, Rice and Pasta:   Breads or rolls with nuts, seeds or fruit.   Whole wheat, pumpernickel, rye breads and cornbread.   Potatoes with skin, brown or wild rice, and kasha (buckwheat).     Vegetables:   Tender cooked and canned vegetables without seeds: carrots, asparagus tips, green or wax beans, pumpkin, spinach, lima beans. Vegetables:   Raw or steamed vegetables.   Vegetables with seeds.   Sauerkraut.   Winter squash, peas, broccoli, Brussel sprouts, cabbage, onions, cauliflower, baked beans, peas and corn.   Fruits:   Strained fruit juice.   Canned fruit, except pineapple.   Ripe bananas and melon. Fruits:   Prunes and prune juice.   Raw fruits.   Dried fruits: figs, dates and raisins.   Milk/Dairy:   Milk: plain or flavored.   Yogurt, custard and ice cream.   Cheese and cottage cheese Milk/Dairy:     Meat and other proteins:   ground, well-cooked tender beef, lamb, ham, veal, pork, fish, poultry and organ meats.   Eggs.   Peanut butter without nuts. Meat and other proteins:   Tough, fibrous meats with gristle.   Dry beans, peas and lentils.   Peanut butter with nuts.   Tofu.    Fats, Snack, Sweets, Condiments and Beverages:   Margarine, butter, oils, mayonnaise, sour cream and salad dressing, plain gravy.   Sugar, hard candy, clear jelly, honey and syrup.   Spices, cooked herbs, bouillon, broth and soups made with allowed vegetable, ketchup and mustard.   Coffee, tea and carbonated drinks.   Plain cakes, cookies and pretzels.   Gelatin, plain puddings, custard, ice cream, sherbet and popsicles. Fats, Snack, Sweets, Condiments and Beverages:   Nuts, seeds and coconut.   Jam, marmalade and preserves.   Pickles, olives, relish and horseradish.   All desserts containing nuts, seeds, dried fruit and coconut; or made from whole grains or bran.   Candy made with nuts or seeds.   Popcorn.                     DIRECTIONS TO THE ENDOSCOPY DEPARTMENT     From the north ([facility][1])  Take [address] South, exit on [address] [address]. Get into the left hand maggy, turn left (east), go one-half mile to [address] and turn left. Go north to the first stoplight, take a right on [address] and follow it to the Emergency entrance.    From the south ([facility] [place])  Take 35N to the 35E split and exit on [address] [address]. On [address] [address], turn left (west) to [address]. Turn right (north) on [address]. Go north to the first stoplight, take a right on [address] and follow it to the Emergency entrance.    From the east via 35E ([facility])  Take 35E south to [address] [address] exit. Turn right on [address] . Go west to [address]. Turn right (north) on [address]. Go to the first stoplight, take a right and follow on [address] to the Emergency entrance.    From the east via [address] ([facility])  Take [address] to [address]. Turn left (south) on [address] to [address]. Turn left (east) on [address] and follow it to the Emergency entrance.    From the west via [address] ([place])  Take [address] east  to [address]. Turn right (south) on [address] to [address]. Turn left (east) on [address] and follow it to the Emergency entrance.

## 2018-06-28 NOTE — H&P
Pre-Endoscopy History and Physical     Connor Srinivasan MRN# [id number]   YOB: 1945 Age: 73 year old     Date of Procedure: 6/28/2018  Primary care provider: Roverto Edwards  Type of Endoscopy: Colonoscopy with possible biopsy, possible polypectomy  Reason for Procedure: screen  Type of Anesthesia Anticipated: Conscious Sedation    HPI:    Connor is a 73 year old male who will be undergoing the above procedure.      A history and physical has been performed. The patient's medications and allergies have been reviewed. The risks and benefits of the procedure and the sedation options and risks were discussed with the patient.  All questions were answered and informed consent was obtained.      He denies a personal or family history of anesthesia complications or bleeding disorders.     Patient Active Problem List   Diagnosis     Essential hypertension     Localized osteoarthrosis, lower leg     Chronic rhinitis     ERECTILE DYSFUNCTION     Overweight     HYPERLIPIDEMIA LDL GOAL <130     Advanced directives, counseling/discussion     Health Care Home     Other abnormal glucose     Obesity, BMI>35 with comorbidity     Type 2 diabetes mellitus without complication, without long-term current use of insulin (H)        Past Medical History:   Diagnosis Date     Chronic rhinitis      Localized osteoarthrosis not specified whether primary or secondary, lower leg      Onychomycoses      Other and unspecified hyperlipidemia      Unspecified essential hypertension         Past Surgical History:   Procedure Laterality Date     C TOTAL KNEE ARTHROPLASTY  1/2004    Knee Replacement, Total (Right)     C TOTAL KNEE ARTHROPLASTY  10/2003    Knee Replacement, Total (Left)     HC COLONOSCOPY THRU STOMA, DIAGNOSTIC  4/3/2006    Normal     HC EXCISION PILONIDAL LESION SIMPLE  1966     PHACOEMULSIFICATION CLEAR CORNEA WITH STANDARD INTRAOCULAR LENS IMPLANT Right 6/12/2017    Procedure: PHACOEMULSIFICATION CLEAR CORNEA  WITH STANDARD INTRAOCULAR LENS IMPLANT;  RIGHT PHACOEMULSIFICATION CLEAR CORNEA WITH STANDARD INTRAOCULAR LENS IMPLANT ;  Surgeon: John Sanchez MD;  Location: [facility]     PHACOEMULSIFICATION CLEAR CORNEA WITH STANDARD INTRAOCULAR LENS IMPLANT Left 2017    Procedure: PHACOEMULSIFICATION CLEAR CORNEA WITH STANDARD INTRAOCULAR LENS IMPLANT;  LEFT PHACOEMULSIFICATION CLEAR CORNEA WITH STANDARD INTRAOCULAR LENS IMPLANT ;  Surgeon: John Sanchez MD;  Location: [facility]       Social History   Substance Use Topics     Smoking status: Never Smoker     Smokeless tobacco: Never Used     Alcohol use Yes      Comment: rarely       Family History   Problem Relation Age of Onset     HEART DISEASE Mother       age 90, had two valves replaced, on coumadin. Hospitalized for GI bleeding in .     C.A.D. Father       in his 80's around .     Arthritis Sister      Born 1946, bilateral knee replacement for arthritis     Colon Cancer No family hx of        Prior to Admission medications    Medication Sig Start Date End Date Taking? Authorizing Provider   ASPIRIN 81 MG OR TABS ONE DAILY 08  Yes Roverto Edwards MD   atenolol (TENORMIN) 50 MG tablet Take 1 tablet (50 mg) by mouth daily 18  Yes Roverto Edwards MD   hydrochlorothiazide (HYDRODIURIL) 25 MG tablet Take 1 tablet (25 mg) by mouth daily 18  Yes Roverto Edwards MD   blood glucose monitoring (NO BRAND SPECIFIED) test strip Test once daily - verio test strips 18   Roverto Edwards MD   blood glucose monitoring (ONE TOUCH DELICA) lancets Use to test blood sugars 1 times daily or as directed. 18   Roverto Edwards MD   blood glucose monitoring (ONETOUCH VERIO SYNC SYSTEM) meter device kit 1 kit by [employer] route daily SN#  [id number]  Lot # [id number]  Exp: 19   Roverto Edwards MD   fluticasone (FLONASE) 50 MCG/ACT spray Spray 2 sprays into both nostrils daily 17   Roverto Edwards MD   simvastatin (ZOCOR) 40  "MG tablet Take 1 tablet (40 mg) by mouth At Bedtime 5/29/18   Roverto Edwards MD   tadalafil (CIALIS) 20 MG tablet Take one tablet by mouth. TAKE AT LEAST 30 MINUTES BEFORE INTERCOURSE, 5/26/17   Roverto Edwards MD       Allergies   Allergen Reactions     No Known Drug Allergies         REVIEW OF SYSTEMS:   5 point ROS negative except as noted above in HPI, including Gen., Resp., CV, GI &  system review.    PHYSICAL EXAM:   There were no vitals taken for this visit. Estimated body mass index is 35.91 kg/(m^2) as calculated from the following:    Height as of 5/29/18: 1.753 m (5' 9\").    Weight as of 5/29/18: 110.3 kg (243 lb 3.2 oz).   GENERAL APPEARANCE: alert, and oriented  MENTAL STATUS: alert  AIRWAY EXAM: Mallampatti Class I (visualization of the soft palate, fauces, uvula, anterior and posterior pillars)  RESP: lungs clear to auscultation - no rales, rhonchi or wheezes  CV: regular rates and rhythm  DIAGNOSTICS:    Not indicated    IMPRESSION   ASA Class 2 - Mild systemic disease    PLAN:   Plan for Colonoscopy with possible biopsy, possible polypectomy. We discussed the risks, benefits and alternatives and the patient wished to proceed.    The above has been forwarded to the consulting provider.      Signed Electronically by: Benigno Landaverde  June 28, 2018          "

## 2018-08-15 ENCOUNTER — ALLIED HEALTH/NURSE VISIT (OUTPATIENT)
Dept: EDUCATION SERVICES | Facility: CLINIC | Age: 73
End: 2018-08-15
Payer: COMMERCIAL

## 2018-08-15 VITALS — WEIGHT: 233 LBS | BODY MASS INDEX: 34.41 KG/M2

## 2018-08-15 DIAGNOSIS — I10 BENIGN ESSENTIAL HYPERTENSION: ICD-10-CM

## 2018-08-15 DIAGNOSIS — E11.9 TYPE 2 DIABETES MELLITUS WITHOUT COMPLICATION, WITHOUT LONG-TERM CURRENT USE OF INSULIN (H): Primary | ICD-10-CM

## 2018-08-15 PROCEDURE — G0108 DIAB MANAGE TRN  PER INDIV: HCPCS

## 2018-08-15 PROCEDURE — 99207 ZZC DROP WITH A PROCEDURE: CPT

## 2018-08-15 NOTE — LETTER
8/15/2018         RE: Connor Srinivasan  [address]  [facility] [address]        Dear Colleague,    Thank you for referring your patient, Connor Srinivasan, to the [facility]. Please see a copy of my visit note below.    Diabetes Self Management Training: Follow-up Visit    Connor Srinivasan presents today for education Type 2 diabetes.    He is accompanied by self    Patient's diabetes management related comments/concerns: making changes, more aware of what he is eating, losing weight    Patient would like this visit to be focused around the following diabetes-related behaviors and goals: monitoring and healthy eating    ASSESSMENT:  Patient Problem List reviewed for relevant medical history and current medical status.    Current Diabetes Management per Patient:  Taking diabetes medications? no    Do you have any difficulty affording your medications or glucose monitoring supplies?  No      *Abbreviated insulin dose documentation key: Insulin trade name ([id number]-tfabtd-[id number]) - i.e. Humalog 5-5-5-0 (Humalog 5 units at breakfast, 5 units at lunch, and 5 units at dinner).    Patient glucose self monitoring as follows: one time daily.   BG meter: One Touch Verio Flex meter  BG results:          BG values are: In goal  Patient's most recent   Lab Results   Component Value Date    A1C 6.6 05/29/2018    is meeting goal of <7.0    Nutrition:  Patient currently eats 3 meals per day and is watching portions and  drinking more water and less soda.    Breakfast - cereal- shredded wheat/cheerios/ honey bunches of oats- milk, OR eggs, toast/english muffin, sausage/dutton OR occasionally pancakes/ waffles  Lunch - leftovers or eating out- [employer]   Dinner - roast, potatoes, vegetables OR hotdish  Snacks - nuts, chips, cookies    Physical Activity:    Works at [employer]- walks a lot- at least 11,000 steps    Diabetes Complications:  Acute Complications: At risk  "for hypoglycemia? no  Symptoms of hyperglycemia? none  Chronic Complication Prevention: Eyes: exam within in the last year? Yes  Nerve/Circulation: foot exam within the last year Yes  Heart Health: BP to goal Yes, LDL to goal Yes, Daily Aspirin Yes  Dental Health: brushing/flossing regularly Yes, dental exam within last year Yes  Immunizations (flu/pneumonia) up to date? Yes, personal preference no flu shots      Recent health service and resource utilization related to diabetes (hyperglycemia, hypoglycemia, etc.):  None    Vitals:  There were no vitals taken for this visit.  Estimated body mass index is 35.91 kg/(m^2) as calculated from the following:    Height as of 5/29/18: 1.753 m (5' 9\").    Weight as of 5/29/18: 110.3 kg (243 lb 3.2 oz).   Last 3 BP:   BP Readings from Last 3 Encounters:   06/28/18 109/60   05/29/18 130/70   02/22/18 130/60       History   Smoking Status     Never Smoker   Smokeless Tobacco     Never Used       Labs:  Lab Results   Component Value Date    A1C 6.6 05/29/2018     Lab Results   Component Value Date     05/29/2018     Lab Results   Component Value Date    LDL 70 05/29/2018     HDL Cholesterol   Date Value Ref Range Status   05/29/2018 28 (L) >39 mg/dL Final   ]  GFR Estimate   Date Value Ref Range Status   05/29/2018 90 >60 mL/min/1.7m2 Final     Comment:     Non  GFR Calc     GFR Estimate If Black   Date Value Ref Range Status   05/29/2018 >90 >60 mL/min/1.7m2 Final     Comment:      GFR Calc     Lab Results   Component Value Date    CR 0.84 05/29/2018     No results found for: MICROALBUMIN    Health Beliefs and Attitudes:   Patient Activation Measure Survey Score:  RISHABH Score (Last Two) 4/29/2011   RISHABH Raw Score 52   Activation Score 100   RISHABH Level 4       Stage of Change: ACTION (Actively working towards change)    Progress toward meeting diabetes-related behavioral goals:    GOALS % Met Goal   Healthy Eating 100 (watching portions at " meals)   Physical Activity     Monitoring     Medication Taking     Problem Solving     Healthy Coping     Risk Reduction       INTERVENTION:    Education provided today on:  AADE Self-Care Behaviors:  Healthy Eating: consistency in amount, composition, and timing of food intake  Monitoring: log and interpret results, individual blood glucose targets and frequency of monitoring  Reducing Risks: major complications of diabetes, prevention, early diagnostic measures and treatment of complications, foot care, appropriate dental care, annual eye exam, aspirin therapy, A1C - goals, relating to blood glucose levels, how often to check, lipids levels and goals and blood pressure and goals  Healthy Coping: recognize feelings about diagnosis and benefits of making appropriate lifestyle changes    Opportunities for ongoing education and support in diabetes-self management were discussed.    Pt verbalized understanding of concepts discussed and recommendations provided today.       Education Materials Provided:  BG Log Sheet and foot care    PLAN:  See Patient Instructions for co-developed, patient-stated behavior change goals.  AVS printed and provided to patient today.    FOLLOW-UP:  Chart routed to referring provider.    Ongoing plan for education and support: Follow-up visit with diabetes educator in 1 year    Annmarie Orourke RN,CDE   Time Spent: 45 minutes  Encounter Type: Individual    Any diabetes medication dose changes were made via the CDE Protocol and Collaborative Practice Agreement with the patient's referring provider. A copy of this encounter was shared with the provider.

## 2018-08-15 NOTE — PATIENT INSTRUCTIONS
Care Plan:  Meal Plan Recommendation: eat 3 meals a day, have small snacks between meals, if needed and use portion control  Exercise / activity plan: continue with your activity  Check blood sugars- once a week, test before a meal and 2 hours after the start of that meal  (rotate between breakfast, lunch, supper)    Follow up:  Follow-up diabetes education yearly or sooner if needed     Bring blood glucose meter and logbook with you to all doctor and follow-up appointments.     [facility] the [facility]:  For Your Diabetes or Nutrition Education Appointments Call:  [phone]   For Diabetes or Nutrition Related Questions Call or Email:   [phone]  [email or web address]  Fax: [phone]   If you need a medication refill please contact your pharmacy. Please allow 3 business days for your refills to be completed.     Instructions for emailing the Diabetes Educators    If you need to communicate a non-urgent message to a Diabetes Educator via email, please send to [email or web address].    Please follow the following email guidelines:    Subject line: Secure: your clinic name (example: Secure: [facility])  In the email please include: First name, middle initial, last name and date of birth.    We will be in touch with you within one (1) business day.

## 2018-08-15 NOTE — MR AVS SNAPSHOT
After Visit Summary   8/15/2018    Connor Srinivasan    MRN: [id number]           Patient Information     Date Of Birth          1945        Visit Information        Provider Department      8/15/2018 8:30 AM Annmarie Orourke RN [facility] [place]        Today's Diagnoses     Type 2 diabetes mellitus without complication, without long-term current use of insulin (H)    -  1      Care Instructions    Care Plan:  Meal Plan Recommendation: eat 3 meals a day, have small snacks between meals, if needed and use portion control  Exercise / activity plan: continue with your activity  Check blood sugars- once a week, test before a meal and 2 hours after the start of that meal  (rotate between breakfast, lunch, supper)    Follow up:  Follow-up diabetes education yearly or sooner if needed     Bring blood glucose meter and logbook with you to all doctor and follow-up appointments.     [facility] the [facility]:  For Your Diabetes or Nutrition Education Appointments Call:  [phone]   For Diabetes or Nutrition Related Questions Call or Email:   [phone]  [email or web address]  Fax: [phone]   If you need a medication refill please contact your pharmacy. Please allow 3 business days for your refills to be completed.     Instructions for emailing the Diabetes Educators    If you need to communicate a non-urgent message to a Diabetes Educator via email, please send to [email or web address].    Please follow the following email guidelines:    Subject line: Secure: your clinic name (example: Secure: [facility])  In the email please include: First name, middle initial, last name and date of birth.    We will be in touch with you within one (1) business day.            Follow-ups after your visit        Who to contact     If you have questions or need follow up information about today's clinic visit or your schedule please  contact [facility] directly at [phone].  Normal or non-critical lab and imaging results will be communicated to you by MyChart, letter or phone within 4 business days after the clinic has received the results. If you do not hear from us within 7 days, please contact the clinic through MyChart or phone. If you have a critical or abnormal lab result, we will notify you by phone as soon as possible.  Submit refill requests through [employer] or call your pharmacy and they will forward the refill request to us. Please allow 3 business days for your refill to be completed.          Additional Information About Your Visit        [facility] [facility] Information     [employer] gives you secure access to your electronic health record. If you see a primary care provider, you can also send messages to your care team and make appointments. If you have questions, please call your primary care clinic.  If you do not have a primary care provider, please call [phone] and they will assist you.        Care EveryWhere ID     This is your Care EveryWhere ID. This could be used by other organizations to access your [facility] medical records  [id number]        Your Vitals Were     BMI (Body Mass Index)                   34.41 kg/m2            Blood Pressure from Last 3 Encounters:   06/28/18 109/60   05/29/18 130/70   02/22/18 130/60    Weight from Last 3 Encounters:   08/15/18 105.7 kg (233 lb)   05/29/18 110.3 kg (243 lb 3.2 oz)   02/22/18 109.3 kg (241 lb)              Today, you had the following     No orders found for display       Primary Care Provider Office Phone # Fax #    Roverto Edwards [phone]       [address] [facility] [address]  [facility] [address]        Equal Access to Services     [facility] : Hadii anita Bruce, waaxda luidalia, qaybta kaalloretta flowers, suzy rasmussen. So [facility] [phone].    ATENCIÓN: Si zainab dong, rena valencia alvarez  disposición servicios gratuitos de asistencia lingüística. Loco dai [phone].    We comply with applicable federal civil rights laws and [place] laws. We do not discriminate on the basis of race, color, national origin, age, disability, sex, sexual orientation, or gender identity.            Thank you!     Thank you for choosing [facility]  for your care. Our goal is always to provide you with excellent care. Hearing back from our patients is one way we can continue to improve our services. Please take a few minutes to complete the written survey that you may receive in the mail after your visit with us. Thank you!             Your Updated Medication List - Protect others around you: Learn how to safely use, store and throw away your medicines at www.disposemymeds.org.          This list is accurate as of 8/15/18  9:17 AM.  Always use your most recent med list.                   Brand Name Dispense Instructions for use Diagnosis    aspirin 81 MG tablet     100    ONE DAILY    Routine general medical examination at a health care facility, Other and unspecified hyperlipidemia       atenolol 50 MG tablet    TENORMIN    90 tablet    Take 1 tablet (50 mg) by mouth daily    Benign essential hypertension       blood glucose monitoring lancets     100 each    Use to test blood sugars 1 times daily or as directed.    Type 2 diabetes mellitus without complication, without long-term current use of insulin (H)       blood glucose monitoring meter device kit     1 kit    1 kit by [employer] route daily #  [id number] Lot # [id number] Exp: 5/31/19    Type 2 diabetes mellitus without complication, without long-term current use of insulin (H)       blood glucose monitoring test strip    no brand specified    100 strip    Test once daily - verio test strips    Type 2 diabetes mellitus without complication, without long-term current use of insulin (H)       fluticasone 50 MCG/ACT spray    FLONASE    48  g    Spray 2 sprays into both nostrils daily    Chronic rhinitis       hydrochlorothiazide 25 MG tablet    HYDRODIURIL    90 tablet    Take 1 tablet (25 mg) by mouth daily    Benign essential hypertension       simvastatin 40 MG tablet    ZOCOR    90 tablet    Take 1 tablet (40 mg) by mouth At Bedtime    Hyperlipidemia LDL goal <130       tadalafil 20 MG tablet    CIALIS    16 tablet    Take one tablet by mouth. TAKE AT LEAST 30 MINUTES BEFORE INTERCOURSE,    Impotence of organic origin

## 2018-08-15 NOTE — PROGRESS NOTES
Diabetes Self Management Training: Follow-up Visit    Connor Srinivasan presents today for education Type 2 diabetes.    He is accompanied by self    Patient's diabetes management related comments/concerns: making changes, more aware of what he is eating, losing weight    Patient would like this visit to be focused around the following diabetes-related behaviors and goals: monitoring and healthy eating    ASSESSMENT:  Patient Problem List reviewed for relevant medical history and current medical status.    Current Diabetes Management per Patient:  Taking diabetes medications? no    Do you have any difficulty affording your medications or glucose monitoring supplies?  No      *Abbreviated insulin dose documentation key: Insulin trade name ([id number]) - i.e. Humalog 5-5-5-0 (Humalog 5 units at breakfast, 5 units at lunch, and 5 units at dinner).    Patient glucose self monitoring as follows: one time daily.   BG meter: One Touch Verio Flex meter  BG results:          BG values are: In goal  Patient's most recent   Lab Results   Component Value Date    A1C 6.6 05/29/2018    is meeting goal of <7.0    Nutrition:  Patient currently eats 3 meals per day and is watching portions and  drinking more water and less soda.    Breakfast - cereal- shredded wheat/cheerios/ honey bunches of oats- milk, OR eggs, toast/english muffin, sausage/dutton OR occasionally pancakes/ waffles  Lunch - leftovers or eating out- [employer]   Dinner - roast, potatoes, vegetables OR hotdish  Snacks - nuts, chips, cookies    Physical Activity:    Works at [employer]- walks a lot- at least 11,000 steps    Diabetes Complications:  Acute Complications: At risk for hypoglycemia? no  Symptoms of hyperglycemia? none  Chronic Complication Prevention: Eyes: exam within in the last year? Yes  Nerve/Circulation: foot exam within the last year Yes  Heart Health: BP to goal Yes, LDL to goal Yes, Daily Aspirin Yes  Dental Health:  "brushing/flossing regularly Yes, dental exam within last year Yes  Immunizations (flu/pneumonia) up to date? Yes, personal preference no flu shots      Recent health service and resource utilization related to diabetes (hyperglycemia, hypoglycemia, etc.):  None    Vitals:  There were no vitals taken for this visit.  Estimated body mass index is 35.91 kg/(m^2) as calculated from the following:    Height as of 5/29/18: 1.753 m (5' 9\").    Weight as of 5/29/18: 110.3 kg (243 lb 3.2 oz).   Last 3 BP:   BP Readings from Last 3 Encounters:   06/28/18 109/60   05/29/18 130/70   02/22/18 130/60       History   Smoking Status     Never Smoker   Smokeless Tobacco     Never Used       Labs:  Lab Results   Component Value Date    A1C 6.6 05/29/2018     Lab Results   Component Value Date     05/29/2018     Lab Results   Component Value Date    LDL 70 05/29/2018     HDL Cholesterol   Date Value Ref Range Status   05/29/2018 28 (L) >39 mg/dL Final   ]  GFR Estimate   Date Value Ref Range Status   05/29/2018 90 >60 mL/min/1.7m2 Final     Comment:     Non  GFR Calc     GFR Estimate If Black   Date Value Ref Range Status   05/29/2018 >90 >60 mL/min/1.7m2 Final     Comment:      GFR Calc     Lab Results   Component Value Date    CR 0.84 05/29/2018     No results found for: MICROALBUMIN    Health Beliefs and Attitudes:   Patient Activation Measure Survey Score:  RISHABH Score (Last Two) 4/29/2011   RISHABH Raw Score 52   Activation Score 100   RISHABH Level 4       Stage of Change: ACTION (Actively working towards change)    Progress toward meeting diabetes-related behavioral goals:    GOALS % Met Goal   Healthy Eating 100 (watching portions at meals)   Physical Activity     Monitoring     Medication Taking     Problem Solving     Healthy Coping     Risk Reduction       INTERVENTION:    Education provided today on:  AADE Self-Care Behaviors:  Healthy Eating: consistency in amount, composition, and timing of " food intake  Monitoring: log and interpret results, individual blood glucose targets and frequency of monitoring  Reducing Risks: major complications of diabetes, prevention, early diagnostic measures and treatment of complications, foot care, appropriate dental care, annual eye exam, aspirin therapy, A1C - goals, relating to blood glucose levels, how often to check, lipids levels and goals and blood pressure and goals  Healthy Coping: recognize feelings about diagnosis and benefits of making appropriate lifestyle changes    Opportunities for ongoing education and support in diabetes-self management were discussed.    Pt verbalized understanding of concepts discussed and recommendations provided today.       Education Materials Provided:  BG Log Sheet and foot care    PLAN:  See Patient Instructions for co-developed, patient-stated behavior change goals.  AVS printed and provided to patient today.    FOLLOW-UP:  Chart routed to referring provider.    Ongoing plan for education and support: Follow-up visit with diabetes educator in 1 year    Annmarie Orourke RN,CDE   Time Spent: 45 minutes  Encounter Type: Individual    Any diabetes medication dose changes were made via the CDE Protocol and Collaborative Practice Agreement with the patient's referring provider. A copy of this encounter was shared with the provider.

## 2018-08-15 NOTE — TELEPHONE ENCOUNTER
"Requested Prescriptions   Pending Prescriptions Disp Refills     hydrochlorothiazide (HYDRODIURIL) 25 MG tablet [Pharmacy Med Name: HydroCHLOROthiazide Oral Tablet 25 MG]  Last Written Prescription Date:  5/29/2018  Last Fill Quantity: 90,  # refills: 3   Last office visit: 5/29/2018 with prescribing provider:     Future Office Visit:   90 tablet 2     Sig: TAKE 1 TABLET (25 MG) BY MOUTH DAILY    Diuretics (Including Combos) Protocol Passed    8/15/2018  7:52 AM       Passed - Blood pressure under 140/90 in past 12 months    BP Readings from Last 3 Encounters:   06/28/18 109/60   05/29/18 130/70   02/22/18 130/60                Passed - Recent (12 mo) or future (30 days) visit within the authorizing provider's specialty    Patient had office visit in the last 12 months or has a visit in the next 30 days with authorizing provider or within the authorizing provider's specialty.  See \"Patient Info\" tab in inbasket, or \"Choose Columns\" in Meds & Orders section of the refill encounter.           Passed - Patient is age 18 or older       Passed - Normal serum creatinine on file in past 12 months    Recent Labs   Lab Test  05/29/18   0853   CR  0.84             Passed - Normal serum potassium on file in past 12 months    Recent Labs   Lab Test  05/29/18   0853   POTASSIUM  4.0                   Passed - Normal serum sodium on file in past 12 months    Recent Labs   Lab Test  05/29/18   0853   NA  141              "

## 2018-08-17 RX ORDER — HYDROCHLOROTHIAZIDE 25 MG/1
TABLET ORAL
Qty: 90 TABLET | Refills: 2 | Status: SHIPPED | OUTPATIENT
Start: 2018-08-17 | End: 2019-05-22

## 2018-11-30 DIAGNOSIS — R73.09 OTHER ABNORMAL GLUCOSE: ICD-10-CM

## 2018-11-30 LAB
GLUCOSE SERPL-MCNC: 129 MG/DL (ref 70–99)
HBA1C MFR BLD: 6.1 % (ref 0–5.6)

## 2018-11-30 PROCEDURE — 82947 ASSAY GLUCOSE BLOOD QUANT: CPT | Performed by: INTERNAL MEDICINE

## 2018-11-30 PROCEDURE — 83036 HEMOGLOBIN GLYCOSYLATED A1C: CPT | Performed by: INTERNAL MEDICINE

## 2018-11-30 PROCEDURE — 36415 COLL VENOUS BLD VENIPUNCTURE: CPT | Performed by: INTERNAL MEDICINE

## 2019-01-01 NOTE — DISCHARGE INSTRUCTIONS
The patient has received a copy of the Provation  report the doctor has written and discharge instructions have been discussed with the patient and responsible adult.  All questions were addressed and answered prior to patient discharge.     verbalizes understanding/demonstrates understanding of teaching

## 2019-02-27 ENCOUNTER — MYC MEDICAL ADVICE (OUTPATIENT)
Dept: INTERNAL MEDICINE | Facility: CLINIC | Age: 74
End: 2019-02-27

## 2019-02-27 DIAGNOSIS — E11.9 TYPE 2 DIABETES MELLITUS WITHOUT COMPLICATION, WITHOUT LONG-TERM CURRENT USE OF INSULIN (H): Primary | ICD-10-CM

## 2019-03-01 RX ORDER — GLUCOSAM/CHON-MSM1/C/MANG/BOSW 500-416.6
1 TABLET ORAL DAILY
Qty: 100 EACH | Refills: 1 | Status: SHIPPED | OUTPATIENT
Start: 2019-03-01

## 2019-03-01 NOTE — TELEPHONE ENCOUNTER
Patient's insurance no longer covers One Touch testing supplies. Preferred alternative is [employer].    Last office visit: 5/29/18, per Dr. Edwards's Office Visit note: See you in a year, sooner if problems.

## 2019-05-22 DIAGNOSIS — I10 BENIGN ESSENTIAL HYPERTENSION: ICD-10-CM

## 2019-05-22 RX ORDER — HYDROCHLOROTHIAZIDE 25 MG/1
TABLET ORAL
Qty: 90 TABLET | Refills: 0 | Status: SHIPPED | OUTPATIENT
Start: 2019-05-22 | End: 2019-06-11

## 2019-05-22 NOTE — TELEPHONE ENCOUNTER
"Requested Prescriptions   Pending Prescriptions Disp Refills     hydrochlorothiazide (HYDRODIURIL) 25 MG tablet [Pharmacy Med Name: hydroCHLOROthiazide Oral Tablet 25 MG]  Last Written Prescription Date:  8/17/18  Last Fill Quantity: 90,  # refills: 2   Last office visit: 5/29/2018 with prescribing provider:  Grace   Future Office Visit:   Next 5 appointments (look out 90 days)    Jun 11, 2019  8:20 AM CDT  [facility]YS[facility] [address]  [facility] [address]  [phone]         90 tablet 1     Sig: TAKE ONE TABLET BY MOUTH ONE TIME DAILY       Diuretics (Including Combos) Protocol Passed - 5/22/2019  8:19 AM        Passed - Blood pressure under 140/90 in past 12 months     BP Readings from Last 3 Encounters:   06/28/18 109/60   05/29/18 130/70   02/22/18 130/60                 Passed - Recent (12 mo) or future (30 days) visit within the authorizing provider's specialty     Patient had office visit in the last 12 months or has a visit in the next 30 days with authorizing provider or within the authorizing provider's specialty.  See \"Patient Info\" tab in inbasket, or \"Choose Columns\" in Meds & Orders section of the refill encounter.              Passed - Medication is active on med list        Passed - Patient is age 18 or older        Passed - Normal serum creatinine on file in past 12 months     Recent Labs   Lab Test 05/29/18  0853   CR 0.84              Passed - Normal serum potassium on file in past 12 months     Recent Labs   Lab Test 05/29/18  0853   POTASSIUM 4.0                    Passed - Normal serum sodium on file in past 12 months     Recent Labs   Lab Test 05/29/18  0853               Medication is being filled for 1 time refill only due to:  Future appointment scheduled    "

## 2019-06-11 ENCOUNTER — OFFICE VISIT (OUTPATIENT)
Dept: INTERNAL MEDICINE | Facility: CLINIC | Age: 74
End: 2019-06-11
Payer: COMMERCIAL

## 2019-06-11 VITALS
HEIGHT: 69 IN | OXYGEN SATURATION: 97 % | HEART RATE: 73 BPM | BODY MASS INDEX: 34.21 KG/M2 | TEMPERATURE: 97.7 F | WEIGHT: 231 LBS | RESPIRATION RATE: 16 BRPM | DIASTOLIC BLOOD PRESSURE: 64 MMHG | SYSTOLIC BLOOD PRESSURE: 112 MMHG

## 2019-06-11 DIAGNOSIS — E11.9 TYPE 2 DIABETES MELLITUS WITHOUT COMPLICATION, WITHOUT LONG-TERM CURRENT USE OF INSULIN (H): ICD-10-CM

## 2019-06-11 DIAGNOSIS — I10 BENIGN ESSENTIAL HYPERTENSION: ICD-10-CM

## 2019-06-11 DIAGNOSIS — Z23 NEED FOR VACCINATION: ICD-10-CM

## 2019-06-11 DIAGNOSIS — N52.9 IMPOTENCE OF ORGANIC ORIGIN: ICD-10-CM

## 2019-06-11 DIAGNOSIS — Z13.9 SCREENING FOR CONDITION: ICD-10-CM

## 2019-06-11 DIAGNOSIS — Z12.5 SCREENING PSA (PROSTATE SPECIFIC ANTIGEN): ICD-10-CM

## 2019-06-11 DIAGNOSIS — E78.5 HYPERLIPIDEMIA LDL GOAL <130: ICD-10-CM

## 2019-06-11 DIAGNOSIS — Z00.00 MEDICARE ANNUAL WELLNESS VISIT, SUBSEQUENT: Primary | ICD-10-CM

## 2019-06-11 LAB
ERYTHROCYTE [DISTWIDTH] IN BLOOD BY AUTOMATED COUNT: 13.7 % (ref 10–15)
HBA1C MFR BLD: 6.1 % (ref 0–5.6)
HCT VFR BLD AUTO: 49.4 % (ref 40–53)
HGB BLD-MCNC: 16.4 G/DL (ref 13.3–17.7)
MCH RBC QN AUTO: 32.3 PG (ref 26.5–33)
MCHC RBC AUTO-ENTMCNC: 33.2 G/DL (ref 31.5–36.5)
MCV RBC AUTO: 97 FL (ref 78–100)
PLATELET # BLD AUTO: 115 10E9/L (ref 150–450)
RBC # BLD AUTO: 5.07 10E12/L (ref 4.4–5.9)
WBC # BLD AUTO: 3.3 10E9/L (ref 4–11)

## 2019-06-11 PROCEDURE — 83036 HEMOGLOBIN GLYCOSYLATED A1C: CPT | Performed by: INTERNAL MEDICINE

## 2019-06-11 PROCEDURE — 82043 UR ALBUMIN QUANTITATIVE: CPT | Performed by: INTERNAL MEDICINE

## 2019-06-11 PROCEDURE — 80061 LIPID PANEL: CPT | Performed by: INTERNAL MEDICINE

## 2019-06-11 PROCEDURE — 84443 ASSAY THYROID STIM HORMONE: CPT | Performed by: INTERNAL MEDICINE

## 2019-06-11 PROCEDURE — G0439 PPPS, SUBSEQ VISIT: HCPCS | Performed by: INTERNAL MEDICINE

## 2019-06-11 PROCEDURE — G0103 PSA SCREENING: HCPCS | Performed by: INTERNAL MEDICINE

## 2019-06-11 PROCEDURE — 85027 COMPLETE CBC AUTOMATED: CPT | Performed by: INTERNAL MEDICINE

## 2019-06-11 PROCEDURE — 36415 COLL VENOUS BLD VENIPUNCTURE: CPT | Performed by: INTERNAL MEDICINE

## 2019-06-11 PROCEDURE — 99213 OFFICE O/P EST LOW 20 MIN: CPT | Mod: 25 | Performed by: INTERNAL MEDICINE

## 2019-06-11 PROCEDURE — 90471 IMMUNIZATION ADMIN: CPT | Performed by: INTERNAL MEDICINE

## 2019-06-11 PROCEDURE — 80053 COMPREHEN METABOLIC PANEL: CPT | Performed by: INTERNAL MEDICINE

## 2019-06-11 PROCEDURE — 90732 PPSV23 VACC 2 YRS+ SUBQ/IM: CPT | Performed by: INTERNAL MEDICINE

## 2019-06-11 RX ORDER — SIMVASTATIN 40 MG
40 TABLET ORAL AT BEDTIME
Qty: 90 TABLET | Refills: 3 | Status: SHIPPED | OUTPATIENT
Start: 2019-06-11 | End: 2020-06-11

## 2019-06-11 RX ORDER — ATENOLOL 50 MG/1
50 TABLET ORAL DAILY
Qty: 90 TABLET | Refills: 3 | Status: SHIPPED | OUTPATIENT
Start: 2019-06-11 | End: 2020-06-11

## 2019-06-11 RX ORDER — HYDROCHLOROTHIAZIDE 25 MG/1
25 TABLET ORAL DAILY
Qty: 90 TABLET | Refills: 3 | Status: SHIPPED | OUTPATIENT
Start: 2019-06-11 | End: 2020-06-11

## 2019-06-11 RX ORDER — TADALAFIL 20 MG/1
TABLET ORAL
Qty: 20 TABLET | Refills: 11 | Status: SHIPPED | OUTPATIENT
Start: 2019-06-11 | End: 2020-06-11

## 2019-06-11 SDOH — HEALTH STABILITY: MENTAL HEALTH
STRESS IS WHEN SOMEONE FEELS TENSE, NERVOUS, ANXIOUS, OR CAN'T SLEEP AT NIGHT BECAUSE THEIR MIND IS TROUBLED. HOW STRESSED ARE YOU?: NOT AT ALL

## 2019-06-11 SDOH — ECONOMIC STABILITY: FOOD INSECURITY: WITHIN THE PAST 12 MONTHS, YOU WORRIED THAT YOUR FOOD WOULD RUN OUT BEFORE YOU GOT MONEY TO BUY MORE.: NEVER TRUE

## 2019-06-11 SDOH — HEALTH STABILITY: MENTAL HEALTH: HOW MANY STANDARD DRINKS CONTAINING ALCOHOL DO YOU HAVE ON A TYPICAL DAY?: 1 OR 2

## 2019-06-11 SDOH — SOCIAL STABILITY: SOCIAL NETWORK: HOW OFTEN DO YOU ATTENT MEETINGS OF THE CLUB OR ORGANIZATION YOU BELONG TO?: NEVER

## 2019-06-11 SDOH — SOCIAL STABILITY: SOCIAL NETWORK
DO YOU BELONG TO ANY CLUBS OR ORGANIZATIONS SUCH AS CHURCH GROUPS UNIONS, FRATERNAL OR ATHLETIC GROUPS, OR SCHOOL GROUPS?: NO

## 2019-06-11 SDOH — SOCIAL STABILITY: SOCIAL NETWORK: HOW OFTEN DO YOU GET TOGETHER WITH FRIENDS OR RELATIVES?: MORE THAN THREE TIMES A WEEK

## 2019-06-11 SDOH — SOCIAL STABILITY: SOCIAL NETWORK: ARE YOU MARRIED, WIDOWED, DIVORCED, SEPARATED, NEVER MARRIED, OR LIVING WITH A PARTNER?: MARRIED

## 2019-06-11 SDOH — ECONOMIC STABILITY: TRANSPORTATION INSECURITY
IN THE PAST 12 MONTHS, HAS LACK OF TRANSPORTATION KEPT YOU FROM MEETINGS, WORK, OR FROM GETTING THINGS NEEDED FOR DAILY LIVING?: NO

## 2019-06-11 SDOH — SOCIAL STABILITY: SOCIAL INSECURITY
WITHIN THE LAST YEAR, HAVE TO BEEN RAPED OR FORCED TO HAVE ANY KIND OF SEXUAL ACTIVITY BY YOUR PARTNER OR EX-PARTNER?: NO

## 2019-06-11 SDOH — ECONOMIC STABILITY: INCOME INSECURITY: HOW HARD IS IT FOR YOU TO PAY FOR THE VERY BASICS LIKE FOOD, HOUSING, MEDICAL CARE, AND HEATING?: NOT HARD AT ALL

## 2019-06-11 SDOH — SOCIAL STABILITY: SOCIAL INSECURITY: WITHIN THE LAST YEAR, HAVE YOU BEEN HUMILIATED OR EMOTIONALLY ABUSED IN OTHER WAYS BY YOUR PARTNER OR EX-PARTNER?: NO

## 2019-06-11 SDOH — SOCIAL STABILITY: SOCIAL NETWORK
IN A TYPICAL WEEK, HOW MANY TIMES DO YOU TALK ON THE PHONE WITH FAMILY, FRIENDS, OR NEIGHBORS?: MORE THAN THREE TIMES A WEEK

## 2019-06-11 SDOH — SOCIAL STABILITY: SOCIAL NETWORK: HOW OFTEN DO YOU ATTEND CHURCH OR RELIGIOUS SERVICES?: MORE THAN 4 TIMES PER YEAR

## 2019-06-11 SDOH — HEALTH STABILITY: MENTAL HEALTH: HOW OFTEN DO YOU HAVE A DRINK CONTAINING ALCOHOL?: MONTHLY OR LESS

## 2019-06-11 SDOH — ECONOMIC STABILITY: TRANSPORTATION INSECURITY
IN THE PAST 12 MONTHS, HAS THE LACK OF TRANSPORTATION KEPT YOU FROM MEDICAL APPOINTMENTS OR FROM GETTING MEDICATIONS?: NO

## 2019-06-11 SDOH — ECONOMIC STABILITY: FOOD INSECURITY: WITHIN THE PAST 12 MONTHS, THE FOOD YOU BOUGHT JUST DIDN'T LAST AND YOU DIDN'T HAVE MONEY TO GET MORE.: NEVER TRUE

## 2019-06-11 SDOH — SOCIAL STABILITY: SOCIAL INSECURITY
WITHIN THE LAST YEAR, HAVE YOU BEEN KICKED, HIT, SLAPPED, OR OTHERWISE PHYSICALLY HURT BY YOUR PARTNER OR EX-PARTNER?: NO

## 2019-06-11 SDOH — SOCIAL STABILITY: SOCIAL INSECURITY: WITHIN THE LAST YEAR, HAVE YOU BEEN AFRAID OF YOUR PARTNER OR EX-PARTNER?: NO

## 2019-06-11 SDOH — HEALTH STABILITY: MENTAL HEALTH: HOW OFTEN DO YOU HAVE 6 OR MORE DRINKS ON ONE OCCASION?: NEVER

## 2019-06-11 ASSESSMENT — ENCOUNTER SYMPTOMS
PARESTHESIAS: 0
HEADACHES: 0
SORE THROAT: 0
HEARTBURN: 0
WEAKNESS: 0
NERVOUS/ANXIOUS: 0
FREQUENCY: 0
EYE PAIN: 0
DYSURIA: 0
CHILLS: 0
FEVER: 0
ARTHRALGIAS: 1
ABDOMINAL PAIN: 0
NAUSEA: 0
COUGH: 0
DIZZINESS: 0
JOINT SWELLING: 1
DIARRHEA: 0
HEMATOCHEZIA: 0
CONSTIPATION: 0
HEMATURIA: 0
SHORTNESS OF BREATH: 0
PALPITATIONS: 0
MYALGIAS: 0

## 2019-06-11 ASSESSMENT — ACTIVITIES OF DAILY LIVING (ADL): CURRENT_FUNCTION: NO ASSISTANCE NEEDED

## 2019-06-11 ASSESSMENT — MIFFLIN-ST. JEOR: SCORE: 1773.44

## 2019-06-11 NOTE — PROGRESS NOTES
"SUBJECTIVE:   Connor Srinivasan is a 74 year old male who presents for Preventive Visit.    In addition to his preventive visit, we addressed chronic conditions including diabetes, hyperlipidemia, hypertension.     Are you in the first 12 months of your Medicare coverage?  No    Healthy Habits:     In general, how would you rate your overall health?  Excellent    Frequency of exercise:  None    Do you usually eat at least 4 servings of fruit and vegetables a day, include whole grains    & fiber and avoid regularly eating high fat or \"junk\" foods?  No    Taking medications regularly:  Yes    Medication side effects:  Not applicable    Ability to successfully perform activities of daily living:  No assistance needed    Home Safety:  No safety concerns identified    Hearing Impairment:  Need to ask people to speak up or repeat themselves    In the past 6 months, have you been bothered by leaking of urine?  No    In general, how would you rate your overall mental or emotional health?  Excellent      PHQ-2 Total Score: 0    Additional concerns today:  No    Do you feel safe in your environment? Yes    Do you have a Health Care Directive? Yes: Advance Directive has been received and scanned.    Fall risk  Fallen 2 or more times in the past year?: No  Any fall with injury in the past year?: No    Cognitive Screening Patient declines    Do you have sleep apnea, excessive snoring or daytime drowsiness?: no    Hypertension    BP Readings from Last 3 Encounters:   06/11/19 112/64   06/28/18 109/60   05/29/18 130/70     BP stable with atenolol 50 mg and hydrochlorothiazide 25 mg daily.     Hyperlipidemia    Recent Labs   Lab Test 05/29/18  0853 05/26/17  1003  03/19/15  1055 03/26/14  0824   CHOL 118 117   < > 121 121   HDL 28* 26*   < > 36* 33*   LDL 70 69   < > 67 73   TRIG 100 108   < > 88 73   CHOLHDLRATIO  --   --   --  3.4 3.6    < > = values in this interval not displayed.     Taking simvastatin 40 mg daily. "     Diabetes    Lab Results   Component Value Date    A1C 6.1 11/30/2018    A1C 6.6 05/29/2018    A1C 6.3 05/26/2017    A1C 6.7 04/29/2011     Patient shares a log of home glucose levels. Levels average 118 mg/dL and highest level has been 170 mg/dL. He is not currently on any medications for diabetes management.     Weight    Wt Readings from Last 3 Encounters:   06/11/19 104.8 kg (231 lb)   08/15/18 105.7 kg (233 lb)   05/29/18 110.3 kg (243 lb 3.2 oz)     He averages 11,000-12,000 steps a day because he is active at his job.     Past/recent records reviewed and discussed for:  -Reviewed and updated medical hx, medications, social hx, family hx, and immunizations.   -Last colonoscopy was on 6/28/2018--normal. No longer due because of age.   -    Reviewed and updated as needed this visit by clinical staff  Tobacco  Allergies  Meds  Med Hx  Surg Hx  Fam Hx  Soc Hx        Reviewed and updated as needed this visit by Provider  Tobacco  Soc Hx       Social History     Tobacco Use     Smoking status: Never Smoker     Smokeless tobacco: Never Used   Substance Use Topics     Alcohol use: Yes     Frequency: Monthly or less     Drinks per session: 1 or 2     Binge frequency: Never     Comment: rarely     Alcohol Use 6/11/2019   Prescreen: >3 drinks/day or >7 drinks/week? Not Applicable   Prescreen: >3 drinks/day or >7 drinks/week? -     Current providers sharing in care for this patient include:   Patient Care Team:  Roverto Edwards MD as PCP - General  Roverto Edwards MD as Assigned PCP    The following health maintenance items are reviewed in Epic and correct as of today:  Health Maintenance   Topic Date Due     MICROALBUMIN  1945     DIABETIC FOOT EXAM  1945     HEPATITIS C SCREENING  1945     EYE EXAM  1945     ZOSTER IMMUNIZATION (1 of 2) 03/20/1995     AORTIC ANEURYSM SCREENING (SYSTEM ASSIGNED)  03/20/2010     PNEUMOCOCCAL IMMUNIZATION 65+ LOW/MEDIUM RISK (2 of 2 - PPSV23) 05/26/2018  "    BMP  05/29/2019     LIPID  05/29/2019     A1C  05/30/2019     MEDICARE ANNUAL WELLNESS VISIT  05/29/2019     INFLUENZA VACCINE (Season Ended) 09/01/2019     TSH W/FREE T4 REFLEX  05/29/2020     ADVANCED DIRECTIVE PLANNING  05/26/2022     DTAP/TDAP/TD IMMUNIZATION (3 - Td) 04/21/2026     COLONOSCOPY  06/28/2028     PHQ-2  Completed     IPV IMMUNIZATION  Aged Out     MENINGITIS IMMUNIZATION  Aged Out     Labs reviewed in EPIC    Review of Systems   Constitutional: Negative for chills and fever.   HENT: Negative for congestion, ear pain, hearing loss and sore throat.    Eyes: Negative for pain and visual disturbance.   Respiratory: Negative for cough and shortness of breath.    Cardiovascular: Negative for chest pain, palpitations and peripheral edema.   Gastrointestinal: Negative for abdominal pain, constipation, diarrhea, heartburn, hematochezia and nausea.   Genitourinary: Positive for impotence. Negative for dysuria, frequency, genital sores, hematuria and urgency.   Musculoskeletal: Positive for arthralgias and joint swelling. Negative for myalgias.   Skin: Negative for rash.   Neurological: Negative for dizziness, weakness, headaches and paresthesias.   Psychiatric/Behavioral: Negative for mood changes. The patient is not nervous/anxious.      This document serves as a record of the services and decisions personally performed and made by Roverto Edwards MD. It was created on his behalf by Melina Mcdaniels, a trained medical scribe. The creation of this document is based on the provider's statements to the medical scribe.  Melina Mcdaniels June 11, 2019 8:40 AM     OBJECTIVE:   /64 (BP Location: Right arm, Patient Position: Sitting, Cuff Size: Adult Large)   Pulse 73   Temp 97.7  F (36.5  C) (Oral)   Resp 16   Ht 1.745 m (5' 8.7\")   Wt 104.8 kg (231 lb)   SpO2 97%   BMI 34.41 kg/m   Estimated body mass index is 34.41 kg/m  as calculated from the following:    Height as of this encounter: 1.745 m (5' " "8.7\").    Weight as of this encounter: 104.8 kg (231 lb).     Physical Exam  GENERAL: healthy, alert and no distress  EYES: Eyes grossly normal to inspection, PERRL and conjunctivae and sclerae normal  HENT: ear canals and TM's normal, nose and mouth without ulcers or lesions  NECK: no adenopathy, no asymmetry, masses, or scars and thyroid normal to palpation  RESP: lungs clear to auscultation - no rales, rhonchi or wheezes  CV: regular rate and rhythm, normal S1 S2, no S3 or S4, no murmur, click or rub, no peripheral edema and peripheral pulses strong  ABDOMEN: soft, nontender, no hepatosplenomegaly, no masses and bowel sounds normal   (male): normal male genitalia without lesions or urethral discharge, no hernia  RECTAL: normal sphincter tone, no rectal masses, prostate normal size, smooth, nontender without nodules or masses  MS: no gross musculoskeletal defects noted, no edema  SKIN: Venous status changes, left worse than right. . Otherwise, no suspicious lesions or rashes  NEURO: Normal strength and tone, mentation intact and speech normal  PSYCH: mentation appears normal, affect normal/bright  Diabetic foot exam: normal DP and PT pulses, no trophic changes or ulcerative lesions and normal monofilament exam     Diagnostic Test Results:  Labs reviewed in Epic  No results found for this or any previous visit (from the past 24 hour(s)).    ASSESSMENT / PLAN:   (Z00.00) Medicare annual wellness visit, subsequent  (primary encounter diagnosis)  Comment: Stable health. See epic orders.    (E11.9) Type 2 diabetes mellitus without complication, without long-term current use of insulin (H)  Comment: Checking A1c today. Not currently on any medications for diabetes management.   Plan: Hemoglobin A1c, Albumin Random Urine         Quantitative with Creat Ratio, TSH with free T4        reflex, OFFICE/OUTPT VISIT,EST,LEVL III, **A1C         FUTURE 6mo          (I10) Benign essential hypertension  Comment: BP at target. " "Continue current meds.  Plan: atenolol (TENORMIN) 50 MG tablet,         hydrochlorothiazide (HYDRODIURIL) 25 MG tablet,        OFFICE/OUTPT VISIT,EST,LEVL III          (E78.5) Hyperlipidemia LDL goal <130  Comment: Checking LDL today. If within target range, continue current meds  Plan: simvastatin (ZOCOR) 40 MG tablet, Comprehensive        metabolic panel, Lipid panel reflex to direct         LDL Fasting, OFFICE/OUTPT VISIT,EST,LEVL III          (Z13.9) Screening for condition  Comment: Lab work up today  Plan: CBC with platelets          (Z12.5) Screening PSA (prostate specific antigen)  Comment: Screening today  Plan: Prostate spec antigen screen          (N52.9) ERECTILE DYSFUNCTION  Comment: Stable.   Plan: tadalafil (CIALIS) 20 MG tablet            Everything looks fine!    Refills of medications have been faxed to your pharmacy.     I'll get back to you with lab results soon, especially if there is anything of concern.      Please schedule a NON-fasting \"lab only\" appointment for November or December of this year, to update A1c (due every six months).     See me back in a year for your annual exam, or sooner if problems.        End of Life Planning:  Patient currently has an advanced directive: Yes.  Practitioner is supportive of decision.    COUNSELING:  Reviewed preventive health counseling, as reflected in patient instructions       Regular exercise       Healthy diet/nutrition       Colon cancer screening       Prostate cancer screening    Estimated body mass index is 34.41 kg/m  as calculated from the following:    Height as of this encounter: 1.745 m (5' 8.7\").    Weight as of this encounter: 104.8 kg (231 lb).  Weight management plan: Discussed healthy diet and exercise guidelines   reports that he has never smoked. He has never used smokeless tobacco.    Appropriate preventive services were discussed with this patient, including applicable screening as appropriate for cardiovascular disease, diabetes, " osteopenia/osteoporosis, and glaucoma.  As appropriate for age/gender, discussed screening for colorectal cancer, prostate cancer, breast cancer, and cervical cancer. Checklist reviewing preventive services available has been given to the patient.    Reviewed patients plan of care and provided an AVS. The Basic Care Plan (routine screening as documented in Health Maintenance) for Connor meets the Care Plan requirement. This Care Plan has been established and reviewed with the Patient.    Counseling Resources:  ATP IV Guidelines  Pooled Cohorts Equation Calculator  Breast Cancer Risk Calculator  FRAX Risk Assessment  ICSI Preventive Guidelines  Dietary Guidelines for Americans, 2010  USDA's MyPlate  ASA Prophylaxis  Lung CA Screening      The information in this document, created by the medical scribe for me, accurately reflects the services I personally performed and the decisions made by me. I have reviewed and approved this document for accuracy prior to leaving the patient care area.  June 11, 2019 9:03 AM    Roverto Edwards MD, MD  [facility]    Identified Health Risks:

## 2019-06-11 NOTE — PATIENT INSTRUCTIONS
"  Patient Education         Everything looks fine!    Refills of medications have been faxed to your pharmacy.     I'll get back to you with lab results soon, especially if there is anything of concern.      Please schedule a NON-fasting \"lab only\" appointment for November or December of this year, to update A1c (due every six months).     See me back in a year for your annual exam, or sooner if problems.    "

## 2019-06-12 LAB
ALBUMIN SERPL-MCNC: 3.8 G/DL (ref 3.4–5)
ALP SERPL-CCNC: 118 U/L (ref 40–150)
ALT SERPL W P-5'-P-CCNC: 72 U/L (ref 0–70)
ANION GAP SERPL CALCULATED.3IONS-SCNC: 10 MMOL/L (ref 3–14)
AST SERPL W P-5'-P-CCNC: 41 U/L (ref 0–45)
BILIRUB SERPL-MCNC: 0.5 MG/DL (ref 0.2–1.3)
BUN SERPL-MCNC: 21 MG/DL (ref 7–30)
CALCIUM SERPL-MCNC: 8.9 MG/DL (ref 8.5–10.1)
CHLORIDE SERPL-SCNC: 109 MMOL/L (ref 94–109)
CHOLEST SERPL-MCNC: 151 MG/DL
CO2 SERPL-SCNC: 25 MMOL/L (ref 20–32)
CREAT SERPL-MCNC: 0.81 MG/DL (ref 0.66–1.25)
CREAT UR-MCNC: 121 MG/DL
GFR SERPL CREATININE-BSD FRML MDRD: 88 ML/MIN/{1.73_M2}
GLUCOSE SERPL-MCNC: 143 MG/DL (ref 70–99)
HDLC SERPL-MCNC: 30 MG/DL
LDLC SERPL CALC-MCNC: 101 MG/DL
MICROALBUMIN UR-MCNC: 7 MG/L
MICROALBUMIN/CREAT UR: 5.65 MG/G CR (ref 0–17)
NONHDLC SERPL-MCNC: 121 MG/DL
POTASSIUM SERPL-SCNC: 4.5 MMOL/L (ref 3.4–5.3)
PROT SERPL-MCNC: 6.6 G/DL (ref 6.8–8.8)
PSA SERPL-ACNC: 0.89 UG/L (ref 0–4)
SODIUM SERPL-SCNC: 144 MMOL/L (ref 133–144)
TRIGL SERPL-MCNC: 101 MG/DL
TSH SERPL DL<=0.005 MIU/L-ACNC: 2.15 MU/L (ref 0.4–4)

## 2019-09-12 DIAGNOSIS — E11.9 TYPE 2 DIABETES MELLITUS WITHOUT COMPLICATION, WITHOUT LONG-TERM CURRENT USE OF INSULIN (H): ICD-10-CM

## 2019-09-12 NOTE — TELEPHONE ENCOUNTER
"Requested Prescriptions   Pending Prescriptions Disp Refills     TRUE METRIX BLOOD GLUCOSE TEST test strip [Pharmacy Med Name: True Metrix Blood Glucose Test In Vitro Strip] 100 each 0     Sig: USE TO CHECK BLOOD SUGAR ONCE DAILY   Last Written Prescription Date:  03/01/2019  Last Fill Quantity: 100,  # refills: 01   Last office visit: 6/11/2019 with prescribing provider:     Future Office Visit:      Diabetic Supplies Protocol Passed - 9/12/2019  8:44 AM        Passed - Medication is active on med list        Passed - Patient is 18 years of age or older        Passed - Recent (6 mo) or future (30 days) visit within the authorizing provider's specialty     Patient had office visit in the last 6 months or has a visit in the next 30 days with authorizing provider.  See \"Patient Info\" tab in inbasket, or \"Choose Columns\" in Meds & Orders section of the refill encounter.            "

## 2019-09-13 RX ORDER — CALCIUM CITRATE/VITAMIN D3 200MG-6.25
TABLET ORAL
Qty: 100 EACH | Refills: 0 | Status: SHIPPED | OUTPATIENT
Start: 2019-09-13 | End: 2019-12-21

## 2019-09-13 NOTE — TELEPHONE ENCOUNTER
Prescription approved per [facility] Refill Protocol.  Fiordaliza HOLLINS RN

## 2019-10-03 ENCOUNTER — HEALTH MAINTENANCE LETTER (OUTPATIENT)
Age: 74
End: 2019-10-03

## 2019-12-21 ENCOUNTER — MYC REFILL (OUTPATIENT)
Dept: INTERNAL MEDICINE | Facility: CLINIC | Age: 74
End: 2019-12-21

## 2019-12-21 DIAGNOSIS — E11.9 TYPE 2 DIABETES MELLITUS WITHOUT COMPLICATION, WITHOUT LONG-TERM CURRENT USE OF INSULIN (H): ICD-10-CM

## 2019-12-23 NOTE — TELEPHONE ENCOUNTER
"Requested Prescriptions   Pending Prescriptions Disp Refills     blood glucose (TRUE METRIX BLOOD GLUCOSE TEST) test strip  Last Written Prescription Date:  9/13/19  Last Fill Quantity: 100,  # refills: 0   Last office visit: 6/11/2019 with prescribing provider:  Dr. Edwards   Future Office Visit:    100 each 0     Sig: Use to test blood sugar 1 times daily or as directed.       Diabetic Supplies Protocol Failed - 12/21/2019  9:33 PM        Failed - Recent (6 mo) or future (30 days) visit within the authorizing provider's specialty     Patient had office visit in the last 6 months or has a visit in the next 30 days with authorizing provider.  See \"Patient Info\" tab in inbasket, or \"Choose Columns\" in Meds & Orders section of the refill encounter.            Passed - Medication is active on med list        Passed - Patient is 18 years of age or older      Per Dr. Edwards's 6/11/19 office visit note:   Please schedule a NON-fasting \"lab only\" appointment for November or December of this year, to update A1c (due every six months).      See me back in a year for your annual exam, or sooner if problems.     Medication is being filled for 1 time refill only due to:  Patient needs labs to check A1c.   [employer] message sent to patient informing him he is due for labs. Asked him to have A1c drawn before the end of the year if possible.  "

## 2020-02-08 ENCOUNTER — HEALTH MAINTENANCE LETTER (OUTPATIENT)
Age: 75
End: 2020-02-08

## 2020-03-28 ENCOUNTER — MYC REFILL (OUTPATIENT)
Dept: INTERNAL MEDICINE | Facility: CLINIC | Age: 75
End: 2020-03-28

## 2020-03-28 DIAGNOSIS — E11.9 TYPE 2 DIABETES MELLITUS WITHOUT COMPLICATION, WITHOUT LONG-TERM CURRENT USE OF INSULIN (H): ICD-10-CM

## 2020-03-30 RX ORDER — CALCIUM CITRATE/VITAMIN D3 200MG-6.25
TABLET ORAL
Qty: 100 EACH | Refills: 0 | Status: SHIPPED | OUTPATIENT
Start: 2020-03-30 | End: 2020-06-11

## 2020-03-30 NOTE — TELEPHONE ENCOUNTER
"Requested Prescriptions   Pending Prescriptions Disp Refills     blood glucose (TRUE METRIX BLOOD GLUCOSE TEST) test strip 100 each 0     Sig: Use to test blood sugar 1 times daily or as directed.       Diabetic Supplies Protocol Failed - 3/28/2020 11:17 AM        Failed - Recent (6 mo) or future (30 days) visit within the authorizing provider's specialty     Patient had office visit in the last 6 months or has a visit in the next 30 days with authorizing provider.  See \"Patient Info\" tab in inbasket, or \"Choose Columns\" in Meds & Orders section of the refill encounter.            Passed - Medication is active on med list        Passed - Patient is 18 years of age or older             Last office visit 6-11-19    Patient is over-due for A1C check.  A [employer] message was sent to patient 12-23-19 advising him of this.  Did not come in for lab draw.    Another [employer] message sent to patient to have him schedule a lab only appointment.  Future A1C order in chart.        "

## 2020-04-01 DIAGNOSIS — E11.9 TYPE 2 DIABETES MELLITUS WITHOUT COMPLICATION, WITHOUT LONG-TERM CURRENT USE OF INSULIN (H): ICD-10-CM

## 2020-04-01 LAB — HBA1C MFR BLD: 6.2 % (ref 0–5.6)

## 2020-04-01 PROCEDURE — 36415 COLL VENOUS BLD VENIPUNCTURE: CPT | Performed by: INTERNAL MEDICINE

## 2020-04-01 PROCEDURE — 83036 HEMOGLOBIN GLYCOSYLATED A1C: CPT | Performed by: INTERNAL MEDICINE

## 2020-04-28 ENCOUNTER — OFFICE VISIT (OUTPATIENT)
Dept: URGENT CARE | Facility: URGENT CARE | Age: 75
End: 2020-04-28
Payer: COMMERCIAL

## 2020-04-28 VITALS
HEART RATE: 69 BPM | OXYGEN SATURATION: 98 % | SYSTOLIC BLOOD PRESSURE: 108 MMHG | WEIGHT: 237 LBS | HEIGHT: 69 IN | DIASTOLIC BLOOD PRESSURE: 64 MMHG | TEMPERATURE: 98 F | BODY MASS INDEX: 35.1 KG/M2 | RESPIRATION RATE: 18 BRPM

## 2020-04-28 DIAGNOSIS — S09.90XA HEAD INJURY, INITIAL ENCOUNTER: Primary | ICD-10-CM

## 2020-04-28 DIAGNOSIS — S01.01XA LACERATION OF SCALP, INITIAL ENCOUNTER: ICD-10-CM

## 2020-04-28 PROCEDURE — 12001 RPR S/N/AX/GEN/TRNK 2.5CM/<: CPT | Performed by: FAMILY MEDICINE

## 2020-04-28 PROCEDURE — 99213 OFFICE O/P EST LOW 20 MIN: CPT | Mod: 25 | Performed by: FAMILY MEDICINE

## 2020-04-28 ASSESSMENT — MIFFLIN-ST. JEOR: SCORE: 1795.63

## 2020-04-28 ASSESSMENT — PAIN SCALES - GENERAL: PAINLEVEL: NO PAIN (0)

## 2020-04-28 ASSESSMENT — PATIENT HEALTH QUESTIONNAIRE - PHQ9: SUM OF ALL RESPONSES TO PHQ QUESTIONS 1-9: 0

## 2020-04-28 NOTE — PATIENT INSTRUCTIONS
Patient Education     First Aid: Head Injuries  A strong blow to the head may cause swelling and bleeding inside the skull. The resulting pressure can injure the brain (concussion). If you have any doubts about a concussion, have a healthcare provider check the victim.  When to call 911  Call 911 right away if any of the following is true:    The victim loses consciousness or is lethargic.    The victim has convulsions or seizures.    The victim has unequal pupil size. The pupil is the black part in the center of the eye.    The victim shows any of the following signs of concussion:  ? Confusion or inability to follow normal conversation  ? Slurred speech  ? Dizziness or vision problems  ? Nausea or vomiting  ? Muscle weakness or loss of mobility  ? Memory loss  ? Sensitivity to noise    A depressed or spongy area in the skull, or visible bone fragments    Clear fluid draining out of  the ears or nose    Bruising behind the ears or around the eyes  While you wait for help:    Reassure the person.    Treat for shock by maintaining body temperature and keeping the victim calm.    Do rescue breathing or CPR, if needed.  If the person has neck or back pain or is unconscious, he or she might have a spine fracture. Move the person only with great caution and only if absolutely needed.   Step 1. Control bleeding    Apply direct pressure to control bleeding. Wear gloves or use other protection to avoid contact with victim's blood.    Wash a minor surface injury with soap and water after the bleeding stops or is reduced.    Cover the wound with a clean dressing and bandage.  Step 2. Ice bumps and bruises    Place a cold pack or ice on the injury to reduce swelling and pain. Placing a cloth between the skin and the ice pack helps prevent tissue damage from severe cold.  Step 3. Observe the victim    Watch for vomiting or changes in mood or alertness. If you notice changes, call for medical help. Signs of concussion may not  appear for up to 48 hours.    Tell the person's partner, parent, or roommate about the injury so he or she can continue to observe the victim.  Stitches  If a cut is deep or continues to bleed, or the edges of skin don't stay together evenly, the wound may need to be closed with stitches, tape, staples, or medical glue. Any of these can help speed healing and reduce the risk for infection and the size of the scar. These may be especially important concerns with large wounds, and wounds on the head or other visible body parts.  If you think a wound may need medical care, see a healthcare provider as soon as possible. If you need stitches, they must be done in the first few hours. A wound that is not properly closed is at risk for serious infection.  Date Last Reviewed: 12/1/2017 2000-2019 [employer]. [address] [place] [address], [place] [address]. All rights reserved. This information is not intended as a substitute for professional medical care. Always follow your healthcare professional's instructions.

## 2020-04-28 NOTE — PROGRESS NOTES
SUBJECTIVE: Connor Srinivasan is a 75 year old male presenting with a chief complaint of head injury, no LOC/ha and left elbow injury, no pain and from.  Onset of symptoms was 1 hour(s) ago.  Course of illness is same.    Severity mild  Current and Associated symptoms: none  Treatment measures tried include None tried.  Predisposing factors include None.    Past Medical History:   Diagnosis Date     Chronic rhinitis      Localized osteoarthrosis not specified whether primary or secondary, lower leg      Onychomycoses      Other and unspecified hyperlipidemia      Unspecified essential hypertension        Past Surgical History:   Procedure Laterality Date     C TOTAL KNEE ARTHROPLASTY  2004    Knee Replacement, Total (Right)     C TOTAL KNEE ARTHROPLASTY  10/2003    Knee Replacement, Total (Left)     COLONOSCOPY N/A 2018    Procedure: COLONOSCOPY;  Colonoscopy  ;  Surgeon: Benigno Landaverde MD;  Location:  GI     HC COLONOSCOPY THRU STOMA, DIAGNOSTIC  4/3/2006    Normal     HC EXCISION PILONIDAL LESION SIMPLE  1966     PHACOEMULSIFICATION CLEAR CORNEA WITH STANDARD INTRAOCULAR LENS IMPLANT Right 2017    Procedure: PHACOEMULSIFICATION CLEAR CORNEA WITH STANDARD INTRAOCULAR LENS IMPLANT;  RIGHT PHACOEMULSIFICATION CLEAR CORNEA WITH STANDARD INTRAOCULAR LENS IMPLANT ;  Surgeon: John Sanchez MD;  Location: [facility]     PHACOEMULSIFICATION CLEAR CORNEA WITH STANDARD INTRAOCULAR LENS IMPLANT Left 2017    Procedure: PHACOEMULSIFICATION CLEAR CORNEA WITH STANDARD INTRAOCULAR LENS IMPLANT;  LEFT PHACOEMULSIFICATION CLEAR CORNEA WITH STANDARD INTRAOCULAR LENS IMPLANT ;  Surgeon: John Sanchez MD;  Location: [facility]       Family History   Problem Relation Age of Onset     Heart Disease Mother          age 90, had two valves replaced, on coumadin. Hospitalized for GI bleeding in .     C.A.D. Father          in his 80's around .     Arthritis Sister         Born ,  "bilateral knee replacement for arthritis     Colon Cancer No family hx of        Social History     Tobacco Use     Smoking status: Never Smoker     Smokeless tobacco: Never Used   Substance Use Topics     Alcohol use: Yes     Frequency: Monthly or less     Drinks per session: 1 or 2     Binge frequency: Never     Comment: rarely        Allergies   Allergen Reactions     No Known Drug Allergies      Review Of Systems  Skin: as above  Eyes: negative  Ears/Nose/Throat: negative  Respiratory: No shortness of breath, dyspnea on exertion, cough, or hemoptysis  Cardiovascular: negative  Gastrointestinal: negative  Genitourinary: negative  Musculoskeletal: negative  Neurologic: negative for, headaches and local weakness  Psychiatric: negative    OBJECTIVE:  /64 (BP Location: Right arm, Patient Position: Sitting, Cuff Size: Adult Regular)   Pulse 69   Temp 98  F (36.7  C) (Tympanic)   Resp 18   Ht 1.745 m (5' 8.7\")   Wt 107.5 kg (237 lb)   SpO2 98%   BMI 35.31 kg/m  GENERAL APPEARANCE: healthy, alert and no distress  EYES: EOMI,  PERRL, conjunctiva clear  HENT: ear canals and TM's normal.  Nose and mouth without ulcers, erythema or lesions  NECK: supple, nontender, no lymphadenopathy  NEURO: Normal strength and tone, sensory exam grossly normal,  normal speech and mentation  SKIN: bruise left elbow, 2cm lac scalp  CN 2-12 grossly intact    Cleaned wound, 1% lido with epi 1cc and 3 staples placed      ICD-10-CM    1. Head injury, initial encounter  S09.90XA    2. Laceration of scalp, initial encounter  S01.01XA REPAIR SUPERFICIAL, WOUND BODY < =2.5CM     10 days for staple removal  ED if ha, N/V neuro changes  Fluids/Rest, f/u if worse/not any better    "

## 2020-05-08 ENCOUNTER — OFFICE VISIT (OUTPATIENT)
Dept: URGENT CARE | Facility: URGENT CARE | Age: 75
End: 2020-05-08
Payer: COMMERCIAL

## 2020-05-08 VITALS
BODY MASS INDEX: 35.1 KG/M2 | WEIGHT: 237 LBS | SYSTOLIC BLOOD PRESSURE: 120 MMHG | TEMPERATURE: 98.2 F | OXYGEN SATURATION: 98 % | DIASTOLIC BLOOD PRESSURE: 73 MMHG | HEART RATE: 69 BPM | RESPIRATION RATE: 18 BRPM | HEIGHT: 69 IN

## 2020-05-08 DIAGNOSIS — Z48.02 ENCOUNTER FOR STAPLE REMOVAL: Primary | ICD-10-CM

## 2020-05-08 PROCEDURE — 99207 ZZC NO BILLABLE SERVICE THIS VISIT: CPT | Performed by: NURSE PRACTITIONER

## 2020-05-08 ASSESSMENT — ENCOUNTER SYMPTOMS
NAUSEA: 0
DIZZINESS: 0
VOMITING: 0
FEVER: 0
HEADACHES: 0
LIGHT-HEADEDNESS: 0

## 2020-05-08 ASSESSMENT — MIFFLIN-ST. JEOR: SCORE: 1795.63

## 2020-05-08 NOTE — PROGRESS NOTES
SUBJECTIVE:   Connor Srinivasan is a 75 year old male presenting with a chief complaint of   Chief Complaint   Patient presents with     Urgent Care     Follow Up     staples removal        He is an established patient of [facility].    Patient presents for staple removal. Had head injury on 2020. 3 staples were placed at that time. Patient denies any headache, dizziness, lightheaded, vision changes. Denies any redness, swelling, discharge or fevers.     Review of Systems   Constitutional: Negative for fever.   Eyes: Negative for visual disturbance.   Gastrointestinal: Negative for nausea and vomiting.   Neurological: Negative for dizziness, light-headedness and headaches.       Past Medical History:   Diagnosis Date     Chronic rhinitis      Localized osteoarthrosis not specified whether primary or secondary, lower leg      Onychomycoses      Other and unspecified hyperlipidemia      Unspecified essential hypertension      Family History   Problem Relation Age of Onset     Heart Disease Mother          age 90, had two valves replaced, on coumadin. Hospitalized for GI bleeding in .     C.A.D. Father          in his 80's around .     Arthritis Sister         Born 1946, bilateral knee replacement for arthritis     Colon Cancer No family hx of      Current Outpatient Medications   Medication Sig Dispense Refill     ASPIRIN 81 MG OR TABS ONE DAILY 100 3     atenolol (TENORMIN) 50 MG tablet Take 1 tablet (50 mg) by mouth daily 90 tablet 3     blood glucose (TRUE METRIX BLOOD GLUCOSE TEST) test strip Use to test blood sugar 1 times daily or as directed. 100 each 0     Blood Glucose Monitoring Suppl (TRUE METRIX METER) w/Device KIT 1 each daily Use to check blood sugar 1 time daily. 1 kit 0     hydrochlorothiazide (HYDRODIURIL) 25 MG tablet Take 1 tablet (25 mg) by mouth daily 90 tablet 3     simvastatin (ZOCOR) 40 MG tablet Take 1 tablet (40 mg) by mouth At Bedtime 90 tablet 3     tadalafil  "(CIALIS) 20 MG tablet Take one tablet by mouth. TAKE AT LEAST 30 MINUTES BEFORE INTERCOURSE, 20 tablet 11     TRUEPLUS LANCETS 33G MISC 1 each daily 100 each 1     Social History     Tobacco Use     Smoking status: Never Smoker     Smokeless tobacco: Never Used   Substance Use Topics     Alcohol use: Yes     Frequency: Monthly or less     Drinks per session: 1 or 2     Binge frequency: Never     Comment: rarely       OBJECTIVE  /73 (BP Location: Right arm, Patient Position: Sitting, Cuff Size: Adult Regular)   Pulse 69   Temp 98.2  F (36.8  C) (Tympanic)   Resp 18   Ht 1.745 m (5' 8.7\")   Wt 107.5 kg (237 lb)   SpO2 98%   BMI 35.31 kg/m      Physical Exam  Vitals signs and nursing note reviewed.   Constitutional:       Appearance: Normal appearance. He is well-developed and well-groomed.   Skin:     General: Skin is warm and dry.      Comments: Laceration to posterior scalp well healed.    Neurological:      General: No focal deficit present.      Mental Status: He is alert and oriented to person, place, and time.   Psychiatric:         Behavior: Behavior is cooperative.           ASSESSMENT:      ICD-10-CM    1. Encounter for staple removal  Z48.02         Medical Decision Making/PLAN:  Discussed wound healed well. 3 sutures removed. Watch for signs of infection. Can do tylenol and ibuprofen for pain. Patient verbalized understanding and was agreeable to plan.     Followup:    If not improving or if condition worsens, follow up with your Primary Care Provider    Patient Instructions   Watch for signs of infection: increased redness, swelling, discharge, fevers.     Tylenol and ibuprofen to help with pain.         "

## 2020-05-08 NOTE — PATIENT INSTRUCTIONS
Watch for signs of infection: increased redness, swelling, discharge, fevers.     Tylenol and ibuprofen to help with pain.

## 2020-06-07 DIAGNOSIS — I10 BENIGN ESSENTIAL HYPERTENSION: ICD-10-CM

## 2020-06-09 RX ORDER — ATENOLOL 50 MG/1
TABLET ORAL
Qty: 90 TABLET | Refills: 0 | OUTPATIENT
Start: 2020-06-09

## 2020-06-09 NOTE — TELEPHONE ENCOUNTER
Contacted patient, video visit scheduled for 6/11/2020 with Dr. Edwards. Patient has enough medication to last until his appointment. Request denied.

## 2020-06-09 NOTE — TELEPHONE ENCOUNTER
Patient is due for yearly appointment and labs for medication managment.  Please call and assist with scheduling appointment and ask if medication is needed prior to appointment and route back to refill pool.    Caitlyn MACIAS - Registered Nurse  [facility]  Acute and Diagnostic Services

## 2020-06-11 ENCOUNTER — VIRTUAL VISIT (OUTPATIENT)
Dept: INTERNAL MEDICINE | Facility: CLINIC | Age: 75
End: 2020-06-11
Payer: COMMERCIAL

## 2020-06-11 DIAGNOSIS — N52.9 IMPOTENCE OF ORGANIC ORIGIN: ICD-10-CM

## 2020-06-11 DIAGNOSIS — Z79.899 MEDICATION MANAGEMENT: ICD-10-CM

## 2020-06-11 DIAGNOSIS — E78.5 HYPERLIPIDEMIA LDL GOAL <130: ICD-10-CM

## 2020-06-11 DIAGNOSIS — E11.9 TYPE 2 DIABETES MELLITUS WITHOUT COMPLICATION, WITHOUT LONG-TERM CURRENT USE OF INSULIN (H): Primary | ICD-10-CM

## 2020-06-11 DIAGNOSIS — I10 BENIGN ESSENTIAL HYPERTENSION: ICD-10-CM

## 2020-06-11 DIAGNOSIS — Z12.5 SCREENING PSA (PROSTATE SPECIFIC ANTIGEN): ICD-10-CM

## 2020-06-11 PROCEDURE — 99214 OFFICE O/P EST MOD 30 MIN: CPT | Mod: GT | Performed by: INTERNAL MEDICINE

## 2020-06-11 RX ORDER — ATENOLOL 50 MG/1
50 TABLET ORAL DAILY
Qty: 90 TABLET | Refills: 3 | Status: SHIPPED | OUTPATIENT
Start: 2020-06-11 | End: 2021-07-30

## 2020-06-11 RX ORDER — SIMVASTATIN 40 MG
40 TABLET ORAL AT BEDTIME
Qty: 90 TABLET | Refills: 3 | Status: SHIPPED | OUTPATIENT
Start: 2020-06-11 | End: 2021-09-01

## 2020-06-11 RX ORDER — TADALAFIL 20 MG/1
TABLET ORAL
Qty: 20 TABLET | Refills: 11 | Status: SHIPPED | OUTPATIENT
Start: 2020-06-11 | End: 2021-09-01

## 2020-06-11 RX ORDER — CALCIUM CITRATE/VITAMIN D3 200MG-6.25
TABLET ORAL
Qty: 100 EACH | Refills: 0 | Status: SHIPPED | OUTPATIENT
Start: 2020-06-11 | End: 2020-07-29

## 2020-06-11 RX ORDER — HYDROCHLOROTHIAZIDE 25 MG/1
25 TABLET ORAL DAILY
Qty: 90 TABLET | Refills: 3 | Status: SHIPPED | OUTPATIENT
Start: 2020-06-11 | End: 2021-09-01

## 2020-06-11 NOTE — PROGRESS NOTES
"Connor Srinivasan is a 75 year old male who is being evaluated via a billable video visit.      The patient has been notified of following:     \"This video visit will be conducted via a call between you and your physician/provider. We have found that certain health care needs can be provided without the need for an in-person physical exam.  This service lets us provide the care you need with a video conversation.  If a prescription is necessary we can send it directly to your pharmacy.  If lab work is needed we can place an order for that and you can then stop by our lab to have the test done at a later time.    Video visits are billed at different rates depending on your insurance coverage.  Please reach out to your insurance provider with any questions.    If during the course of the call the physician/provider feels a video visit is not appropriate, you will not be charged for this service.\"    Patient has given verbal consent for Video visit? Yes    Will anyone else be joining your video visit? No      Subjective     Connor Srinivasan is a 75 year old male who presents today via video visit for the following health issues:    HPI  Diabetes Follow-up    How often are you checking your blood sugar? One time daily  What time of day are you checking your blood sugars (select all that apply)?  Before meals  Have you had any blood sugars above 200?  No  Have you had any blood sugars below 70?  No    What symptoms do you notice when your blood sugar is low?  None    What concerns do you have today about your diabetes? None     Do you have any of these symptoms? (Select all that apply)  Numbness in feet    Have you had a diabetic eye exam in the last 12 months? No            Hyperlipidemia Follow-Up      Are you regularly taking any medication or supplement to lower your cholesterol?   Yes- SIMVASTATIN    Are you having muscle aches or other side effects that you think could be caused by your cholesterol " lowering medication?  No    Hypertension Follow-up      Do you check your blood pressure regularly outside of the clinic? No     Are you following a low salt diet? No    Are your blood pressures ever more than 140 on the top number (systolic) OR more   than 90 on the bottom number (diastolic), for example 140/90? No    BP Readings from Last 2 Encounters:   05/08/20 120/73   04/28/20 108/64     Hemoglobin A1C (%)   Date Value   04/01/2020 6.2 (H)   06/11/2019 6.1 (H)     LDL Cholesterol Calculated (mg/dL)   Date Value   06/11/2019 101 (H)   05/29/2018 70         How many servings of fruits and vegetables do you eat daily?  0-1    On average, how many sweetened beverages do you drink each day (Examples: soda, juice, sweet tea, etc.  Do NOT count diet or artificially sweetened beverages)?   0    How many days per week do you exercise enough to make your heart beat faster? 3 or less    How many minutes a day do you exercise enough to make your heart beat faster? 9 or less    How many days per week do you miss taking your medication? 0         Video Start Time: 0911    The patient reports checking his glucoses every morning, noting that they average 116.  He has felt well without any recent illnesses.    We reviewed his recent labs, showing his A1c is to be very well controlled.  He continues on Metformin twice daily    His blood pressure was measured to be in normal range on 2 recent urgent care visits in late April and early May, for stapling of his scalp laceration followed later by staple removal.    Past medical, family and social histories as well as medications reviewed and updated as needed.    Reviewed and updated as needed this visit by Provider         Review of Systems   No dyspnea or cough. No chest discomfort, dizziness or palpitations. No diarrhea, abdominal pain or rectal bleeding.   No acute problems with vision or speech, lateralizing weakness or paresthesias.    ROS: as above or negative for  "Respiratory, CV, GI, endocrine, neuro systems.       Objective    There were no vitals taken for this visit.  Estimated body mass index is 35.31 kg/m  as calculated from the following:    Height as of 5/8/20: 1.745 m (5' 8.7\").    Weight as of 5/8/20: 107.5 kg (237 lb).  Physical Exam     GENERAL: Healthy, alert and no distress  EYES: Eyes grossly normal to inspection.  No discharge or erythema, or obvious scleral/conjunctival abnormalities.  RESP: No audible wheeze, cough, or visible cyanosis.  No visible retractions or increased work of breathing.    SKIN: Visible skin clear. No significant rash, abnormal pigmentation or lesions.  NEURO: Cranial nerves grossly intact.  Mentation and speech appropriate for age.  PSYCH: Mentation appears normal, affect normal/bright, judgement and insight intact, normal speech and appearance well-groomed.      Diagnostic Test Results:  Labs reviewed in Epic        Assessment & Plan     (E11.9) Type 2 diabetes mellitus without complication, without long-term current use of insulin (H)  (primary encounter diagnosis)  Comment: A1c shows good control.  Continue current meds.  Advised pursuing eye exam.  Plan: OPHTHALMOLOGY ADULT REFERRAL, blood glucose         (TRUE METRIX BLOOD GLUCOSE TEST) test strip,         Hemoglobin A1c, Albumin Random Urine         Quantitative with Creat Ratio, TSH with free T4        reflex          (I10) Benign essential hypertension  Comment: BP at target. Continue current meds.  Plan: atenolol (TENORMIN) 50 MG tablet,         hydrochlorothiazide (HYDRODIURIL) 25 MG tablet          (E78.5) Hyperlipidemia LDL goal <130  Comment: Last LDL at target. Continue current meds.  Plan: simvastatin (ZOCOR) 40 MG tablet, Comprehensive        metabolic panel, Lipid panel reflex to direct         LDL Fasting          (N52.9) ERECTILE DYSFUNCTION  Comment: Refill Cialis.  Plan: tadalafil (CIALIS) 20 MG tablet          (Z12.5) Screening PSA (prostate specific " "antigen)  Plan: Prostate spec antigen screen          (Z79.899) Medication management  Plan: TSH with free T4 reflex, CBC with platelets           BMI:   Estimated body mass index is 35.31 kg/m  as calculated from the following:    Height as of 5/8/20: 1.745 m (5' 8.7\").    Weight as of 5/8/20: 107.5 kg (237 lb).     Follow-up with me in six months with labs in advance, call or schedule a follow-up appointment sooner if any problems.     Roverto Edwards MD,   [facility]      Video-Visit Details    Type of service:  Video Visit    Video End Time: 0917    Originating Location (pt. Location): Home    Distant Location (provider location):  [facility]     Platform used for Video Visit: Doximity    No follow-ups on file.       Roverto Edwards MD,       "

## 2020-07-14 ENCOUNTER — TRANSFERRED RECORDS (OUTPATIENT)
Dept: HEALTH INFORMATION MANAGEMENT | Facility: CLINIC | Age: 75
End: 2020-07-14

## 2020-07-28 ASSESSMENT — ENCOUNTER SYMPTOMS
HEMATOCHEZIA: 0
CONSTIPATION: 0
NERVOUS/ANXIOUS: 0
SORE THROAT: 0
FEVER: 0
NAUSEA: 0
ARTHRALGIAS: 0
EYE PAIN: 0
HEMATURIA: 0
COUGH: 0
ABDOMINAL PAIN: 0
FREQUENCY: 0
CHILLS: 0
PALPITATIONS: 0
PARESTHESIAS: 0
HEADACHES: 0
DIARRHEA: 0
SHORTNESS OF BREATH: 0
MYALGIAS: 0
WEAKNESS: 0
JOINT SWELLING: 0
DIZZINESS: 0
DYSURIA: 0
HEARTBURN: 0

## 2020-07-28 ASSESSMENT — ACTIVITIES OF DAILY LIVING (ADL): CURRENT_FUNCTION: NO ASSISTANCE NEEDED

## 2020-07-29 ENCOUNTER — OFFICE VISIT (OUTPATIENT)
Dept: INTERNAL MEDICINE | Facility: CLINIC | Age: 75
End: 2020-07-29
Payer: COMMERCIAL

## 2020-07-29 VITALS
SYSTOLIC BLOOD PRESSURE: 126 MMHG | DIASTOLIC BLOOD PRESSURE: 64 MMHG | HEART RATE: 82 BPM | WEIGHT: 224.9 LBS | BODY MASS INDEX: 33.31 KG/M2 | RESPIRATION RATE: 18 BRPM | TEMPERATURE: 97.8 F | HEIGHT: 69 IN | OXYGEN SATURATION: 98 %

## 2020-07-29 DIAGNOSIS — E78.5 HYPERLIPIDEMIA LDL GOAL <130: ICD-10-CM

## 2020-07-29 DIAGNOSIS — Z11.59 NEED FOR HEPATITIS C SCREENING TEST: ICD-10-CM

## 2020-07-29 DIAGNOSIS — Z00.00 ENCOUNTER FOR MEDICARE ANNUAL WELLNESS EXAM: Primary | ICD-10-CM

## 2020-07-29 DIAGNOSIS — Z79.899 MEDICATION MANAGEMENT: ICD-10-CM

## 2020-07-29 DIAGNOSIS — I10 ESSENTIAL HYPERTENSION: ICD-10-CM

## 2020-07-29 DIAGNOSIS — Z12.5 SCREENING PSA (PROSTATE SPECIFIC ANTIGEN): ICD-10-CM

## 2020-07-29 DIAGNOSIS — E11.9 TYPE 2 DIABETES MELLITUS WITHOUT COMPLICATION, WITHOUT LONG-TERM CURRENT USE OF INSULIN (H): ICD-10-CM

## 2020-07-29 LAB
ALBUMIN SERPL-MCNC: 3.8 G/DL (ref 3.4–5)
ALP SERPL-CCNC: 122 U/L (ref 40–150)
ALT SERPL W P-5'-P-CCNC: 75 U/L (ref 0–70)
ANION GAP SERPL CALCULATED.3IONS-SCNC: 7 MMOL/L (ref 3–14)
AST SERPL W P-5'-P-CCNC: 51 U/L (ref 0–45)
BILIRUB SERPL-MCNC: 0.7 MG/DL (ref 0.2–1.3)
BUN SERPL-MCNC: 22 MG/DL (ref 7–30)
CALCIUM SERPL-MCNC: 8.7 MG/DL (ref 8.5–10.1)
CHLORIDE SERPL-SCNC: 108 MMOL/L (ref 94–109)
CHOLEST SERPL-MCNC: 95 MG/DL
CO2 SERPL-SCNC: 24 MMOL/L (ref 20–32)
CREAT SERPL-MCNC: 0.69 MG/DL (ref 0.66–1.25)
CREAT UR-MCNC: 206 MG/DL
ERYTHROCYTE [DISTWIDTH] IN BLOOD BY AUTOMATED COUNT: 13.1 % (ref 10–15)
GFR SERPL CREATININE-BSD FRML MDRD: >90 ML/MIN/{1.73_M2}
GLUCOSE SERPL-MCNC: 124 MG/DL (ref 70–99)
HBA1C MFR BLD: 6.1 % (ref 0–5.6)
HCT VFR BLD AUTO: 48.3 % (ref 40–53)
HDLC SERPL-MCNC: 27 MG/DL
HGB BLD-MCNC: 15.6 G/DL (ref 13.3–17.7)
LDLC SERPL CALC-MCNC: 53 MG/DL
MCH RBC QN AUTO: 31.3 PG (ref 26.5–33)
MCHC RBC AUTO-ENTMCNC: 32.3 G/DL (ref 31.5–36.5)
MCV RBC AUTO: 97 FL (ref 78–100)
MICROALBUMIN UR-MCNC: 28 MG/L
MICROALBUMIN/CREAT UR: 13.45 MG/G CR (ref 0–17)
NONHDLC SERPL-MCNC: 68 MG/DL
PLATELET # BLD AUTO: 103 10E9/L (ref 150–450)
POTASSIUM SERPL-SCNC: 3.6 MMOL/L (ref 3.4–5.3)
PROT SERPL-MCNC: 6.7 G/DL (ref 6.8–8.8)
PSA SERPL-ACNC: 0.67 UG/L (ref 0–4)
RBC # BLD AUTO: 4.99 10E12/L (ref 4.4–5.9)
SODIUM SERPL-SCNC: 139 MMOL/L (ref 133–144)
TRIGL SERPL-MCNC: 75 MG/DL
TSH SERPL DL<=0.005 MIU/L-ACNC: 2.77 MU/L (ref 0.4–4)
WBC # BLD AUTO: 3.6 10E9/L (ref 4–11)

## 2020-07-29 PROCEDURE — 80061 LIPID PANEL: CPT | Performed by: INTERNAL MEDICINE

## 2020-07-29 PROCEDURE — 82043 UR ALBUMIN QUANTITATIVE: CPT | Performed by: INTERNAL MEDICINE

## 2020-07-29 PROCEDURE — 80053 COMPREHEN METABOLIC PANEL: CPT | Performed by: INTERNAL MEDICINE

## 2020-07-29 PROCEDURE — 86803 HEPATITIS C AB TEST: CPT | Performed by: INTERNAL MEDICINE

## 2020-07-29 PROCEDURE — G0103 PSA SCREENING: HCPCS | Performed by: INTERNAL MEDICINE

## 2020-07-29 PROCEDURE — 99397 PER PM REEVAL EST PAT 65+ YR: CPT | Performed by: INTERNAL MEDICINE

## 2020-07-29 PROCEDURE — 36415 COLL VENOUS BLD VENIPUNCTURE: CPT | Performed by: INTERNAL MEDICINE

## 2020-07-29 PROCEDURE — 83036 HEMOGLOBIN GLYCOSYLATED A1C: CPT | Performed by: INTERNAL MEDICINE

## 2020-07-29 PROCEDURE — 99213 OFFICE O/P EST LOW 20 MIN: CPT | Mod: 25 | Performed by: INTERNAL MEDICINE

## 2020-07-29 PROCEDURE — 84443 ASSAY THYROID STIM HORMONE: CPT | Performed by: INTERNAL MEDICINE

## 2020-07-29 PROCEDURE — 85027 COMPLETE CBC AUTOMATED: CPT | Performed by: INTERNAL MEDICINE

## 2020-07-29 RX ORDER — CALCIUM CITRATE/VITAMIN D3 200MG-6.25
TABLET ORAL
Qty: 100 EACH | Refills: 3 | Status: SHIPPED | OUTPATIENT
Start: 2020-07-29 | End: 2021-09-01

## 2020-07-29 ASSESSMENT — ENCOUNTER SYMPTOMS
EYE PAIN: 0
HEMATOCHEZIA: 0
WEAKNESS: 0
DIZZINESS: 0
ARTHRALGIAS: 0
FEVER: 0
SHORTNESS OF BREATH: 0
FREQUENCY: 0
PARESTHESIAS: 0
ABDOMINAL PAIN: 0
CHILLS: 0
NAUSEA: 0
MYALGIAS: 0
COUGH: 0
HEMATURIA: 0
CONSTIPATION: 0
PALPITATIONS: 0
DYSURIA: 0
HEARTBURN: 0
SORE THROAT: 0
JOINT SWELLING: 0
DIARRHEA: 0
HEADACHES: 0
NERVOUS/ANXIOUS: 0

## 2020-07-29 ASSESSMENT — ACTIVITIES OF DAILY LIVING (ADL): CURRENT_FUNCTION: NO ASSISTANCE NEEDED

## 2020-07-29 ASSESSMENT — MIFFLIN-ST. JEOR: SCORE: 1740.75

## 2020-07-29 NOTE — PROGRESS NOTES
"SUBJECTIVE:   Connor Srinivasan is a 75 year old male who presents for Preventive Visit.  Patient is being seen for a medicare wellness visit.    Are you in the first 12 months of your Medicare coverage?  No    Healthy Habits:     In general, how would you rate your overall health?  Good    Frequency of exercise:  None    Do you usually eat at least 4 servings of fruit and vegetables a day, include whole grains    & fiber and avoid regularly eating high fat or \"junk\" foods?  Yes    Taking medications regularly:  Yes    Medication side effects:  None    Ability to successfully perform activities of daily living:  No assistance needed    Home Safety:  No safety concerns identified    Hearing Impairment:  No hearing concerns    In the past 6 months, have you been bothered by leaking of urine?  No    In general, how would you rate your overall mental or emotional health?  Good      PHQ-2 Total Score: 0    Additional concerns today:  No    Do you feel safe in your environment? Yes    Have you ever done Advance Care Planning? (For example, a Health Directive, POLST, or a discussion with a medical provider or your loved ones about your wishes): Yes, advance care planning is on file.      Fall risk  Patient fell twice. They were both accidents.  Both falls were mechanical in nature: one occurred in late March at work, tripped over a cart wheel while walking in a tight space, the other while walking backward and dragging garbage pails behind him, missed a step.     Cognitive Screening   1) Repeat 3 items (Leader, Season, Table)    2) Clock draw: NORMAL  3) 3 item recall: Recalls 3 objects  Results: Normal clock, 3 objects    Mini-CogTM Copyright BUDDY Grullon. Licensed by the author for use in [facility]; reprinted with permission (zoey@.[facility]). All rights reserved.      Do you have sleep apnea, excessive snoring or daytime drowsiness?: no    Reviewed and updated as needed this visit by clinical staff     "     Reviewed and updated as needed this visit by Provider        Social History     Tobacco Use     Smoking status: Never Smoker     Smokeless tobacco: Never Used   Substance Use Topics     Alcohol use: Yes     Frequency: Monthly or less     Drinks per session: 1 or 2     Binge frequency: Never     Comment: rarely         Alcohol Use 7/28/2020   Prescreen: >3 drinks/day or >7 drinks/week? No   Prescreen: >3 drinks/day or >7 drinks/week? -               Current providers sharing in care for this patient include:   Patient Care Team:  Roverto Edwards MD as PCP - General  Roverto Edwards MD as Assigned PCP    The following health maintenance items are reviewed in Epic and correct as of today:  Health Maintenance   Topic Date Due     DIABETIC FOOT EXAM  1945     HEPATITIS C SCREENING  1945     HEPATITIS B IMMUNIZATION (1 of 3 - Risk 3-dose series) 03/20/1964     ZOSTER IMMUNIZATION (1 of 2) 03/20/1995     AORTIC ANEURYSM SCREENING (SYSTEM ASSIGNED)  03/20/2010     MEDICARE ANNUAL WELLNESS VISIT  06/11/2020     BMP  06/11/2020     LIPID  06/11/2020     MICROALBUMIN  06/11/2020     FALL RISK ASSESSMENT  06/11/2020     INFLUENZA VACCINE (1) 09/01/2020     A1C  10/01/2020     EYE EXAM  07/14/2021     ADVANCE CARE PLANNING  05/26/2022     DTAP/TDAP/TD IMMUNIZATION (3 - Td) 04/21/2026     COLORECTAL CANCER SCREENING  06/28/2028     PHQ-2  Completed     PNEUMOCOCCAL IMMUNIZATION 65+ LOW/MEDIUM RISK  Completed     IPV IMMUNIZATION  Aged Out     MENINGITIS IMMUNIZATION  Aged Out       Pneumonia Vaccine:pneumonia vaccinations up to date.     Review of Systems   Constitutional: Negative for chills and fever.   HENT: Negative for congestion, ear pain, hearing loss and sore throat.    Eyes: Negative for pain and visual disturbance.   Respiratory: Negative for cough and shortness of breath.    Cardiovascular: Negative for chest pain, palpitations and peripheral edema.   Gastrointestinal: Negative for abdominal pain,  "constipation, diarrhea, heartburn, hematochezia and nausea.   Genitourinary: Positive for impotence. Negative for dysuria, frequency, genital sores, hematuria and urgency.   Musculoskeletal: Negative for arthralgias, joint swelling and myalgias.   Skin: Negative for rash.   Neurological: Negative for dizziness, weakness, headaches and paresthesias.   Psychiatric/Behavioral: Negative for mood changes. The patient is not nervous/anxious.      REVIEW OF SYSTEMS: The following systems have been completely reviewed and are negative except as noted above:   Constitutional, HEENT, respiratory, cardiovascular, gastrointestinal, genitourinary, musculoskeletal, dermatologic, hematologic, endocrine, psychiatric, and neurologic systems.      OBJECTIVE:   Physical Exam     Vitals: /64   Pulse 82   Temp 97.8  F (36.6  C) (Oral)   Resp 18   Ht 1.745 m (5' 8.7\")   Wt 102 kg (224 lb 14.4 oz)   SpO2 98%   BMI 33.50 kg/m    BMI= Body mass index is 33.5 kg/m .     GENERAL: healthy, alert and no distress  EYES: Eyes grossly normal to inspection, PERRL and conjunctivae and sclerae normal  HENT: ear canals and TM's normal, nose and mouth without ulcers or lesions  NECK: no adenopathy, no asymmetry, masses, or scars and thyroid normal to palpation  RESP: lungs clear to auscultation - no rales, rhonchi or wheezes  CV: regular rate and rhythm, normal S1 S2, no S3 or S4, no murmur, click or rub, no peripheral edema and peripheral pulses strong  ABDOMEN: soft, nontender, no hepatosplenomegaly, no masses and bowel sounds normal   (male)/Rectal: not performed  MS: no gross musculoskeletal defects noted, no edema  SKIN: no suspicious lesions or rashes  NEURO: Normal strength and tone, mentation intact and speech normal  PSYCH: mentation appears normal, affect normal/bright  Diabetic foot exam: normal DP and PT pulses, no trophic changes or ulcerative lesions and normal sensory exam    Diagnostic Test Results:  Labs reviewed in " "Epic    ASSESSMENT / PLAN:   (Z00.00) Encounter for Medicare annual wellness exam  (primary encounter diagnosis)  Comment: Stable health. See epic orders.     (E11.9) Type 2 diabetes mellitus without complication, without long-term current use of insulin (H)  Comment: Well controlled. Continue current non-Rx measures. Update A1c.   Plan: blood glucose (TRUE METRIX BLOOD GLUCOSE TEST)         test strip, OFFICE/OUTPT VISIT,EST,LEVL III          (E78.5) Hyperlipidemia LDL goal <130  Comment: Previous LDL at target. Continue current meds. Update lipids.   Plan: OFFICE/OUTPT VISIT,EST,LEVL III          (I10) Essential hypertension  Comment: BP at target. Continue current meds.   Plan: OFFICE/OUTPT VISIT,EST,LEVL III          (Z12.5) Screening PSA (prostate specific antigen)  PSA ordered.     (Z79.429) Medication management      COUNSELING:  Reviewed preventive health counseling, as reflected in patient instructions    Estimated body mass index is 35.31 kg/m  as calculated from the following:    Height as of 5/8/20: 1.745 m (5' 8.7\").    Weight as of 5/8/20: 107.5 kg (237 lb).    Weight management plan: Discussed healthy diet and exercise guidelines     reports that he has never smoked. He has never used smokeless tobacco.      Appropriate preventive services were discussed with this patient, including applicable screening as appropriate for cardiovascular disease, diabetes, osteopenia/osteoporosis, and glaucoma.  As appropriate for age/gender, discussed screening for colorectal cancer, prostate cancer, breast cancer, and cervical cancer. Checklist reviewing preventive services available has been given to the patient.    Reviewed patients plan of care and provided an AVS. The Basic Care Plan (routine screening as documented in Health Maintenance) for Connor meets the Care Plan requirement. This Care Plan has been established and reviewed with the Patient.    Counseling Resources:  ATP IV Guidelines  Pooled Cohorts " Equation Calculator  Breast Cancer Risk Calculator  FRAX Risk Assessment  ICSI Preventive Guidelines  Dietary Guidelines for Americans, 2010  [employer]'s MyPlate  ASA Prophylaxis  Lung CA Screening    Roverto Edwards MD,   [facility]    Identified Health Risks:

## 2020-07-29 NOTE — PATIENT INSTRUCTIONS
"  Patient Education   Personalized Prevention Plan  You are due for the preventive services outlined below.  Your care team is available to assist you in scheduling these services.  If you have already completed any of these items, please share that information with your care team to update in your medical record.  Health Maintenance Due   Topic Date Due     Diabetic Foot Exam  1945     Hepatitis C Screening  1945     Hepatitis B Vaccine (1 of 3 - Risk 3-dose series) 03/20/1964     Zoster (Shingles) Vaccine (1 of 2) 03/20/1995     AORTIC ANEURYSM SCREENING (SYSTEM ASSIGNED)  03/20/2010     Annual Wellness Visit  06/11/2020     Basic Metabolic Panel  06/11/2020     Cholesterol Lab  06/11/2020     Kidney Microalbumin Urine Test  06/11/2020     FALL RISK ASSESSMENT  06/11/2020           Everything looks fine!    Refills of medications have been faxed to your pharmacy.     I'll get back to you with lab results soon, especially if there is anything of concern.      See me for a video visit for diabetes in six months (schedule a \"lab only\" appointment for your A1c a few days in advance).    Call sooner if problems.    "

## 2020-07-30 LAB — HCV AB SERPL QL IA: NONREACTIVE

## 2020-08-07 ENCOUNTER — HOSPITAL PATHOLOGY (OUTPATIENT)
Dept: OTHER | Facility: CLINIC | Age: 75
End: 2020-08-07

## 2020-08-11 LAB — COPATH REPORT: NORMAL

## 2021-01-28 ENCOUNTER — IMMUNIZATION (OUTPATIENT)
Dept: NURSING | Facility: CLINIC | Age: 76
End: 2021-01-28
Payer: COMMERCIAL

## 2021-01-28 PROCEDURE — 91300 PR COVID VAC PFIZER DIL RECON 30 MCG/0.3 ML IM: CPT

## 2021-01-28 PROCEDURE — 0001A PR COVID VAC PFIZER DIL RECON 30 MCG/0.3 ML IM: CPT

## 2021-02-18 ENCOUNTER — IMMUNIZATION (OUTPATIENT)
Dept: NURSING | Facility: CLINIC | Age: 76
End: 2021-02-18
Attending: PHYSICIAN ASSISTANT
Payer: COMMERCIAL

## 2021-02-18 PROCEDURE — 0002A PR COVID VAC PFIZER DIL RECON 30 MCG/0.3 ML IM: CPT

## 2021-02-18 PROCEDURE — 91300 PR COVID VAC PFIZER DIL RECON 30 MCG/0.3 ML IM: CPT

## 2021-03-21 ENCOUNTER — HEALTH MAINTENANCE LETTER (OUTPATIENT)
Age: 76
End: 2021-03-21

## 2021-07-21 ENCOUNTER — OFFICE VISIT (OUTPATIENT)
Dept: URGENT CARE | Facility: URGENT CARE | Age: 76
End: 2021-07-21
Payer: COMMERCIAL

## 2021-07-21 VITALS
OXYGEN SATURATION: 97 % | RESPIRATION RATE: 22 BRPM | TEMPERATURE: 97.4 F | HEART RATE: 57 BPM | SYSTOLIC BLOOD PRESSURE: 120 MMHG | DIASTOLIC BLOOD PRESSURE: 73 MMHG

## 2021-07-21 DIAGNOSIS — R21 RASH: ICD-10-CM

## 2021-07-21 DIAGNOSIS — L03.116 CELLULITIS OF LEFT LOWER EXTREMITY: Primary | ICD-10-CM

## 2021-07-21 DIAGNOSIS — E11.9 TYPE 2 DIABETES MELLITUS WITHOUT COMPLICATION, WITHOUT LONG-TERM CURRENT USE OF INSULIN (H): ICD-10-CM

## 2021-07-21 PROCEDURE — 99214 OFFICE O/P EST MOD 30 MIN: CPT | Performed by: FAMILY MEDICINE

## 2021-07-21 RX ORDER — TRIAMCINOLONE ACETONIDE 1 MG/G
CREAM TOPICAL 2 TIMES DAILY
Qty: 45 G | Refills: 1 | Status: SHIPPED | OUTPATIENT
Start: 2021-07-21 | End: 2021-08-20

## 2021-07-21 RX ORDER — CEPHALEXIN 500 MG/1
500 CAPSULE ORAL 3 TIMES DAILY
Qty: 30 CAPSULE | Refills: 0 | Status: SHIPPED | OUTPATIENT
Start: 2021-07-21 | End: 2021-07-31

## 2021-07-21 NOTE — PROGRESS NOTES
SUBJECTIVE: Connor Srinivasan is a 76 year old male presenting with a chief complaint of left leg redness with chronic rash.  Onset of symptoms was day(s) ago.  Course of illness is worsening.    Predisposing factors include diabetes.    Past Medical History:   Diagnosis Date     Chronic rhinitis      Localized osteoarthrosis not specified whether primary or secondary, lower leg      Onychomycoses      Other and unspecified hyperlipidemia      Unspecified essential hypertension      Allergies   Allergen Reactions     No Known Drug Allergies      Social History     Tobacco Use     Smoking status: Never Smoker     Smokeless tobacco: Never Used   Substance Use Topics     Alcohol use: Yes     Comment: rarely       ROS:  SKIN: no rash  GI: no vomiting    OBJECTIVE:  /73   Pulse 57   Temp 97.4  F (36.3  C) (Tympanic)   Resp 22   SpO2 97% GENERAL APPEARANCE: healthy, alert and no distress  SKIN: dry patchy area left lower ext with some redness      ICD-10-CM    1. Cellulitis of left lower extremity  L03.116 cephALEXin (KEFLEX) 500 MG capsule   2. Type 2 diabetes mellitus without complication, without long-term current use of insulin (H)  E11.9    3. Rash  R21 triamcinolone (KENALOG) 0.1 % external cream     Use moisturizers   Fluids/Rest, f/u if worse/not any better

## 2021-08-29 ASSESSMENT — ENCOUNTER SYMPTOMS
PARESTHESIAS: 0
DIZZINESS: 0
NERVOUS/ANXIOUS: 0
SHORTNESS OF BREATH: 0
CHILLS: 0
FEVER: 0
HEARTBURN: 0
NAUSEA: 0
DYSURIA: 0
EYE PAIN: 0
CONSTIPATION: 0
DIARRHEA: 0
WEAKNESS: 0
HEMATURIA: 0
SORE THROAT: 0
FREQUENCY: 0
PALPITATIONS: 0
ARTHRALGIAS: 0
MYALGIAS: 0
HEMATOCHEZIA: 0
ABDOMINAL PAIN: 0
COUGH: 0
HEADACHES: 0
JOINT SWELLING: 0

## 2021-08-29 ASSESSMENT — ACTIVITIES OF DAILY LIVING (ADL): CURRENT_FUNCTION: NO ASSISTANCE NEEDED

## 2021-08-31 ENCOUNTER — OFFICE VISIT (OUTPATIENT)
Dept: URGENT CARE | Facility: URGENT CARE | Age: 76
End: 2021-08-31
Payer: COMMERCIAL

## 2021-08-31 VITALS
BODY MASS INDEX: 34.26 KG/M2 | TEMPERATURE: 97.9 F | DIASTOLIC BLOOD PRESSURE: 62 MMHG | RESPIRATION RATE: 22 BRPM | SYSTOLIC BLOOD PRESSURE: 118 MMHG | HEART RATE: 64 BPM | OXYGEN SATURATION: 97 % | WEIGHT: 230 LBS

## 2021-08-31 DIAGNOSIS — S61.012A THUMB LACERATION, LEFT, INITIAL ENCOUNTER: Primary | ICD-10-CM

## 2021-08-31 PROCEDURE — 12001 RPR S/N/AX/GEN/TRNK 2.5CM/<: CPT | Performed by: FAMILY MEDICINE

## 2021-08-31 NOTE — PROGRESS NOTES
SUBJECTIVE: @[email or web address]@.ident who presents to the clinic with a laceration on the thumb sustained 1 day(s) ago.    This is a non-work related and accidental injury.    Mechanism of injury: knife.  Associated symptoms: Denies numbness, weakness, or loss of function  Last tetanus booster within 10 years: yes    Past Medical History:   Diagnosis Date     Chronic rhinitis      Localized osteoarthrosis not specified whether primary or secondary, lower leg      Onychomycoses      Other and unspecified hyperlipidemia      Unspecified essential hypertension      Allergies   Allergen Reactions     No Known Drug Allergies      Social History     Tobacco Use     Smoking status: Never Smoker     Smokeless tobacco: Never Used   Substance Use Topics     Alcohol use: Yes     Comment: rarely       ROS:  Neuro: good distal sensation  Motor: normal rom and strenght  Hem: capillary refill < 2 sec    EXAM: The patient appears today in alert,no apparent distress distressVITALS:   /62   Pulse 64   Temp 97.9  F (36.6  C)   Resp 22   Wt 104.3 kg (230 lb)   SpO2 97%   BMI 34.26 kg/m    Size of laceration: 2 centimeters  Characteristics of the laceration: clean and straight  Tendon function intact: yes  Sensation to light touch intact: yes  Pulses/Capillary refill intact: yes      ICD-10-CM    1. Thumb laceration, left, initial encounter  S61.012A REPAIR SUPERFICIAL, WOUND BODY < =2.5CM       Procedure Note:Wound injected with 1 cc's of Lidocaine 1% plain  Good anesthesia was obtainedPrepped and draped in the usual sterile fashion  Wound cleaned with sterile water  Wound soakedLaceration was closed using 3 4-0 nylon interrupted sutures  After care instructions:Keep wound clean   Sutures out in 10 days  Signs of infection discussed today

## 2021-09-01 ENCOUNTER — OFFICE VISIT (OUTPATIENT)
Dept: INTERNAL MEDICINE | Facility: CLINIC | Age: 76
End: 2021-09-01
Payer: COMMERCIAL

## 2021-09-01 ENCOUNTER — MYC MEDICAL ADVICE (OUTPATIENT)
Dept: INTERNAL MEDICINE | Facility: CLINIC | Age: 76
End: 2021-09-01

## 2021-09-01 ENCOUNTER — TELEPHONE (OUTPATIENT)
Dept: OTHER | Facility: CLINIC | Age: 76
End: 2021-09-01

## 2021-09-01 VITALS
RESPIRATION RATE: 16 BRPM | HEIGHT: 69 IN | DIASTOLIC BLOOD PRESSURE: 64 MMHG | HEART RATE: 71 BPM | BODY MASS INDEX: 33.18 KG/M2 | TEMPERATURE: 98 F | OXYGEN SATURATION: 97 % | SYSTOLIC BLOOD PRESSURE: 120 MMHG | WEIGHT: 224 LBS

## 2021-09-01 DIAGNOSIS — T16.2XXA ACUTE FOREIGN BODY OF LEFT EAR CANAL, INITIAL ENCOUNTER: ICD-10-CM

## 2021-09-01 DIAGNOSIS — Z79.899 MEDICATION MANAGEMENT: ICD-10-CM

## 2021-09-01 DIAGNOSIS — N52.9 IMPOTENCE OF ORGANIC ORIGIN: ICD-10-CM

## 2021-09-01 DIAGNOSIS — E78.5 HYPERLIPIDEMIA LDL GOAL <130: ICD-10-CM

## 2021-09-01 DIAGNOSIS — I10 ESSENTIAL HYPERTENSION: ICD-10-CM

## 2021-09-01 DIAGNOSIS — Z12.5 SCREENING PSA (PROSTATE SPECIFIC ANTIGEN): ICD-10-CM

## 2021-09-01 DIAGNOSIS — L03.119 RECURRENT CELLULITIS OF LOWER EXTREMITY: ICD-10-CM

## 2021-09-01 DIAGNOSIS — Z00.00 ENCOUNTER FOR MEDICARE ANNUAL WELLNESS EXAM: Primary | ICD-10-CM

## 2021-09-01 DIAGNOSIS — E11.9 TYPE 2 DIABETES MELLITUS WITHOUT COMPLICATION, WITHOUT LONG-TERM CURRENT USE OF INSULIN (H): ICD-10-CM

## 2021-09-01 PROBLEM — E66.01 MORBID OBESITY, UNSPECIFIED OBESITY TYPE (H): Status: RESOLVED | Noted: 2017-05-28 | Resolved: 2021-09-01

## 2021-09-01 LAB
ALBUMIN SERPL-MCNC: 3.4 G/DL (ref 3.4–5)
ALP SERPL-CCNC: 122 U/L (ref 40–150)
ALT SERPL W P-5'-P-CCNC: 66 U/L (ref 0–70)
ANION GAP SERPL CALCULATED.3IONS-SCNC: 8 MMOL/L (ref 3–14)
AST SERPL W P-5'-P-CCNC: 36 U/L (ref 0–45)
BILIRUB SERPL-MCNC: 0.9 MG/DL (ref 0.2–1.3)
BUN SERPL-MCNC: 18 MG/DL (ref 7–30)
CALCIUM SERPL-MCNC: 8.4 MG/DL (ref 8.5–10.1)
CHLORIDE BLD-SCNC: 108 MMOL/L (ref 94–109)
CHOLEST SERPL-MCNC: 111 MG/DL
CO2 SERPL-SCNC: 23 MMOL/L (ref 20–32)
CREAT SERPL-MCNC: 0.76 MG/DL (ref 0.66–1.25)
CREAT UR-MCNC: 125 MG/DL
ERYTHROCYTE [DISTWIDTH] IN BLOOD BY AUTOMATED COUNT: 13.4 % (ref 10–15)
FASTING STATUS PATIENT QL REPORTED: YES
GFR SERPL CREATININE-BSD FRML MDRD: 89 ML/MIN/1.73M2
GLUCOSE BLD-MCNC: 104 MG/DL (ref 70–99)
HBA1C MFR BLD: 6.3 % (ref 0–5.6)
HCT VFR BLD AUTO: 45.8 % (ref 40–53)
HDLC SERPL-MCNC: 26 MG/DL
HGB BLD-MCNC: 14.9 G/DL (ref 13.3–17.7)
LDLC SERPL CALC-MCNC: 67 MG/DL
MCH RBC QN AUTO: 31.2 PG (ref 26.5–33)
MCHC RBC AUTO-ENTMCNC: 32.5 G/DL (ref 31.5–36.5)
MCV RBC AUTO: 96 FL (ref 78–100)
MICROALBUMIN UR-MCNC: 12 MG/L
MICROALBUMIN/CREAT UR: 9.6 MG/G CR (ref 0–17)
NONHDLC SERPL-MCNC: 85 MG/DL
PLATELET # BLD AUTO: 107 10E3/UL (ref 150–450)
POTASSIUM BLD-SCNC: 3.4 MMOL/L (ref 3.4–5.3)
PROT SERPL-MCNC: 6.4 G/DL (ref 6.8–8.8)
PSA SERPL-MCNC: 0.72 UG/L (ref 0–4)
RBC # BLD AUTO: 4.78 10E6/UL (ref 4.4–5.9)
SODIUM SERPL-SCNC: 139 MMOL/L (ref 133–144)
T4 FREE SERPL-MCNC: 0.97 NG/DL (ref 0.76–1.46)
TRIGL SERPL-MCNC: 88 MG/DL
TSH SERPL DL<=0.005 MIU/L-ACNC: 4.85 MU/L (ref 0.4–4)
WBC # BLD AUTO: 3.3 10E3/UL (ref 4–11)

## 2021-09-01 PROCEDURE — 85027 COMPLETE CBC AUTOMATED: CPT | Performed by: INTERNAL MEDICINE

## 2021-09-01 PROCEDURE — 82043 UR ALBUMIN QUANTITATIVE: CPT | Performed by: INTERNAL MEDICINE

## 2021-09-01 PROCEDURE — 84439 ASSAY OF FREE THYROXINE: CPT | Performed by: INTERNAL MEDICINE

## 2021-09-01 PROCEDURE — 80061 LIPID PANEL: CPT | Performed by: INTERNAL MEDICINE

## 2021-09-01 PROCEDURE — 99214 OFFICE O/P EST MOD 30 MIN: CPT | Mod: 25 | Performed by: INTERNAL MEDICINE

## 2021-09-01 PROCEDURE — 80053 COMPREHEN METABOLIC PANEL: CPT | Performed by: INTERNAL MEDICINE

## 2021-09-01 PROCEDURE — 84443 ASSAY THYROID STIM HORMONE: CPT | Performed by: INTERNAL MEDICINE

## 2021-09-01 PROCEDURE — 36415 COLL VENOUS BLD VENIPUNCTURE: CPT | Performed by: INTERNAL MEDICINE

## 2021-09-01 PROCEDURE — G0103 PSA SCREENING: HCPCS | Performed by: INTERNAL MEDICINE

## 2021-09-01 PROCEDURE — 83036 HEMOGLOBIN GLYCOSYLATED A1C: CPT | Performed by: INTERNAL MEDICINE

## 2021-09-01 PROCEDURE — 99397 PER PM REEVAL EST PAT 65+ YR: CPT | Performed by: INTERNAL MEDICINE

## 2021-09-01 RX ORDER — TADALAFIL 20 MG/1
TABLET ORAL
Qty: 20 TABLET | Refills: 11 | Status: SHIPPED | OUTPATIENT
Start: 2021-09-01 | End: 2022-09-07

## 2021-09-01 RX ORDER — SIMVASTATIN 40 MG
40 TABLET ORAL AT BEDTIME
Qty: 90 TABLET | Refills: 3 | Status: SHIPPED | OUTPATIENT
Start: 2021-09-01 | End: 2022-09-07

## 2021-09-01 RX ORDER — HYDROCHLOROTHIAZIDE 25 MG/1
25 TABLET ORAL DAILY
Qty: 90 TABLET | Refills: 3 | Status: SHIPPED | OUTPATIENT
Start: 2021-09-01 | End: 2022-07-29

## 2021-09-01 RX ORDER — CALCIUM CITRATE/VITAMIN D3 200MG-6.25
TABLET ORAL
Qty: 100 STRIP | Refills: 4 | Status: SHIPPED | OUTPATIENT
Start: 2021-09-01 | End: 2022-10-19

## 2021-09-01 RX ORDER — TRIAMCINOLONE ACETONIDE 1 MG/G
CREAM TOPICAL 2 TIMES DAILY
COMMUNITY
End: 2022-09-07

## 2021-09-01 RX ORDER — ATENOLOL 50 MG/1
50 TABLET ORAL DAILY
Qty: 90 TABLET | Refills: 3 | Status: SHIPPED | OUTPATIENT
Start: 2021-09-01 | End: 2022-09-07

## 2021-09-01 SDOH — HEALTH STABILITY: PHYSICAL HEALTH: ON AVERAGE, HOW MANY MINUTES DO YOU ENGAGE IN EXERCISE AT THIS LEVEL?: 60 MIN

## 2021-09-01 SDOH — ECONOMIC STABILITY: INCOME INSECURITY: IN THE LAST 12 MONTHS, WAS THERE A TIME WHEN YOU WERE NOT ABLE TO PAY THE MORTGAGE OR RENT ON TIME?: NO

## 2021-09-01 SDOH — ECONOMIC STABILITY: FOOD INSECURITY: WITHIN THE PAST 12 MONTHS, THE FOOD YOU BOUGHT JUST DIDN'T LAST AND YOU DIDN'T HAVE MONEY TO GET MORE.: NEVER TRUE

## 2021-09-01 SDOH — HEALTH STABILITY: PHYSICAL HEALTH: ON AVERAGE, HOW MANY DAYS PER WEEK DO YOU ENGAGE IN MODERATE TO STRENUOUS EXERCISE (LIKE A BRISK WALK)?: 5 DAYS

## 2021-09-01 SDOH — ECONOMIC STABILITY: FOOD INSECURITY: WITHIN THE PAST 12 MONTHS, YOU WORRIED THAT YOUR FOOD WOULD RUN OUT BEFORE YOU GOT MONEY TO BUY MORE.: NEVER TRUE

## 2021-09-01 ASSESSMENT — ENCOUNTER SYMPTOMS
CHILLS: 0
WEAKNESS: 0
CONSTIPATION: 0
DYSURIA: 0
FEVER: 0
HEMATURIA: 0
NAUSEA: 0
MYALGIAS: 0
ABDOMINAL PAIN: 0
ARTHRALGIAS: 0
DIARRHEA: 0
EYE PAIN: 0
SORE THROAT: 0
FREQUENCY: 0
PALPITATIONS: 0
PARESTHESIAS: 0
SHORTNESS OF BREATH: 0
HEARTBURN: 0
DIZZINESS: 0
HEMATOCHEZIA: 0
COUGH: 0
NERVOUS/ANXIOUS: 0
JOINT SWELLING: 0
HEADACHES: 0

## 2021-09-01 ASSESSMENT — LIFESTYLE VARIABLES
HOW MANY STANDARD DRINKS CONTAINING ALCOHOL DO YOU HAVE ON A TYPICAL DAY: 1 OR 2
HOW OFTEN DO YOU HAVE A DRINK CONTAINING ALCOHOL: MONTHLY OR LESS
HOW OFTEN DO YOU HAVE SIX OR MORE DRINKS ON ONE OCCASION: NEVER

## 2021-09-01 ASSESSMENT — MIFFLIN-ST. JEOR: SCORE: 1728.56

## 2021-09-01 ASSESSMENT — SOCIAL DETERMINANTS OF HEALTH (SDOH)
WITHIN THE LAST YEAR, HAVE TO BEEN RAPED OR FORCED TO HAVE ANY KIND OF SEXUAL ACTIVITY BY YOUR PARTNER OR EX-PARTNER?: NO
WITHIN THE LAST YEAR, HAVE YOU BEEN HUMILIATED OR EMOTIONALLY ABUSED IN OTHER WAYS BY YOUR PARTNER OR EX-PARTNER?: NO
WITHIN THE LAST YEAR, HAVE YOU BEEN KICKED, HIT, SLAPPED, OR OTHERWISE PHYSICALLY HURT BY YOUR PARTNER OR EX-PARTNER?: NO
HOW HARD IS IT FOR YOU TO PAY FOR THE VERY BASICS LIKE FOOD, HOUSING, MEDICAL CARE, AND HEATING?: NOT HARD AT ALL
WITHIN THE LAST YEAR, HAVE YOU BEEN AFRAID OF YOUR PARTNER OR EX-PARTNER?: NO

## 2021-09-01 ASSESSMENT — PAIN SCALES - GENERAL: PAINLEVEL: NO PAIN (0)

## 2021-09-01 ASSESSMENT — ACTIVITIES OF DAILY LIVING (ADL): CURRENT_FUNCTION: NO ASSISTANCE NEEDED

## 2021-09-01 NOTE — TELEPHONE ENCOUNTER
Referred to [facility] by Roverto Edwards MD for Recurrent cellulitis vs. Venous insufficiency in lower extremities of diabetic patient.    Pt needs to be scheduled for new patient consult with Vascular Medicine.  Will route to scheduling to coordinate an appointment at next available.    Marva Olson, RICHELLEN, RN  [facility]

## 2021-09-01 NOTE — PATIENT INSTRUCTIONS
"Someone will contact you to help you to schedule an office consult with the \"Vascular Medicine\" specialist, regarding the \"venous insufficiency\" and recurrent cellulitis.     Everything looks fine!    Refills of medications have been faxed to your pharmacy.     Lab results will be available soon on [facility].    See me in a year, sooner if problems.       "

## 2021-09-01 NOTE — PROGRESS NOTES
"SUBJECTIVE:   Connor Srinivasan is a 76 year old male who presents for Preventive Visit.      Patient has been advised of split billing requirements and indicates understanding: Yes   Are you in the first 12 months of your Medicare coverage?  No    Healthy Habits:     In general, how would you rate your overall health?  Excellent    Frequency of exercise:  None    Do you usually eat at least 4 servings of fruit and vegetables a day, include whole grains    & fiber and avoid regularly eating high fat or \"junk\" foods?  No    Taking medications regularly:  Yes    Medication side effects:  None    Ability to successfully perform activities of daily living:  No assistance needed    Home Safety:  No safety concerns identified    Hearing Impairment:  Feel that people are mumbling or not speaking clearly and need to ask people to speak up or repeat themselves    In the past 6 months, have you been bothered by leaking of urine?  No    In general, how would you rate your overall mental or emotional health?  Excellent      PHQ-2 Total Score: 0    Additional concerns today:  No    Do you feel safe in your environment? Yes    Have you ever done Advance Care Planning? (For example, a Health Directive, POLST, or a discussion with a medical provider or your loved ones about your wishes): Yes, advance care planning is on file.             Fall risk  Fallen 2 or more times in the past year?: No  Any fall with injury in the past year?: No    Cognitive Screening   1) Repeat 3 items (Leader, Season, Table)    2) Clock draw: NORMAL  3) 3 item recall: Recalls 3 objects  Results: 3 items recalled: COGNITIVE IMPAIRMENT LESS LIKELY    Mini-CogTM Copyright BUDDY Grullon. Licensed by the author for use in [facility]; reprinted with permission (zoey@.[facility]). All rights reserved.      Do you have sleep apnea, excessive snoring or daytime drowsiness?: no    Reviewed and updated as needed this visit by clinical staff  Tobacco  " Allergies  Meds   Med Hx  Surg Hx  Fam Hx  Soc Hx        Reviewed and updated as needed this visit by Provider                Social History     Tobacco Use     Smoking status: Never Smoker     Smokeless tobacco: Never Used   Substance Use Topics     Alcohol use: Yes     Comment: rarely     If you drink alcohol do you typically have >3 drinks per day or >7 drinks per week? No    Alcohol Use 8/29/2021   Prescreen: >3 drinks/day or >7 drinks/week? No   Prescreen: >3 drinks/day or >7 drinks/week? -         PROBLEMS TO ADD ON...In addition to an Annual Wellness Exam, we addressed diabetes mellitus, hyperlipidemia, hypertension, and foreign bodies (hearing aid traps) noted in his auditary canal by audiology.      The patient is tolerating his current medications without any adverse effects.   His 14 day glucose average: 111         90 day average: 122    BP appears satisfactorily controlled.  LDL-Cholesterol historically well controlled on statin therapy.     His audiologist noted a hearing aide trap in one of his ear canals.   Current providers sharing in care for this patient include:   Patient Care Team:  Roverto Edwards MD as PCP - General  Roverto Edwards MD as Assigned PCP    The following health maintenance items are reviewed in Epic and correct as of today:  Health Maintenance Due   Topic Date Due     ANNUAL REVIEW OF HM ORDERS  Never done     ZOSTER IMMUNIZATION (1 of 2) Never done     A1C  01/29/2021     EYE EXAM  07/14/2021     BMP  07/29/2021     LIPID  07/29/2021     MICROALBUMIN  07/29/2021     DIABETIC FOOT EXAM  07/29/2021     FALL RISK ASSESSMENT  07/29/2021     INFLUENZA VACCINE (1) 09/01/2021       Pneumonia Vaccine: Patient has received both pneumonia vaccinations.      Review of Systems   Constitutional: Negative for chills and fever.   HENT: Negative for congestion, ear pain, hearing loss and sore throat.    Eyes: Negative for pain and visual disturbance.   Respiratory: Negative for cough and  "shortness of breath.    Cardiovascular: Negative for chest pain, palpitations and peripheral edema.   Gastrointestinal: Negative for abdominal pain, constipation, diarrhea, heartburn, hematochezia and nausea.   Genitourinary: Positive for impotence. Negative for discharge, dysuria, frequency, genital sores, hematuria and urgency.   Musculoskeletal: Negative for arthralgias, joint swelling and myalgias.   Skin: Negative for rash.   Neurological: Negative for dizziness, weakness, headaches and paresthesias.   Psychiatric/Behavioral: Negative for mood changes. The patient is not nervous/anxious.        OBJECTIVE:   /64   Pulse 71   Temp 98  F (36.7  C) (Oral)   Resp 16   Ht 1.74 m (5' 8.5\")   Wt 101.6 kg (224 lb)   SpO2 97%   BMI 33.56 kg/m   Estimated body mass index is 33.56 kg/m  as calculated from the following:    Height as of this encounter: 1.74 m (5' 8.5\").    Weight as of this encounter: 101.6 kg (224 lb).     Physical Exam  GENERAL: healthy, alert and no distress  EYES: Eyes grossly normal to inspection, PERRL and conjunctivae and sclerae normal  HENT: normal cephalic/atraumatic  Foreign body noted in one canal. Unable to remove with alligator forceps. Referred to ENT for removal.   Ear canals and TM's otherwise normal, nose and mouth without ulcers or lesions, oropharynx clear and oral mucous membranes moist  NECK: no adenopathy, no asymmetry, masses, or scars and thyroid normal to palpation  RESP: lungs clear to auscultation - no rales, rhonchi or wheezes  CV: regular rate and rhythm, normal S1 S2, no S3 or S4, no murmur, click or rub, no peripheral edema and peripheral pulses strong  ABDOMEN: soft, nontender, no hepatosplenomegaly, no masses and bowel sounds normal  MS: no gross musculoskeletal defects noted, no edema  SKIN: no suspicious lesions or rashes  NEURO: Normal strength and tone, mentation intact and speech normal  PSYCH: mentation appears normal, affect normal/bright    Diagnostic " Test Results:  Labs reviewed in Epic    ASSESSMENT / PLAN:   (Z00.00) Encounter for Medicare annual wellness exam  (primary encounter diagnosis)  Comment: Stable health. See epic orders.     (E11.9) Type 2 diabetes mellitus without complication, without long-term current use of insulin (H)  Comment: A1c at target. Continue current measures.   Plan: **A1C FUTURE 3mo, Albumin Random Urine         Quantitative with Creat Ratio, **TSH with free         T4 reflex FUTURE 2mo, blood glucose (TRUE         METRIX BLOOD GLUCOSE TEST) test strip, Vascular        Medicine Referral, OFFICE/OUTPT VISIT,EST,LEVL         III, FOOT EXAM, T4 free          (E78.5) Hyperlipidemia LDL goal <130  Comment: Update lipids. Continue current meds.   Plan: Lipid panel reflex to direct LDL Fasting,         **Comprehensive metabolic panel FUTURE 2mo,         simvastatin (ZOCOR) 40 MG tablet, OFFICE/OUTPT         VISIT,EST,LEVL III          (I10) Essential hypertension  Comment: BP at target. Continue current meds.   Plan: atenolol (TENORMIN) 50 MG tablet,         hydrochlorothiazide (HYDRODIURIL) 25 MG tablet,        OFFICE/OUTPT VISIT,EST,LEVL III          (L03.119) Recurrent cellulitis of lower extremity  Comment: Offered refill of triamcinolone creams.   Plan: triamcinolone (KENALOG) 0.1 % external cream,         Vascular Medicine Referral, OFFICE/OUTPT         VISIT,EST,LEVL III          (N52.9) ERECTILE DYSFUNCTION  Comment: Offered Rx for Cialis.   Plan: tadalafil (CIALIS) 20 MG tablet          (Z12.5) Screening PSA (prostate specific antigen)  Plan: PSA, screen          (Z79.899) Medication management  Plan: **CBC with platelets FUTURE 2mo          (T16.2XXA) Acute foreign body of left ear canal, initial encounter  Comment: Unable to remove with alligator forceps. Referred to ENT for removal.   Plan: Otolaryngology Referral            Patient has been advised of split billing requirements and indicates understanding:  "Yes  COUNSELING:  Reviewed preventive health counseling, as reflected in patient instructions    Estimated body mass index is 33.56 kg/m  as calculated from the following:    Height as of this encounter: 1.74 m (5' 8.5\").    Weight as of this encounter: 101.6 kg (224 lb).    Weight management plan: Discussed healthy diet and exercise guidelines    He reports that he has never smoked. He has never used smokeless tobacco.      Appropriate preventive services were discussed with this patient, including applicable screening as appropriate for cardiovascular disease, diabetes, osteopenia/osteoporosis, and glaucoma.  As appropriate for age/gender, discussed screening for colorectal cancer, prostate cancer, breast cancer, and cervical cancer. Checklist reviewing preventive services available has been given to the patient.    Reviewed patients plan of care and provided an AVS. The Basic Care Plan (routine screening as documented in Health Maintenance) for Connor meets the Care Plan requirement. This Care Plan has been established and reviewed with the Patient.    Counseling Resources:  ATP IV Guidelines  Pooled Cohorts Equation Calculator  Breast Cancer Risk Calculator  Breast Cancer: Medication to Reduce Risk  FRAX Risk Assessment  ICSI Preventive Guidelines  Dietary Guidelines for Americans, 2010  [employer]'s MyPlate  ASA Prophylaxis  Lung CA Screening    Roverto Edwards MD,   [facility]      Patient Instructions   Someone will contact you to help you to schedule an office consult with the \"Vascular Medicine\" specialist, regarding the \"venous insufficiency\" and recurrent cellulitis.     Everything looks fine!    Refills of medications have been faxed to your pharmacy.     Lab results will be available soon on [employer].    See me in a year, sooner if problems.        "

## 2021-09-05 ENCOUNTER — HEALTH MAINTENANCE LETTER (OUTPATIENT)
Age: 76
End: 2021-09-05

## 2021-10-19 ENCOUNTER — OFFICE VISIT (OUTPATIENT)
Dept: SURGERY | Facility: CLINIC | Age: 76
End: 2021-10-19
Attending: INTERNAL MEDICINE
Payer: COMMERCIAL

## 2021-10-19 VITALS
DIASTOLIC BLOOD PRESSURE: 56 MMHG | SYSTOLIC BLOOD PRESSURE: 110 MMHG | BODY MASS INDEX: 33.18 KG/M2 | WEIGHT: 224 LBS | HEART RATE: 66 BPM | OXYGEN SATURATION: 98 % | RESPIRATION RATE: 16 BRPM | HEIGHT: 69 IN

## 2021-10-19 DIAGNOSIS — I87.2 VENOUS (PERIPHERAL) INSUFFICIENCY: Primary | ICD-10-CM

## 2021-10-19 PROCEDURE — 99204 OFFICE O/P NEW MOD 45 MIN: CPT | Performed by: INTERNAL MEDICINE

## 2021-10-19 ASSESSMENT — MIFFLIN-ST. JEOR: SCORE: 1728.5

## 2021-10-19 NOTE — PROGRESS NOTES
INITIAL VASCULAR MEDICINE ASSESSMENT  REFERRING MD: LYUDMILA  REASON FOR CONSULT: CELLULITIS, POSSIBLE VENOUS INSUFFICIENCY    HPI: Connor Srinivasan is a 76 year old male glycemically and lipemically controlled type 2 diabetic w/o a h/o DVT or SVT who has had left lower extremity cellulitis over the last several months wherein he presented  . He notes several years of lipodermatosclerosis. He is obese. Body mass index is 33.56 kg/m .      Review Of Systems  Skin: negative  Eyes: negative  Ears/Nose/Throat: negative  Respiratory: No shortness of breath, dyspnea on exertion, cough, or hemoptysis  Cardiovascular: negative  Gastrointestinal: negative  Genitourinary: negative  Musculoskeletal: negative  Neurologic: negative  Psychiatric: negative  Hematologic/Lymphatic/Immunologic: negative  Endocrine: negative    PAST MEDICAL HISTORY:                  Past Medical History:   Diagnosis Date     Cellulitis of left lower extremity 07/2021     Chronic rhinitis      Localized osteoarthrosis not specified whether primary or secondary, lower leg      Onychomycoses      Other and unspecified hyperlipidemia      Unspecified essential hypertension        PAST SURGICAL HISTORY:                  Past Surgical History:   Procedure Laterality Date     C TOTAL KNEE ARTHROPLASTY  1/2004    Knee Replacement, Total (Right)     C TOTAL KNEE ARTHROPLASTY  10/2003    Knee Replacement, Total (Left)     COLONOSCOPY N/A 6/28/2018    Procedure: COLONOSCOPY;  Colonoscopy  ;  Surgeon: Benigno Landaverde MD;  Location:  GI     HC EXCISION PILONIDAL LESION SIMPLE  1966     PHACOEMULSIFICATION CLEAR CORNEA WITH STANDARD INTRAOCULAR LENS IMPLANT Right 6/12/2017    Procedure: PHACOEMULSIFICATION CLEAR CORNEA WITH STANDARD INTRAOCULAR LENS IMPLANT;  RIGHT PHACOEMULSIFICATION CLEAR CORNEA WITH STANDARD INTRAOCULAR LENS IMPLANT ;  Surgeon: John Sanchez MD;  Location: [facility]     PHACOEMULSIFICATION CLEAR CORNEA WITH STANDARD INTRAOCULAR  LENS IMPLANT Left 6/28/2017    Procedure: PHACOEMULSIFICATION CLEAR CORNEA WITH STANDARD INTRAOCULAR LENS IMPLANT;  LEFT PHACOEMULSIFICATION CLEAR CORNEA WITH STANDARD INTRAOCULAR LENS IMPLANT ;  Surgeon: John Sanchez MD;  Location: [facility] COLONOSCOPY THRU STOMA, DIAGNOSTIC  4/3/2006    Normal       CURRENT MEDICATIONS:                  Current Outpatient Medications   Medication Sig Dispense Refill     ASPIRIN 81 MG OR TABS ONE DAILY 100 3     atenolol (TENORMIN) 50 MG tablet Take 1 tablet (50 mg) by mouth daily 90 tablet 3     blood glucose (TRUE METRIX BLOOD GLUCOSE TEST) test strip Use to test blood sugar 1 times daily or as directed. 100 strip 4     Blood Glucose Monitoring Suppl (TRUE METRIX METER) w/Device KIT 1 each daily Use to check blood sugar 1 time daily. 1 kit 0     Cetirizine HCl (KLS ALLER-ANDRE PO) Take 1 tablet by mouth daily       hydrochlorothiazide (HYDRODIURIL) 25 MG tablet Take 1 tablet (25 mg) by mouth daily 90 tablet 3     simvastatin (ZOCOR) 40 MG tablet Take 1 tablet (40 mg) by mouth At Bedtime 90 tablet 3     tadalafil (CIALIS) 20 MG tablet Take one tablet by mouth. TAKE AT LEAST 30 MINUTES BEFORE INTERCOURSE, 20 tablet 11     triamcinolone (KENALOG) 0.1 % external cream Apply topically 2 times daily       TRUEPLUS LANCETS 33G MISC 1 each daily 100 each 1       ALLERGIES:                  Allergies   Allergen Reactions     No Known Drug Allergies        SOCIAL HISTORY:                  Social History     Socioeconomic History     Marital status:      Spouse name: Camille     Number of children: 3     Years of education: Not on file     Highest education level: Bachelor's degree (e.g., BA, AB, BS)   Occupational History     Occupation: E-mail      Employer: [employer]     Comment: Previous job     Employer: [employer]     Comment: [employer]--(33-40 hours/week)   Tobacco Use     Smoking status: Never Smoker     Smokeless tobacco: Never Used   Vaping Use      Vaping Use: Never used   Substance and Sexual Activity     Alcohol use: Yes     Comment: rarely     Drug use: No     Sexual activity: Yes     Partners: Female   Other Topics Concern     Parent/sibling w/ CABG, MI or angioplasty before 65F 55M? Not Asked   Social History Narrative    Walking at [employer]--usually averages 10,000 steps those days.      . 3 children, one in [place], one in [place], one in [place]. 9 living grandchildren.      Social Determinants of Health     Financial Resource Strain: Low Risk      Difficulty of Paying Living Expenses: Not hard at all   Food Insecurity: No Food Insecurity     Worried About Running Out of Food in the Last Year: Never true     Ran Out of Food in the Last Year: Never true   Transportation Needs: No Transportation Needs     Lack of Transportation (Medical): No     Lack of Transportation (Non-Medical): No   Physical Activity: Sufficiently Active     Days of Exercise per Week: 5 days     Minutes of Exercise per Session: 60 min   Stress:      Feeling of Stress :    Social Connections:      Frequency of Communication with Friends and Family:      Frequency of Social Gatherings with Friends and Family:      Attends [employer] Services:      Active Member of Clubs or Organizations:      Attends Club or Organization Meetings:      Marital Status:    Intimate Partner Violence: Not At Risk     Fear of Current or Ex-Partner: No     Emotionally Abused: No     Physically Abused: No     Sexually Abused: No       FAMILY HISTORY:                   Family History   Problem Relation Age of Onset     Heart Disease Mother          age 90, had two valves replaced, on coumadin. Hospitalized for GI bleeding in .     C.A.D. Father          in his 80's around .     Arthritis Sister         Born 1946, bilateral knee replacement for arthritis     Colon Cancer No family hx of          Physical exam Reveals:    O/P: WNL  HEENT: WNL  NECK: No JVD, thyromegaly, or  lymphadenopathy  HEART: RRR, no murmurs, gallops, or rubs  LUNGS: CTA bilaterally without rales, wheezes, or rhonchi  GI: NABS, nondistended, nontender, soft  EXT:without cyanosis, clubbing; positive one plus edema w/ CEAP C4 varicosities  NEURO: nonfocal  : no flank tenderness            Component      Latest Ref Rng & Units 9/1/2021   Sodium      133 - 144 mmol/L 139   Potassium      3.4 - 5.3 mmol/L 3.4   Chloride      94 - 109 mmol/L 108   Carbon Dioxide      20 - 32 mmol/L 23   Anion Gap      3 - 14 mmol/L 8   Urea Nitrogen      7 - 30 mg/dL 18   Creatinine      0.66 - 1.25 mg/dL 0.76   Calcium      8.5 - 10.1 mg/dL 8.4 (L)   Glucose      70 - 99 mg/dL 104 (H)   Alkaline Phosphatase      40 - 150 U/L 122   AST      0 - 45 U/L 36   ALT      0 - 70 U/L 66   Protein Total      6.8 - 8.8 g/dL 6.4 (L)   Albumin      3.4 - 5.0 g/dL 3.4   Bilirubin Total      0.2 - 1.3 mg/dL 0.9   GFR Estimate      >60 mL/min/1.73m2 89   WBC      4.0 - 11.0 10e3/uL 3.3 (L)   RBC Count      4.40 - 5.90 10e6/uL 4.78   Hemoglobin      13.3 - 17.7 g/dL 14.9   Hematocrit      40.0 - 53.0 % 45.8   MCV      78 - 100 fL 96   MCH      26.5 - 33.0 pg 31.2   MCHC      31.5 - 36.5 g/dL 32.5   RDW      10.0 - 15.0 % 13.4   Platelet Count      150 - 450 10e3/uL 107 (L)   Cholesterol      <200 mg/dL 111   Triglycerides      <150 mg/dL 88   HDL Cholesterol      >=40 mg/dL 26 (L)   LDL Cholesterol Calculated      <=100 mg/dL 67   Non HDL Cholesterol      <130 mg/dL 85   Patient Fasting > 8hrs?       Yes   Creatinine Urine      mg/dL 125   Albumin Urine mg/L      mg/L 12   Albumin Urine mg/g Cr      0.00 - 17.00 mg/g Cr 9.60   Hemoglobin A1C      0.0 - 5.6 % 6.3 (H)   TSH      0.40 - 4.00 mU/L 4.85 (H)   PSA      0.00 - 4.00 ug/L 0.72   T4 Free      0.76 - 1.46 ng/dL 0.97     A/P:    (I87.2) Venous (peripheral) insufficiency  (primary encounter diagnosis)  Comment: He has lipodermatosclerosis. He is advised obesity contributes to venous  insufficiency.   Plan: He will be prescribed Sigvaris knee high 20-30 mm Hg compressive stockings w/ a Doff n César device. RTC six months prn nonimprovement or inability to wear compression. Consider EVAL at that time, check bilateral venous competency study before seeing me.           Fortyfive minutes total medical care on today's date including precharting, interviewing and examining the patient , and post visit charting.

## 2021-10-21 NOTE — PROGRESS NOTES
Rx for knee high 20-30 mm Hg Compression Stockings and donning aid mailed to patient.    Marva Olson RN BSN  [facility]  [phone]

## 2021-10-22 NOTE — H&P (VIEW-ONLY)
[place] [address]  [facility] [address]  [phone]  Dept: [phone]    PRE-OP EVALUATION:  Today's date: 2017    Connor Srinivasan (: 1945) presents for pre-operative evaluation assessment as requested by Dr. BENSON Jama.  He requires evaluation and anesthesia risk assessment prior to undergoing surgery/procedure for treatment of R eye cataract .  Proposed procedure: R ey cataract extraction    Date of Surgery/ Procedure: 17 and 17  Time of Surgery/ Procedure: 9 am on 17 and 1:00 on 17  Hospital/Surgical Facility: [facility]  Primary Physician: Roverto Edwards  Type of Anesthesia Anticipated: to be determined    Patient has a Health Care Directive or Living Will:  NO    Preop Questions 2017   1.  Do you have a history of heart attack, stroke, stent, bypass or surgery on an artery in the head, neck, heart or legs? No   2.  Do you ever have any pain or discomfort in your chest? No   3.  Do you have a history of  Heart Failure? No   4.   Are you troubled by shortness of breath when:  walking on a level surface, or up a slight hill, or at night? No   5.  Do you currently have a cold, bronchitis or other respiratory infection? No   6.  Do you have a cough, shortness of breath, or wheezing? No   7.  Do you sometimes get pains in the calves of your legs when you walk? No   8. Do you or anyone in your family have previous history of blood clots? No   9.  Do you or does anyone in your family have a serious bleeding problem such as prolonged bleeding following surgeries or cuts? No   10. Have you ever had problems with anemia or been told to take iron pills? No   11. Have you had any abnormal blood loss such as black, tarry or bloody stools? No   12. Have you ever had a blood transfusion? No   13. Have you or any of your relatives ever had problems with anesthesia? No   14. Do you have sleep apnea, excessive snoring or daytime drowsiness? No    15. Do you have any prosthetic heart valves? No   16. Do you have prosthetic joints? YES - bilateral knee replacements         HPI:                                                      Brief HPI related to upcoming procedure: R eye cataract      HYPERTENSION - Patient has longstanding history of mod-severe HTN , currently denies any symptoms referable to elevated blood pressure. Specifically denies chest pain, palpitations, dyspnea, orthopnea, PND or peripheral edema. Blood pressure readings have been in normal range. Current medication regimen is as listed below. Patient denies any side effects of medication.                                                                                                                                                                                          .  HYPERLIPIDEMIA - Patient has a long history of significant Hyperlipidemia requiring medication for treatment with recent good control. Patient reports no problems or side effects with the medication.                                                                                                                                                       .    MEDICAL HISTORY:                                                      Patient Active Problem List    Diagnosis Date Noted     Obesity, BMI>35 with comorbidity 05/28/2017     Priority: Medium     Other abnormal glucose 07/03/2016     Priority: Medium     Health Care Home 02/29/2012     Priority: Medium     FPA [employer] for .  Vonda Scott RN-BSN, [facility]  [phone]   DX V65.8 REPLACED WITH [id number] HEALTH CARE HOME (04/08/2013)       Advanced directives, counseling/discussion 04/29/2011     Priority: Medium     Parent voices understanding and acceptance of this advice and will call back if any further questions or concerns.         HYPERLIPIDEMIA LDL GOAL <130 10/31/2010     Priority: Medium     Overweight 04/29/2009     Priority: Medium     ERECTILE  DYSFUNCTION 01/18/2008     Priority: Medium     Chronic rhinitis      Priority: Medium     Essential hypertension 11/23/2004     Priority: Medium     Problem list name updated by automated process. Provider to review       Localized osteoarthrosis, lower leg 11/23/2004     Priority: Medium     Problem list name updated by automated process. Provider to review        Past Medical History:   Diagnosis Date     Chronic rhinitis      Localized osteoarthrosis not specified whether primary or secondary, lower leg      Onychomycoses      Other and unspecified hyperlipidemia      Unspecified essential hypertension      Past Surgical History:   Procedure Laterality Date     C TOTAL KNEE ARTHROPLASTY  1/2004    Knee Replacement, Total (Right)     C TOTAL KNEE ARTHROPLASTY  10/2003    Knee Replacement, Total (Left)     HC COLONOSCOPY THRU STOMA, DIAGNOSTIC  4/3/2006    Normal     HC EXCISION PILONIDAL LESION SIMPLE  1966     Current Outpatient Prescriptions   Medication Sig Dispense Refill     tadalafil (CIALIS) 20 MG tablet Take one tablet by mouth. TAKE AT LEAST 30 MINUTES BEFORE INTERCOURSE, 16 tablet 3     hydrochlorothiazide (HYDRODIURIL) 25 MG tablet Take 1 tablet (25 mg) by mouth daily 90 tablet 3     atenolol (TENORMIN) 50 MG tablet Take 1 tablet (50 mg) by mouth daily 90 tablet 3     simvastatin (ZOCOR) 40 MG tablet Take 1 tablet (40 mg) by mouth At Bedtime 90 tablet 3     fluticasone (FLONASE) 50 MCG/ACT spray Spray 2 sprays into both nostrils daily 48 g 0     sildenafil (VIAGRA) 100 MG tablet Take 1 tablet (100 mg) by mouth daily as needed for erectile dysfunction Take at least 30 min to 4 hours before intercourse. 16 tablet 11     tadalafil (CIALIS) 5 MG tablet Take 1 tablet (5 mg) by mouth daily 30 tablet 1     ASPIRIN 81 MG OR TABS ONE DAILY 100 3     OTC products: None, except as noted above    Allergies   Allergen Reactions     No Known Drug Allergies       Latex Allergy: NO    Social History   Substance Use  "Topics     Smoking status: Never Smoker     Smokeless tobacco: Never Used     Alcohol use Yes      Comment: rarely     History   Drug Use No       REVIEW OF SYSTEMS:                                                    C: NEGATIVE for fever, chills, change in weight  E/M: NEGATIVE for ear, mouth and throat problems  R: NEGATIVE for significant cough or SOB  CV: NEGATIVE for chest pain, palpitations or peripheral edema  GI: NEGATIVE for nausea, abdominal pain, heartburn, or change in bowel habits  : NEGATIVE for frequency, dysuria, or hematuria  M: NEGATIVE for significant arthralgias or myalgia  N: NEGATIVE for weakness, dizziness or paresthesias  E: NEGATIVE for temperature intolerance, skin/hair changes  H: NEGATIVE for bleeding problems  P: NEGATIVE for changes in mood or affect    EXAM:                                                    /70 (BP Location: Right arm, Patient Position: Chair, Cuff Size: Adult Large)  Pulse 75  Temp 98.1  F (36.7  C) (Oral)  Resp 16  Ht 5' 9\" (1.753 m)  Wt 242 lb 12.8 oz (110.1 kg)  SpO2 95%  BMI 35.86 kg/m2    GENERAL APPEARANCE: obese male     NECK: no adenopathy, no asymmetry, masses, or scars and thyroid normal to palpation     RESP: lungs clear to auscultation - no rales, rhonchi or wheezes     CV: regular rates and rhythm, normal S1 S2, no S3 or S4 and no murmur, click or rub     ABDOMEN:  soft, nontender, no HSM or masses and bowel sounds normal     MS: extremities normal- no gross deformities noted, no evidence of inflammation in joints, FROM in all extremities.     NEURO: Normal strength and tone, sensory exam grossly normal, mentation intact and speech normal     PSYCH: mentation appears normal. and affect normal/bright    DIAGNOSTICS:                                                    No labs or EKG required for low risk surgery (cataract, skin procedure, breast biopsy, etc)    Recent Labs   Lab Test  05/26/17   1003  04/21/16   1115   04/29/11   0906   HGB  " Statement Selected 15.8  16.2   < >  17.5   PLT  102*  112*   < >  114*   NA  142  141   < >  143   POTASSIUM  4.0  4.5   < >  4.3   CR  0.73  0.82   < >  0.92   A1C  6.3*   --    --   6.7*    < > = values in this interval not displayed.        IMPRESSION:                                                    Reason for surgery/procedure: R eye cataract    The proposed surgical procedure is considered LOW risk.    REVISED CARDIAC RISK INDEX  The patient has the following serious cardiovascular risks for perioperative complications such as (MI, PE, VFib and 3  AV Block):  No serious cardiac risks  INTERPRETATION: 0 risks: Class I (very low risk - 0.4% complication rate)    The patient has the following additional risks for perioperative complications:  No identified additional risks    No diagnosis found.    RECOMMENDATIONS:                                                      --Consult hospital rounder / IM to assist post-op medical management    --Patient is to take all scheduled medications on the day of surgery EXCEPT for modifications listed below.    APPROVAL GIVEN to proceed with proposed procedure, without further diagnostic evaluation       Signed Electronically by: Dilma Barry NP    Copy of this evaluation report is provided to requesting physician.    [facility] Preop Guidelines

## 2022-04-17 ENCOUNTER — HEALTH MAINTENANCE LETTER (OUTPATIENT)
Age: 77
End: 2022-04-17

## 2022-09-03 ASSESSMENT — ENCOUNTER SYMPTOMS
NERVOUS/ANXIOUS: 0
DIZZINESS: 0
FEVER: 0
ARTHRALGIAS: 1
HEARTBURN: 0
WEAKNESS: 0
PALPITATIONS: 0
CONSTIPATION: 0
COUGH: 0
CHILLS: 0
HEMATOCHEZIA: 0
HEADACHES: 0
ABDOMINAL PAIN: 0
FREQUENCY: 0
MYALGIAS: 0
DYSURIA: 0
SORE THROAT: 0
HEMATURIA: 0
JOINT SWELLING: 0
EYE PAIN: 0
DIARRHEA: 0
NAUSEA: 0
SHORTNESS OF BREATH: 0
PARESTHESIAS: 0

## 2022-09-03 ASSESSMENT — ACTIVITIES OF DAILY LIVING (ADL): CURRENT_FUNCTION: NO ASSISTANCE NEEDED

## 2022-09-07 ENCOUNTER — OFFICE VISIT (OUTPATIENT)
Dept: INTERNAL MEDICINE | Facility: CLINIC | Age: 77
End: 2022-09-07
Payer: COMMERCIAL

## 2022-09-07 VITALS
DIASTOLIC BLOOD PRESSURE: 64 MMHG | HEIGHT: 68 IN | OXYGEN SATURATION: 99 % | HEART RATE: 72 BPM | RESPIRATION RATE: 16 BRPM | TEMPERATURE: 97.7 F | BODY MASS INDEX: 31.07 KG/M2 | SYSTOLIC BLOOD PRESSURE: 110 MMHG | WEIGHT: 205 LBS

## 2022-09-07 DIAGNOSIS — Z00.00 ENCOUNTER FOR MEDICARE ANNUAL WELLNESS EXAM: Primary | ICD-10-CM

## 2022-09-07 DIAGNOSIS — N52.9 IMPOTENCE OF ORGANIC ORIGIN: ICD-10-CM

## 2022-09-07 DIAGNOSIS — E11.9 TYPE 2 DIABETES MELLITUS WITHOUT COMPLICATION, WITHOUT LONG-TERM CURRENT USE OF INSULIN (H): ICD-10-CM

## 2022-09-07 DIAGNOSIS — Z12.5 SCREENING PSA (PROSTATE SPECIFIC ANTIGEN): ICD-10-CM

## 2022-09-07 DIAGNOSIS — I10 ESSENTIAL HYPERTENSION: ICD-10-CM

## 2022-09-07 DIAGNOSIS — E78.5 HYPERLIPIDEMIA LDL GOAL <130: ICD-10-CM

## 2022-09-07 LAB
ALBUMIN SERPL-MCNC: 3.7 G/DL (ref 3.4–5)
ALP SERPL-CCNC: 122 U/L (ref 40–150)
ALT SERPL W P-5'-P-CCNC: 72 U/L (ref 0–70)
ANION GAP SERPL CALCULATED.3IONS-SCNC: 9 MMOL/L (ref 3–14)
AST SERPL W P-5'-P-CCNC: 40 U/L (ref 0–45)
BASOPHILS # BLD AUTO: 0 10E3/UL (ref 0–0.2)
BASOPHILS NFR BLD AUTO: 0 %
BILIRUB SERPL-MCNC: 0.9 MG/DL (ref 0.2–1.3)
BUN SERPL-MCNC: 24 MG/DL (ref 7–30)
CALCIUM SERPL-MCNC: 8.7 MG/DL (ref 8.5–10.1)
CHLORIDE BLD-SCNC: 108 MMOL/L (ref 94–109)
CHOLEST SERPL-MCNC: 105 MG/DL
CO2 SERPL-SCNC: 22 MMOL/L (ref 20–32)
CREAT SERPL-MCNC: 0.7 MG/DL (ref 0.66–1.25)
CREAT UR-MCNC: 139 MG/DL
EOSINOPHIL # BLD AUTO: 0.1 10E3/UL (ref 0–0.7)
EOSINOPHIL NFR BLD AUTO: 4 %
ERYTHROCYTE [DISTWIDTH] IN BLOOD BY AUTOMATED COUNT: 12.7 % (ref 10–15)
FASTING STATUS PATIENT QL REPORTED: YES
GFR SERPL CREATININE-BSD FRML MDRD: >90 ML/MIN/1.73M2
GLUCOSE BLD-MCNC: 113 MG/DL (ref 70–99)
HBA1C MFR BLD: 5.9 % (ref 0–5.6)
HCT VFR BLD AUTO: 46.7 % (ref 40–53)
HDLC SERPL-MCNC: 23 MG/DL
HGB BLD-MCNC: 15.5 G/DL (ref 13.3–17.7)
IMM GRANULOCYTES # BLD: 0 10E3/UL
IMM GRANULOCYTES NFR BLD: 0 %
LDLC SERPL CALC-MCNC: 63 MG/DL
LYMPHOCYTES # BLD AUTO: 0.7 10E3/UL (ref 0.8–5.3)
LYMPHOCYTES NFR BLD AUTO: 19 %
MCH RBC QN AUTO: 32 PG (ref 26.5–33)
MCHC RBC AUTO-ENTMCNC: 33.2 G/DL (ref 31.5–36.5)
MCV RBC AUTO: 96 FL (ref 78–100)
MICROALBUMIN UR-MCNC: 17 MG/L
MICROALBUMIN/CREAT UR: 12.23 MG/G CR (ref 0–17)
MONOCYTES # BLD AUTO: 0.5 10E3/UL (ref 0–1.3)
MONOCYTES NFR BLD AUTO: 14 %
NEUTROPHILS # BLD AUTO: 2.2 10E3/UL (ref 1.6–8.3)
NEUTROPHILS NFR BLD AUTO: 62 %
NONHDLC SERPL-MCNC: 82 MG/DL
PLATELET # BLD AUTO: 117 10E3/UL (ref 150–450)
POTASSIUM BLD-SCNC: 3.8 MMOL/L (ref 3.4–5.3)
PROT SERPL-MCNC: 6.3 G/DL (ref 6.8–8.8)
PSA SERPL-MCNC: 0.64 UG/L (ref 0–4)
RBC # BLD AUTO: 4.85 10E6/UL (ref 4.4–5.9)
SODIUM SERPL-SCNC: 139 MMOL/L (ref 133–144)
TRIGL SERPL-MCNC: 94 MG/DL
TSH SERPL DL<=0.005 MIU/L-ACNC: 3.19 MU/L (ref 0.4–4)
WBC # BLD AUTO: 3.5 10E3/UL (ref 4–11)

## 2022-09-07 PROCEDURE — 82043 UR ALBUMIN QUANTITATIVE: CPT | Performed by: INTERNAL MEDICINE

## 2022-09-07 PROCEDURE — 80050 GENERAL HEALTH PANEL: CPT | Performed by: INTERNAL MEDICINE

## 2022-09-07 PROCEDURE — 36415 COLL VENOUS BLD VENIPUNCTURE: CPT | Performed by: INTERNAL MEDICINE

## 2022-09-07 PROCEDURE — 80061 LIPID PANEL: CPT | Performed by: INTERNAL MEDICINE

## 2022-09-07 PROCEDURE — 83036 HEMOGLOBIN GLYCOSYLATED A1C: CPT | Performed by: INTERNAL MEDICINE

## 2022-09-07 PROCEDURE — 99214 OFFICE O/P EST MOD 30 MIN: CPT | Mod: 25 | Performed by: INTERNAL MEDICINE

## 2022-09-07 PROCEDURE — G0439 PPPS, SUBSEQ VISIT: HCPCS | Performed by: INTERNAL MEDICINE

## 2022-09-07 PROCEDURE — G0103 PSA SCREENING: HCPCS | Performed by: INTERNAL MEDICINE

## 2022-09-07 RX ORDER — SIMVASTATIN 40 MG
40 TABLET ORAL AT BEDTIME
Qty: 90 TABLET | Refills: 3 | Status: SHIPPED | OUTPATIENT
Start: 2022-09-07 | End: 2023-10-31

## 2022-09-07 RX ORDER — HYDROCHLOROTHIAZIDE 25 MG/1
25 TABLET ORAL DAILY
Qty: 90 TABLET | Refills: 3 | Status: SHIPPED | OUTPATIENT
Start: 2022-09-07 | End: 2023-10-31

## 2022-09-07 RX ORDER — TADALAFIL 20 MG/1
TABLET ORAL
Qty: 20 TABLET | Refills: 11 | Status: SHIPPED | OUTPATIENT
Start: 2022-09-07 | End: 2023-10-31

## 2022-09-07 RX ORDER — ATENOLOL 50 MG/1
50 TABLET ORAL DAILY
Qty: 90 TABLET | Refills: 3 | Status: SHIPPED | OUTPATIENT
Start: 2022-09-07 | End: 2023-10-31

## 2022-09-07 SDOH — ECONOMIC STABILITY: FOOD INSECURITY: WITHIN THE PAST 12 MONTHS, YOU WORRIED THAT YOUR FOOD WOULD RUN OUT BEFORE YOU GOT MONEY TO BUY MORE.: NEVER TRUE

## 2022-09-07 SDOH — ECONOMIC STABILITY: FOOD INSECURITY: WITHIN THE PAST 12 MONTHS, THE FOOD YOU BOUGHT JUST DIDN'T LAST AND YOU DIDN'T HAVE MONEY TO GET MORE.: NEVER TRUE

## 2022-09-07 ASSESSMENT — ENCOUNTER SYMPTOMS
FEVER: 0
PALPITATIONS: 0
DYSURIA: 0
JOINT SWELLING: 0
NAUSEA: 0
HEMATOCHEZIA: 0
EYE PAIN: 0
WEAKNESS: 0
CONSTIPATION: 0
HEADACHES: 0
SHORTNESS OF BREATH: 0
FREQUENCY: 0
MYALGIAS: 0
HEMATURIA: 0
SORE THROAT: 0
DIARRHEA: 0
PARESTHESIAS: 0
CHILLS: 0
COUGH: 0
ARTHRALGIAS: 1
HEARTBURN: 0
NERVOUS/ANXIOUS: 0
ABDOMINAL PAIN: 0
DIZZINESS: 0

## 2022-09-07 ASSESSMENT — SOCIAL DETERMINANTS OF HEALTH (SDOH)
WITHIN THE LAST YEAR, HAVE YOU BEEN KICKED, HIT, SLAPPED, OR OTHERWISE PHYSICALLY HURT BY YOUR PARTNER OR EX-PARTNER?: NO
WITHIN THE LAST YEAR, HAVE YOU BEEN HUMILIATED OR EMOTIONALLY ABUSED IN OTHER WAYS BY YOUR PARTNER OR EX-PARTNER?: NO
WITHIN THE LAST YEAR, HAVE TO BEEN RAPED OR FORCED TO HAVE ANY KIND OF SEXUAL ACTIVITY BY YOUR PARTNER OR EX-PARTNER?: NO
HOW HARD IS IT FOR YOU TO PAY FOR THE VERY BASICS LIKE FOOD, HOUSING, MEDICAL CARE, AND HEATING?: NOT HARD AT ALL
WITHIN THE LAST YEAR, HAVE YOU BEEN AFRAID OF YOUR PARTNER OR EX-PARTNER?: NO

## 2022-09-07 ASSESSMENT — LIFESTYLE VARIABLES
HOW OFTEN DO YOU HAVE A DRINK CONTAINING ALCOHOL: MONTHLY OR LESS
AUDIT-C TOTAL SCORE: 1
HOW MANY STANDARD DRINKS CONTAINING ALCOHOL DO YOU HAVE ON A TYPICAL DAY: 1 OR 2
SKIP TO QUESTIONS 9-10: 1
HOW OFTEN DO YOU HAVE SIX OR MORE DRINKS ON ONE OCCASION: NEVER

## 2022-09-07 ASSESSMENT — ACTIVITIES OF DAILY LIVING (ADL): CURRENT_FUNCTION: NO ASSISTANCE NEEDED

## 2022-09-07 NOTE — PROGRESS NOTES
"    He is at risk for lack of exercise and has been provided with information to increase physical activity for the benefit of his well-being.  The patient was counseled and encouraged to consider modifying their diet and eating habits. He was provided with information on recommended healthy diet options.  Answers for HPI/ROS submitted by the patient on 9/3/2022  In general, how would you rate your overall physical health?: good  Frequency of exercise:: None  Do you usually eat at least 4 servings of fruit and vegetables a day, include whole grains & fiber, and avoid regularly eating high fat or \"junk\" foods? : No  Taking medications regularly:: Yes  Medication side effects:: None  Activities of Daily Living: no assistance needed  Home safety: no safety concerns identified  Hearing Impairment:: no hearing concerns  In the past 6 months, have you been bothered by leaking of urine?: No  abdominal pain: No  Blood in stool: No  Blood in urine: No  chest pain: No  chills: No  congestion: No  constipation: No  cough: No  diarrhea: No  dizziness: No  ear pain: No  eye pain: No  nervous/anxious: No  fever: No  frequency: No  genital sores: No  headaches: No  hearing loss: No  heartburn: No  arthralgias: Yes  joint swelling: No  peripheral edema: No  mood changes: No  myalgias: No  nausea: No  dysuria: No  palpitations: No  Skin sensation changes: No  sore throat: No  urgency: No  rash: No  shortness of breath: No  visual disturbance: No  weakness: No  impotence: Yes  penile discharge: No  In general, how would you rate your overall mental or emotional health?: excellent  Additional concerns today:: Yes      "

## 2022-09-07 NOTE — PATIENT INSTRUCTIONS
Patient Education   Personalized Prevention Plan  You are due for the preventive services outlined below.  Your care team is available to assist you in scheduling these services.  If you have already completed any of these items, please share that information with your care team to update in your medical record.  Health Maintenance Due   Topic Date Due    Zoster (Shingles) Vaccine (1 of 2) Never done    Eye Exam  07/14/2021    A1C Lab  03/01/2022    Basic Metabolic Panel  09/01/2022    Cholesterol Lab  09/01/2022    Kidney Microalbumin Urine Test  09/01/2022    Diabetic Foot Exam  09/01/2022    ANNUAL REVIEW OF HM ORDERS  09/01/2022    Flu Vaccine (1) 09/01/2022       Exercise for a Healthier Heart  You may wonder how you can improve the health of your heart. If you re thinking about exercise, you re on the right track. You don t need to become an athlete. But you do need a certain amount of brisk exercise to help strengthen your heart. If you have been diagnosed with a heart condition, your healthcare provider may advise exercise to help stabilize your condition. To help make exercise a habit, choose safe, fun activities.      Exercise with a friend. When activity is fun, you're more likely to stick with it.   Before you start  Check with your healthcare provider before starting an exercise program. This is especially important if you have not been active for a while. It's also important if you have a long-term (chronic) health problem such as heart disease, diabetes, or obesity. Or if you are at high risk for having these problems.   Why exercise?  Exercising regularly offers many healthy rewards. It can help you do all of the following:   Improve your blood cholesterol level to help prevent further heart trouble  Lower your blood pressure to help prevent a stroke or heart attack  Control diabetes, or reduce your risk of getting this disease  Improve your heart and lung function  Reach and stay at a healthy  weight  Make your muscles stronger so you can stay active  Prevent falls and fractures by slowing the loss of bone mass (osteoporosis)  Manage stress better  Reduce your blood pressure  Improve your sense of self and your body image  Exercise tips    Ease into your routine. Set small goals. Then build on them. If you are not sure what your activity level should be, talk with your healthcare provider first before starting an exercise routine.  Exercise on most days. Aim for a total of 150 minutes (2 hours and 30 minutes) or more of moderate-intensity aerobic activity each week. Or 75 minutes (1 hour and 15 minutes) or more of vigorous-intensity aerobic activity each week. Or try for a combination of both. Moderate activity means that you breathe heavier and your heart rate increases but you can still talk. Think about doing 40 minutes of moderate exercise, 3 to 4 times a week. For best results, activity should last for about 40 minutes to lower blood pressure and cholesterol. It's OK to work up to the 40-minute period over time. Examples of moderate-intensity activity are walking 1 mile in 15 minutes. Or doing 30 to 45 minutes of yard work.  Step up your daily activity level.  Along with your exercise program, try being more active the whole day. Walk instead of drive. Or park further away so that you take more steps each day. Do more household tasks or yard work. You may not be able to meet the advised mount of physical activity. But doing some moderate- or vigorous-intensity aerobic activity can help reduce your risk for heart disease. Your healthcare provider can help you figure out what is best for you.  Choose 1 or more activities you enjoy.  Walking is one of the easiest things you can do. You can also try swimming, riding a bike, dancing, or taking an exercise class.    When to call your healthcare provider  Call your healthcare provider if you have any of these:   Chest pain or feel dizzy or  lightheaded  Burning, tightness, pressure, or heaviness in your chest, neck, shoulders, back, or arms  Abnormal shortness of breath  More joint or muscle pain  A very fast or irregular heartbeat (palpitations)  [employer] last reviewed this educational content on 7/1/2019 2000-2021 [employer]. All rights reserved. This information is not intended as a substitute for professional medical care. Always follow your healthcare professional's instructions.          Understanding USDA MyPlate  The USDA has guidelines to help you make healthy food choices. These are called MyPlate. MyPlate shows the food groups that make up healthy meals using the image of a place setting. Before you eat, think about the healthiest choices for what to put on your plate or in your cup or bowl. To learn more about building a healthy plate, visit www.choosemyplate.gov.    The food groups  Fruits. Any fruit or 100% fruit juice counts as part of the Fruit Group. Fruits may be fresh, canned, frozen, or dried, and may be whole, cut-up, or pureed. Make 1/2 of your plate fruits and vegetables.  Vegetables. Any vegetable or 100% vegetable juice counts as a member of the Vegetable Group. Vegetables may be fresh, frozen, canned, or dried. They can be served raw or cooked and may be whole, cut-up, or mashed. Make 1/2 of your plate fruits and vegetables.  Grains. All foods made from grains are part of the Grains Group. These include wheat, rice, oats, cornmeal, and barley. Grains are often used to make foods such as bread, pasta, oatmeal, cereal, tortillas, and grits. Grains should be no more than 1/4 of your plate. At least half of your grains should be whole grains.  Protein. This group includes meat, poultry, seafood, beans and peas, eggs, processed soy products (such as tofu), nuts (including nut butters), and seeds. Make protein choices no more than 1/4 of your plate. Meat and poultry choices should be lean or low fat.  Dairy. The  Dairy Group includes all fluid milk products and foods made from milk that contain calcium, such as yogurt and cheese. (Foods that have little calcium, such as cream, butter, and cream cheese, are not part of this group.) Most dairy choices should be low-fat or fat-free.  Oils. Oils aren't a food group, but they do contain essential nutrients. However it's important to watch your intake of oils. These are fats that are liquid at room temperature. They include canola, corn, olive, soybean, vegetable, and sunflower oil. Foods that are mainly oil include mayonnaise, certain salad dressings, and soft margarines. You likely already get your daily oil allowance from the foods you eat.  Things to limit  Eating healthy also means limiting these things in your diet:     Salt (sodium). Many processed foods have a lot of sodium. To keep sodium intake down, eat fresh vegetables, meats, poultry, and seafood when possible. Purchase low-sodium, reduced-sodium, or no-salt-added food products at the store. And don't add salt to your meals at home. Instead, season them with herbs and spices such as dill, oregano, cumin, and paprika. Or try adding flavor with lemon or lime zest and juice.  Saturated fat. Saturated fats are most often found in animal products such as beef, pork, and chicken. They are often solid at room temperature, such as butter. To reduce your saturated fat intake, choose leaner cuts of meat and poultry. And try healthier cooking methods such as grilling, broiling, roasting, or baking. For a simple lower-fat swap, use plain nonfat yogurt instead of mayonnaise when making potato salad or macaroni salad.  Added sugars. These are sugars added to foods. They are in foods such as ice cream, candy, soda, fruit drinks, sports drinks, energy drinks, cookies, pastries, jams, and syrups. Cut down on added sugars by sharing sweet treats with a family member or friend. You can also choose fruit for dessert, and drink water or  other unsweetened beverages.     [employer] last reviewed this educational content on 6/1/2020 2000-2021 [employer]. All rights reserved. This information is not intended as a substitute for professional medical care. Always follow your healthcare professional's instructions.      Great job on weight reduction. Even though weight loss has not been purposeful, no signs of illness apparent to cause your weight to go down.   Diabetes looks great. Recommend eye exam with [facility] yearly.   Blood pressure great. Awaiting lab results.     Refilled needed meds.     Okay to see me in a year, sooner if problems.

## 2022-09-07 NOTE — PROGRESS NOTES
"SUBJECTIVE:   Connor Srinivasan is a 77 year old male who presents for Preventive Visit.      Patient has been advised of split billing requirements and indicates understanding: Yes  Are you in the first 12 months of your Medicare coverage?  No    Healthy Habits:     In general, how would you rate your overall health?  Good    Frequency of exercise:  None    Do you usually eat at least 4 servings of fruit and vegetables a day, include whole grains    & fiber and avoid regularly eating high fat or \"junk\" foods?  No    Taking medications regularly:  Yes    Medication side effects:  None    Ability to successfully perform activities of daily living:  No assistance needed    Home Safety:  No safety concerns identified    Hearing Impairment:  No hearing concerns    In the past 6 months, have you been bothered by leaking of urine?  No    In general, how would you rate your overall mental or emotional health?  Excellent      PHQ-2 Total Score: 0    Additional concerns today:  Yes           Do you feel safe in your environment? Yes    Have you ever done Advance Care Planning? (For example, a Health Directive, POLST, or a discussion with a medical provider or your loved ones about your wishes): Yes, advance care planning is on file.       Fall risk  Fallen 2 or more times in the past year?: No  Any fall with injury in the past year?: No    Cognitive Screening   1) Repeat 3 items (Leader, Season, Table)  0  2) Clock draw: NORMAL  3) 3 item recall: Recalls 3 objects  Results: 3 items recalled: COGNITIVE IMPAIRMENT LESS LIKELY    Mini-CogTM Copyright BUDDY Grullon. Licensed by the author for use in [facility]; reprinted with permission (zoey@.[facility]). All rights reserved.      Do you have sleep apnea, excessive snoring or daytime drowsiness?: no    Reviewed and updated as needed this visit by clinical staff   Tobacco  Allergies  Meds   Med Hx  Surg Hx  Fam Hx  Soc Hx          Reviewed and updated as needed " this visit by Provider                   Social History     Tobacco Use     Smoking status: Never Smoker     Smokeless tobacco: Never Used   Substance Use Topics     Alcohol use: Yes     Comment: rarely     If you drink alcohol do you typically have >3 drinks per day or >7 drinks per week? No    Alcohol Use 9/3/2022   Prescreen: >3 drinks/day or >7 drinks/week? No   Prescreen: >3 drinks/day or >7 drinks/week? -         PROBLEMS TO ADD ON...In addition to an Annual Wellness Exam, we addressed diabetes mellitus, hyperlipidemia, hypertension, erectile dysfunction.     The patient is tolerating his current medications without any adverse effects.  A1c and LDL-Cholesterol have historically been well controlled.   Blood pressure appears satisfactorily controlled.   He requests refills of all meds including Cialis which has worked well for him.   We agreed to update needed labs.       Current providers sharing in care for this patient include:   Patient Care Team:  Roverto Edwards MD as PCP - General  Roverto Edwards MD as Assigned PCP  Don Navarro MD as Assigned Heart and Vascular Provider    The following health maintenance items are reviewed in Epic and correct as of today:  Health Maintenance Due   Topic Date Due     ZOSTER IMMUNIZATION (1 of 2) Never done     EYE EXAM  07/14/2021     A1C  03/01/2022     BMP  09/01/2022     LIPID  09/01/2022     MICROALBUMIN  09/01/2022     DIABETIC FOOT EXAM  09/01/2022     ANNUAL REVIEW OF HM ORDERS  09/01/2022     INFLUENZA VACCINE (1) 09/01/2022     MEDICARE ANNUAL WELLNESS VISIT  09/01/2022     Pneumonia Vaccine:  Patient has received both pneumonia vaccinations (Prevnar-13 and Pneumovax-23)       Review of Systems   Constitutional: Negative for chills and fever.   HENT: Negative for congestion, ear pain, hearing loss and sore throat.    Eyes: Negative for pain and visual disturbance.   Respiratory: Negative for cough and shortness of breath.    Cardiovascular:  "Negative for chest pain, palpitations and peripheral edema.   Gastrointestinal: Negative for abdominal pain, constipation, diarrhea, heartburn, hematochezia and nausea.   Genitourinary: Positive for impotence. Negative for dysuria, frequency, genital sores, hematuria, penile discharge and urgency.   Musculoskeletal: Positive for arthralgias. Negative for joint swelling and myalgias.   Skin: Negative for rash.   Neurological: Negative for dizziness, weakness, headaches and paresthesias.   Psychiatric/Behavioral: Negative for mood changes. The patient is not nervous/anxious.        OBJECTIVE:   /64   Pulse 72   Temp 97.7  F (36.5  C) (Tympanic)   Resp 16   Ht 1.735 m (5' 8.31\")   Wt 93 kg (205 lb)   SpO2 99%   BMI 30.89 kg/m   Estimated body mass index is 30.89 kg/m  as calculated from the following:    Height as of this encounter: 1.735 m (5' 8.31\").    Weight as of this encounter: 93 kg (205 lb).     Physical Exam  GENERAL: healthy, alert and no distress  EYES: Eyes grossly normal to inspection, PERRL and conjunctivae and sclerae normal  HENT: ear canals and TM's normal, nose and mouth without ulcers or lesions  NECK: no adenopathy, no asymmetry, masses, or scars and thyroid normal to palpation  RESP: lungs clear to auscultation - no rales, rhonchi or wheezes  CV: regular rate and rhythm, normal S1 S2, no S3 or S4, no murmur, click or rub, no peripheral edema and peripheral pulses strong  ABDOMEN: soft, nontender, no hepatosplenomegaly, no masses and bowel sounds normal  MS: no gross musculoskeletal defects noted, no edema  SKIN: no suspicious lesions or rashes  NEURO: Normal strength and tone, mentation intact and speech normal  PSYCH: mentation appears normal, affect normal/bright  Foot Exam: normal DP and PT pulses, no trophic changes or ulcerative lesions, normal sensory exam and normal monofilament exam    Diagnostic Test Results:  Labs reviewed in Epic    ASSESSMENT / PLAN:   (Z00.00) Encounter " "for Medicare annual wellness exam  (primary encounter diagnosis)  Comment: Stable health. See epic orders.     (E11.9) Type 2 diabetes mellitus without complication, without long-term current use of insulin (H)  Comment: A1c at target. Continue current measures.   Plan: HEMOGLOBIN A1C, Albumin Random Urine         Quantitative with Creat Ratio, OFFICE/OUTPT         VISIT,EST,LEVL III, FOOT EXAM          (I10) Essential hypertension  Comment: BP at target. Continue current meds.   Plan: hydrochlorothiazide (HYDRODIURIL) 25 MG tablet,        atenolol (TENORMIN) 50 MG tablet, OFFICE/OUTPT         VISIT,EST,LEVL III          (E78.5) Hyperlipidemia LDL goal <130  Comment: LDL at target. Continue current meds.   Plan: simvastatin (ZOCOR) 40 MG tablet, OFFICE/OUTPT         VISIT,EST,LEVL III          (N52.9) ERECTILE DYSFUNCTION  Comment: good results with Cialis--refilled.   Plan: tadalafil (CIALIS) 20 MG tablet          (Z12.5) Screening PSA (prostate specific antigen)  Plan: PSA, screen            Patient has been advised of split billing requirements and indicates understanding: Yes    COUNSELING:  Reviewed preventive health counseling, as reflected in patient instructions    Estimated body mass index is 30.89 kg/m  as calculated from the following:    Height as of this encounter: 1.735 m (5' 8.31\").    Weight as of this encounter: 93 kg (205 lb).    Weight management plan: Discussed healthy diet and exercise guidelines    He reports that he has never smoked. He has never used smokeless tobacco.      Appropriate preventive services were discussed with this patient, including applicable screening as appropriate for cardiovascular disease, diabetes, osteopenia/osteoporosis, and glaucoma.  As appropriate for age/gender, discussed screening for colorectal cancer, prostate cancer, breast cancer, and cervical cancer. Checklist reviewing preventive services available has been given to the patient.    Reviewed patients plan of " care and provided an AVS. The Basic Care Plan (routine screening as documented in Health Maintenance) for Connor meets the Care Plan requirement. This Care Plan has been established and reviewed with the Patient.    Counseling Resources:  ATP IV Guidelines  Pooled Cohorts Equation Calculator  Breast Cancer Risk Calculator  Breast Cancer: Medication to Reduce Risk  FRAX Risk Assessment  ICSI Preventive Guidelines  Dietary Guidelines for Americans, 2010  [employer] MyPlate  ASA Prophylaxis  Lung CA Screening    Roverto Edwards MD,   [facility]    Patient Instructions     Great job on weight reduction. Even though weight loss has not been purposeful, no signs of illness apparent to cause your weight to go down.   Diabetes looks great. Recommend eye exam with [facility] yearly.   Blood pressure great. Awaiting lab results.     Refilled needed meds.     Okay to see me in a year, sooner if problems.

## 2022-10-23 ENCOUNTER — HEALTH MAINTENANCE LETTER (OUTPATIENT)
Age: 77
End: 2022-10-23

## 2022-12-24 ENCOUNTER — APPOINTMENT (OUTPATIENT)
Dept: GENERAL RADIOLOGY | Facility: CLINIC | Age: 77
End: 2022-12-24
Attending: PHYSICIAN ASSISTANT
Payer: OTHER MISCELLANEOUS

## 2022-12-24 ENCOUNTER — HOSPITAL ENCOUNTER (EMERGENCY)
Facility: CLINIC | Age: 77
Discharge: HOME | End: 2022-12-24
Attending: PHYSICIAN ASSISTANT | Admitting: PHYSICIAN ASSISTANT
Payer: OTHER MISCELLANEOUS

## 2022-12-24 VITALS
SYSTOLIC BLOOD PRESSURE: 114 MMHG | TEMPERATURE: 96 F | BODY MASS INDEX: 30.83 KG/M2 | HEART RATE: 114 BPM | WEIGHT: 204.6 LBS | RESPIRATION RATE: 18 BRPM | DIASTOLIC BLOOD PRESSURE: 98 MMHG | OXYGEN SATURATION: 99 %

## 2022-12-24 DIAGNOSIS — S42.035A CLOSED NONDISPLACED FRACTURE OF ACROMIAL END OF LEFT CLAVICLE, INITIAL ENCOUNTER: ICD-10-CM

## 2022-12-24 PROCEDURE — 23500 CLTX CLAVICULAR FX W/O MNPJ: CPT | Mod: LT

## 2022-12-24 PROCEDURE — 73030 X-RAY EXAM OF SHOULDER: CPT | Mod: LT

## 2022-12-24 PROCEDURE — 99283 EMERGENCY DEPT VISIT LOW MDM: CPT | Mod: 25

## 2022-12-24 ASSESSMENT — ENCOUNTER SYMPTOMS
NECK PAIN: 0
ARTHRALGIAS: 1
BACK PAIN: 0

## 2022-12-24 ASSESSMENT — ACTIVITIES OF DAILY LIVING (ADL): ADLS_ACUITY_SCORE: 35

## 2022-12-24 NOTE — ED TRIAGE NOTES
Presents to ED with c/o fall resulting in pain to L shoulder. Patient slipped on ice in the parking lot of his job causing him to fall and land on L shoulder. Denies hitting head or LOC, no thinner use.      Triage Assessment     Row Name 12/24/22 0577       Triage Assessment (Adult)    Airway WDL WDL       Respiratory WDL    Respiratory WDL WDL       Cardiac WDL    Cardiac WDL X;rhythm    Pulse Rate & Regularity tachycardic

## 2022-12-24 NOTE — ED PROVIDER NOTES
History   Chief Complaint:  Fall and Shoulder Pain       The history is provided by the patient.      Connor Srinivasan is a 77 year old male with history of osteoarthritis, hypertension and hyperlipidemia who presents following a fall on the ice, landing on his left shoulder. Denies hitting head or losing consciousness. Denies blood thinner use. He fell onto the curb beneath a layer of snow 2.5 hours ago (1255). Denies other injuries. Denies neck pain, rib pain, chest pain or back pain. Notes some congestion.     Review of Systems   HENT: Positive for congestion.    Cardiovascular: Negative for chest pain.   Musculoskeletal: Positive for arthralgias (left shoulder). Negative for back pain and neck pain.   All other systems reviewed and are negative.      Allergies:  The patient has no known allergies.     Medications:  Aspirin 81 mg   Tenormin  Cetirizine  Hydrodiuril  Zocor  Cialis  Trueplus lancets  Lisinopril     Past Medical History:     Hypertension   Osteoarthritis   Chronic rhinitis  ED  Hyperlipidemia   Diabetes mellitus, type 2     Past Surgical History:    Colonoscopy   Excision pilonidal lesion simple  Phacoemulsification clear cornea with lens implant  Arthroplasty, right and left knee     Family History:    Mother- heart disease  Father- CAD  Sister- arthritis    Social History:  The patient presents to the ED alone via private vehicle.  PCP: Roverto Edwards     Physical Exam     Patient Vitals for the past 24 hrs:   BP Temp Temp src Pulse Resp SpO2 Weight   12/24/22 1615 -- -- -- 114 -- -- --   12/24/22 1612 (!) 114/98 (!) 96  F (35.6  C) Temporal -- 18 99 % 92.8 kg (204 lb 9.6 oz)       Physical Exam  General: Well appearing.  Non-toxic    Head:  Atraumatic, external ears and nose normal.    Neck:  Normal range of motion without rigidity.    CV:  Regular rate and rhythm    No pathologic murmur, rubs, or gallops.    Resp:  Breath sounds are clear bilaterally.  No crackles, wheezes,  rhonchi.    Non-labored, no retractions or accessory muscle use.     MS:  Tenderness to palpation over the lateral portion of the left clavicle and AC joint.  No gross deformity or tenting of the skin.  Able to range shoulder fully without significant difficulty.  No SC joint tenderness.  No sternal, rib, or scapular tenderness.  No midline spinal tenderness.  No bruising to the chest wall.  Normal ROM in elbow wrist and fingers.      Neuro:  Normal strength and sensation at elbows, and radial, median, ulnar nerve distributions of hands Bl.    Skin:  2+ brachial and radial pulses.  Normal cap refill.  No rashes, fluctuance, or crepitance.    Psych: Alert, oriented.  Appropriate interactions.            Emergency Department Course   Imaging:  XR Shoulder Left G/E 3 Views   Final Result   IMPRESSION: Acute minimally displaced distal left clavicular fracture. Probable nondisplaced intra-articular extension into the AC joint. Glenohumeral joint space is maintained.        Report per radiology    Emergency Department Course:       Reviewed:  I reviewed nursing notes, vitals, past medical history and Care Everywhere    Assessments:  1620 I obtained history and examined the patient as noted above.   1703 I rechecked the patient and explained findings.     Disposition:  The patient was discharged to home.     Impression & Plan   Medical Decision Makin year old male presents with left shoulder pain after mechanical fall.  X-rays demonstrate evidence of distal left clavicular fracture.  Patient was given a sling to immobilize.  CMS intact, no evidence of neurovascular compromise.  No indication for emergent reduction or orthopedic consultation from the ED. no evidence of head or neck or intrathoracic injury.  Head to toe trauma exam otherwise non-concerning.  Discussed orthopedic follow-up and patient will call to schedule appointment for follow-up in 7-14 days.  Discussed pain control and early range of motion starting  in a few days to help avoid frozen shoulder.  Strict return precautions given for signs of neurovascular compromise other new/worsening sx and these were provided in writing.    Diagnosis:    ICD-10-CM    1. Closed nondisplaced fracture of acromial end of left clavicle, initial encounter  S42.035A           Discharge Medications:  New Prescriptions    No medications on file       Scribe Disclosure:  I, Iliana Alexander, am serving as a scribe at 4:17 PM on 12/24/2022 to document services personally performed by Burton Fraga PA based on my observations and the provider's statements to me.          Burton Fraga PA-C  12/24/22 0625

## 2023-03-05 ENCOUNTER — OFFICE VISIT (OUTPATIENT)
Dept: URGENT CARE | Facility: URGENT CARE | Age: 78
End: 2023-03-05
Payer: COMMERCIAL

## 2023-03-05 VITALS
DIASTOLIC BLOOD PRESSURE: 73 MMHG | HEART RATE: 73 BPM | WEIGHT: 207 LBS | BODY MASS INDEX: 31.19 KG/M2 | RESPIRATION RATE: 16 BRPM | OXYGEN SATURATION: 100 % | TEMPERATURE: 97.5 F | SYSTOLIC BLOOD PRESSURE: 119 MMHG

## 2023-03-05 DIAGNOSIS — H60.392 INFECTIVE OTITIS EXTERNA, LEFT: ICD-10-CM

## 2023-03-05 DIAGNOSIS — H66.002 ACUTE SUPPURATIVE OTITIS MEDIA OF LEFT EAR WITHOUT SPONTANEOUS RUPTURE OF TYMPANIC MEMBRANE, RECURRENCE NOT SPECIFIED: Primary | ICD-10-CM

## 2023-03-05 PROCEDURE — 99213 OFFICE O/P EST LOW 20 MIN: CPT | Performed by: FAMILY MEDICINE

## 2023-03-05 RX ORDER — AMOXICILLIN 500 MG/1
500 CAPSULE ORAL 2 TIMES DAILY
Qty: 20 CAPSULE | Refills: 0 | Status: SHIPPED | OUTPATIENT
Start: 2023-03-05 | End: 2023-03-15

## 2023-03-05 RX ORDER — NEOMYCIN SULFATE, POLYMYXIN B SULFATE AND HYDROCORTISONE 10; 3.5; 1 MG/ML; MG/ML; [USP'U]/ML
3 SUSPENSION/ DROPS AURICULAR (OTIC) 4 TIMES DAILY
Qty: 6 ML | Refills: 0 | Status: SHIPPED | OUTPATIENT
Start: 2023-03-05 | End: 2023-03-15

## 2023-03-05 NOTE — PROGRESS NOTES
SUBJECTIVE:Connor Srinivasan is a 77 year old male who presents with left ear pain for day(s).     Past Medical History:   Diagnosis Date     Cellulitis of left lower extremity 07/2021     Chronic rhinitis      Localized osteoarthrosis not specified whether primary or secondary, lower leg      Onychomycoses      Other and unspecified hyperlipidemia      Unspecified essential hypertension      Allergies   Allergen Reactions     No Known Drug Allergies      Social History     Tobacco Use     Smoking status: Never     Smokeless tobacco: Never   Substance Use Topics     Alcohol use: Yes     Comment: rarely       ROS: CONSTITUTIONAL:NEGATIVE for fever, chills, change in weight    OBJECTIVE:  /73 (BP Location: Left arm, Patient Position: Sitting, Cuff Size: Adult Large)   Pulse 73   Temp 97.5  F (36.4  C) (Oral)   Resp 16   Wt 93.9 kg (207 lb)   SpO2 100%   BMI 31.19 kg/m       The left TM is erythematous  The left auditory canal is erythematous, swollen, tender  Oropharynx exam is normal: no lesions, erythema, adenopathy or exudate.GENERAL: no acute distress  EYES: EOMI,  PERRL, conjunctiva clear  SKIN: no suspicious lesions or rashes       ICD-10-CM    1. Acute suppurative otitis media of left ear without spontaneous rupture of tympanic membrane, recurrence not specified  H66.002 amoxicillin (AMOXIL) 500 MG capsule      2. Infective otitis externa, left  H60.392 neomycin-polymyxin-hydrocortisone (CORTISPORIN) 3.5-61818-2 otic suspension          F/U PCP/IM/FP/UC if worse, not any better

## 2023-03-30 ENCOUNTER — APPOINTMENT (OUTPATIENT)
Dept: GENERAL RADIOLOGY | Facility: CLINIC | Age: 78
End: 2023-03-30
Attending: EMERGENCY MEDICINE
Payer: COMMERCIAL

## 2023-03-30 ENCOUNTER — APPOINTMENT (OUTPATIENT)
Dept: CT IMAGING | Facility: CLINIC | Age: 78
End: 2023-03-30
Attending: EMERGENCY MEDICINE
Payer: COMMERCIAL

## 2023-03-30 ENCOUNTER — HOSPITAL ENCOUNTER (EMERGENCY)
Facility: CLINIC | Age: 78
Discharge: HOME | End: 2023-03-30
Attending: EMERGENCY MEDICINE | Admitting: EMERGENCY MEDICINE
Payer: COMMERCIAL

## 2023-03-30 VITALS
DIASTOLIC BLOOD PRESSURE: 79 MMHG | SYSTOLIC BLOOD PRESSURE: 136 MMHG | HEART RATE: 89 BPM | RESPIRATION RATE: 20 BRPM | OXYGEN SATURATION: 96 % | TEMPERATURE: 98 F

## 2023-03-30 DIAGNOSIS — S20.229A CONTUSION OF BACK, UNSPECIFIED LATERALITY, INITIAL ENCOUNTER: ICD-10-CM

## 2023-03-30 DIAGNOSIS — S09.90XA CLOSED HEAD INJURY, INITIAL ENCOUNTER: ICD-10-CM

## 2023-03-30 DIAGNOSIS — S01.01XA LACERATION OF SCALP, INITIAL ENCOUNTER: ICD-10-CM

## 2023-03-30 PROCEDURE — 99284 EMERGENCY DEPT VISIT MOD MDM: CPT | Mod: 25

## 2023-03-30 PROCEDURE — 71046 X-RAY EXAM CHEST 2 VIEWS: CPT

## 2023-03-30 PROCEDURE — 70450 CT HEAD/BRAIN W/O DYE: CPT | Mod: MA

## 2023-03-30 PROCEDURE — 12001 RPR S/N/AX/GEN/TRNK 2.5CM/<: CPT

## 2023-03-30 RX ORDER — LIDOCAINE HYDROCHLORIDE AND EPINEPHRINE 10; 10 MG/ML; UG/ML
INJECTION, SOLUTION INFILTRATION; PERINEURAL
Status: DISCONTINUED
Start: 2023-03-30 | End: 2023-03-31 | Stop reason: HOSPADM

## 2023-03-30 ASSESSMENT — ENCOUNTER SYMPTOMS
NECK PAIN: 0
FEVER: 0
WOUND: 1
DIARRHEA: 0
COUGH: 0
ARTHRALGIAS: 1
VOMITING: 0

## 2023-03-31 NOTE — DISCHARGE INSTRUCTIONS
Discharge Instructions  Laceration (Cut)    You were seen today for a laceration (cut).  Your provider examined your laceration for any problems such a buried foreign body (like glass, a splinter, or gravel), or injury to blood vessels, tendons, and nerves.  Your provider may have also rinsed and/or scrubbed your laceration to help prevent an infection. It may not be possible to find all problems with your laceration on the first visit; occasionally foreign bodies or a tendon injury can go undetected.    Your laceration may have been closed in one of several ways:  No closure: many wounds will heal just fine without closure.  Stitches: regular stitches that require removal.  Staples: skin staples are often used in the scalp/head.  Wound adhesive (glue): skin glue can be used for certain lacerations and doesn t require removal.  Wound strips (aka Butterfly bandages or steri-strips): these are bandages that help to close a wound.  Absorbable stitches:  dissolving  stitches that go away on their own and usually don t require removal.    A small percentage of wounds will develop an infection regardless of how well the wound is cared for. Antibiotics are generally not indicated to prevent an infection so are only given for a small number of high-risk wounds. Some lacerations are too high risk to close, and are left open to heal because closure can increase the likelihood that an infection will develop.    Remember that all lacerations, no matter how expertly repaired, will cause scarring. We consider many factors, techniques, and materials, in our efforts to provide the best possible cosmetic outcome.    Generally, every Emergency Department visit should have a follow-up clinic visit with either a primary or a specialty clinic/provider. Please follow-up as instructed by your emergency provider today.     Return to the Emergency Department right away if:  You have more redness, swelling, pain, drainage (pus), a bad smell,  or red streaking from your laceration as these symptoms could indicate an infection.  You have a fever of 100.4 F or more.  You have bleeding that you cannot stop at home. If your cut starts to bleed, hold pressure on the bleeding area with a clean cloth or put pressure over the bandage.  If the bleeding does not stop after using constant pressure for 30 minutes, you should return to the Emergency Department for further treatment.  An area past the laceration is cool, pale, or blue compared with the other side, or has a slower return of color when squeezed.  Your dressing seems too tight or starts to get uncomfortable or painful. For children, signs of a problem might be irritability or restlessness.  You have loss of normal function or use of an area, such as being unable to straighten or bend a finger normally.  You have a numb area past the laceration.    Return to the Emergency Department or see your regular provider if:  The laceration starts to come open.   You have something coming out of the cut or a feeling that there is something in the laceration.  Your wound will not heal, or keeps breaking open. There can always be glass, wood, dirt or other things in any wound.  They will not always show up, even on x-rays.  If a wound does not heal, this may be why, and it is important to follow-up with your regular provider.    Home Care:  Take your dressing off in 12-24 hours, or as instructed by your provider, to check your laceration. Remove the dressing sooner if it seems too tight or painful, or if it is getting numb, tingly, or pale past the dressing.  Gently wash your laceration 1-2 times daily with clean water and mild soap. It is okay to shower or run clean water over the laceration, but do not let the laceration soak in water (no swimming).  If your laceration was closed with wound adhesive or strips: pat it dry and leave it open to the air. For all other repairs: after you wash your laceration, or at least  2 times a day, apply antibiotic ointment (such as Neosporin  or Bacitracin ) to the laceration, then cover it with a Band-Aid  or gauze.  Keep the laceration clean. Wear gloves or other protective clothing if you are around dirt.    Follow-up for removal:  If your wound was closed with staples or regular stitches, they need to be removed according to the instructions and timeline specified by your provider today.  If your wound was closed with absorbable ( dissolving ) sutures, they should fall out, dissolve, or not be visible in about one week. If they are still visible, then they should be removed according to the instructions and timeline specified by your provider today.    Scars:  To help minimize scarring:  Wear sunscreen over the healed laceration when out in the sun.  Massage the area regularly once healed.  You may apply Vitamin E to the healed wound.  Wait. Scars improve in appearance over months and years.    If you were given a prescription for medicine here today, be sure to read all of the information (including the package insert) that comes with your prescription.  This will include important information about the medicine, its side effects, and any warnings that you need to know about.  The pharmacist who fills the prescription can provide more information and answer questions you may have about the medicine.  If you have questions or concerns that the pharmacist cannot address, please call or return to the Emergency Department.       Remember that you can always come back to the Emergency Department if you are not able to see your regular provider in the amount of time listed above, if you get any new symptoms, or if there is anything that worries you.      Discharge Instructions  Head Injury    You have been seen today for a head injury. Your evaluation included a history and physical examination. You may have had a CT (CAT) scan performed, though most head injuries do not require a scan. Based on  this evaluation, your provider today does not feel that your head injury is serious.    Generally, every Emergency Department visit should have a follow-up clinic visit with either a primary or a specialty clinic/provider. Please follow-up as instructed by your emergency provider today.  Return to the Emergency Department if:  You are confused or you are not acting right.  Your headache gets worse or you start to have a really bad headache even with your recommended treatment plan.  You vomit (throw up) more than once.  You have a seizure.  You have trouble walking.  You have weakness or paralysis (cannot move) in an arm or a leg.  You have blood or fluid coming from your ears or nose.  You have new symptoms or anything that worries you.    Sleeping:  It is okay for you to sleep, but someone should wake you up if instructed by your provider, and someone should check on you at your usual time to wake up.     Activity:  Do not drive for at least 24 hours.  Do not drive if you have dizzy spells or trouble concentrating, or remembering things.  Do not return to any contact sports until cleared by your regular provider.     MORE INFORMATION:    Concussion:  A concussion is a minor head injury that may cause temporary problems with the way the brain works. Although concussions are important, they are generally not an emergency or a reason that a person needs to be hospitalized. Some concussion symptoms include confusion, amnesia (forgetful), nausea (sick to your stomach) and vomiting (throwing up), dizziness, fatigue, memory or concentration problems, irritability and sleep problems. For most people, concussions are mild and temporary but some will have more severe and persistent symptoms that require on-going care and treatment.  CT Scans: Your evaluation today may have included a CT scan (CAT scan) to look for things like bleeding or a skull fracture (broken bone).  CT scans involve radiation and too many CT scans can  cause serious health problems like cancer, especially in children.  Because of this, your provider may not have ordered a CT scan today if they think you are at low risk for a serious or life threatening problem.    If you were given a prescription for medicine here today, be sure to read all of the information (including the package insert) that comes with your prescription.  This will include important information about the medicine, its side effects, and any warnings that you need to know about.  The pharmacist who fills the prescription can provide more information and answer questions you may have about the medicine.  If you have questions or concerns that the pharmacist cannot address, please call or return to the Emergency Department.     Remember that you can always come back to the Emergency Department if you are not able to see your regular provider in the amount of time listed above, if you get any new symptoms, or if there is anything that worries you.    Discharge Instructions  Back Pain  You were seen today for back pain. Back pain can have many causes, but most will get better without surgery or other specific treatment. Sometimes there is a herniated ( slipped ) disc. We do not usually do MRI scans to look for these right away, since most herniated discs will get better on their own with time.  Today, we did not find any evidence that your back pain was caused by a serious condition. However, sometimes symptoms develop over time and cannot be found during an emergency visit, so it is very important that you follow up with your primary provider.  Generally, every Emergency Department visit should have a follow-up clinic visit with either a primary or a specialty clinic/provider. Please follow-up as instructed by your emergency provider today.    Return to the Emergency Department if:  You develop a fever with your back pain.   You have weakness or change in sensation in one or both legs.  You lose  control of your bowels or bladder, or cannot empty your bladder (cannot pee).  Your pain gets much worse.     Follow-up with your provider:  Unless your pain has completely gone away, please make an appointment with your provider within one week. Most of the routine care for back pain is available in a clinic and not the Emergency Department. You may need further management of your back pain, such as more pain medication, imaging such as an X-ray or MRI, or physical therapy.    What can I do to help myself?  Remain Active -- People are often afraid that they will hurt their back further or delay recovery by remaining active, but this is one of the best things you can do for your back. In fact, staying in bed for a long time to rest is not recommended. Studies have shown that people with low back pain recover faster when they remain active. Movement helps to bring blood flow to the muscles and relieve muscle spasms as well as preventing loss of muscle strength.  Heat -- Using a heating pad can help with low back pain during the first few weeks. Do not sleep with a heating pad, as you can be burned.   Pain medications - You may take a pain medication such as Tylenol  (acetaminophen), Advil , Motrin  (ibuprofen) or Aleve  (naproxen).  If you were given a prescription for medicine here today, be sure to read all of the information (including the package insert) that comes with your prescription.  This will include important information about the medicine, its side effects, and any warnings that you need to know about.  The pharmacist who fills the prescription can provide more information and answer questions you may have about the medicine.  If you have questions or concerns that the pharmacist cannot address, please call or return to the Emergency Department.   Remember that you can always come back to the Emergency Department if you are not able to see your regular provider in the amount of time listed above, if you  get any new symptoms, or if there is anything that worries you.
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
## 2023-04-02 ENCOUNTER — HEALTH MAINTENANCE LETTER (OUTPATIENT)
Age: 78
End: 2023-04-02

## 2023-04-03 ENCOUNTER — TELEPHONE (OUTPATIENT)
Dept: INTERNAL MEDICINE | Facility: CLINIC | Age: 78
End: 2023-04-03
Payer: COMMERCIAL

## 2023-04-03 NOTE — TELEPHONE ENCOUNTER
Reason for Call:  Appointment Request    Patient requesting this type of appt:  Staple removal    Requested provider: Roverto Edwards    Reason patient unable to be scheduled: Not within requested timeframe    When does patient want to be seen/preferred time: 3-7 days    Comments: removal by 10 days, was given jim in [place] on 3/30/23    Could we send this information to you in [employer] or would you prefer to receive a phone call?:   Patient would like to be contacted via [employer]    Call taken on 4/3/2023 at 12:48 PM by Edna Cope

## 2023-04-11 ENCOUNTER — OFFICE VISIT (OUTPATIENT)
Dept: INTERNAL MEDICINE | Facility: CLINIC | Age: 78
End: 2023-04-11
Payer: COMMERCIAL

## 2023-04-11 VITALS
TEMPERATURE: 97.7 F | BODY MASS INDEX: 29.86 KG/M2 | RESPIRATION RATE: 18 BRPM | WEIGHT: 197 LBS | DIASTOLIC BLOOD PRESSURE: 62 MMHG | HEART RATE: 73 BPM | SYSTOLIC BLOOD PRESSURE: 128 MMHG | HEIGHT: 68 IN | OXYGEN SATURATION: 97 %

## 2023-04-11 DIAGNOSIS — S09.90XD TRAUMATIC INJURY OF HEAD, SUBSEQUENT ENCOUNTER: Primary | ICD-10-CM

## 2023-04-11 DIAGNOSIS — S01.01XS LACERATION OF SCALP, SEQUELA: ICD-10-CM

## 2023-04-11 DIAGNOSIS — E11.9 TYPE 2 DIABETES MELLITUS WITHOUT COMPLICATION, WITHOUT LONG-TERM CURRENT USE OF INSULIN (H): ICD-10-CM

## 2023-04-11 LAB — HBA1C MFR BLD: 5.7 % (ref 0–5.6)

## 2023-04-11 PROCEDURE — 83036 HEMOGLOBIN GLYCOSYLATED A1C: CPT | Performed by: INTERNAL MEDICINE

## 2023-04-11 PROCEDURE — 99213 OFFICE O/P EST LOW 20 MIN: CPT | Performed by: INTERNAL MEDICINE

## 2023-04-11 PROCEDURE — 36415 COLL VENOUS BLD VENIPUNCTURE: CPT | Performed by: INTERNAL MEDICINE

## 2023-04-11 NOTE — PATIENT INSTRUCTIONS
No reason to postpone return to usual duties with no restrictions.   Let me know if any further concerns.     Stop by the lab today to update A1c.    Otherwise, see me back for Annual Wellness Exam in September 2023.

## 2023-04-11 NOTE — PROGRESS NOTES
"  Assessment & Plan   (S09.90XD) Traumatic injury of head, subsequent encounter  (primary encounter diagnosis)  Comment: No evidence of associated neurologic or cardiac pathology. No evidence of post-concussive symptoms.   Plan: Okay to resume responsibilities with [employer] or regular employment without restrictions.     (S01.01XS) Laceration of scalp, sequela  Comment: Well-healed, staples removed.    (E11.9) Type 2 diabetes mellitus without complication, without long-term current use of insulin (H)  Comment: Update A1c.   Plan: HEMOGLOBIN A1C, PRIMARY CARE FOLLOW-UP         SCHEDULING            BMI:   Estimated body mass index is 29.86 kg/m  as calculated from the following:    Height as of this encounter: 1.73 m (5' 8.11\").    Weight as of this encounter: 89.4 kg (197 lb).       Patient Instructions   No reason to postpone return to usual duties with no restrictions.   Let me know if any further concerns.     Stop by the lab today to update A1c.    Otherwise, see me back for Annual Wellness Exam in September 2023.       Roverto Edwards MD,   [facility] TACO [place] is a 78 year old, presenting for the following health issues:  ER F/U        4/11/2023     9:48 AM   Additional Questions   Roomed by Camille SUBRAMANIAN CMA     [facility]     ED/UC Followup:    Facility:  [facility] ED  Date of visit: 3/30/2023  Reason for visit: fall  Current Status: improved    The patient was practicing a \"brachial stun\" procedure with a colleague at a [employer] training on 3/30, when he was witnessed to fall backward and land on the back of his head. Eyewitness and camera footage failed to determine for certain whether the patient lost his balance or had any evidence of something else being wrong with him. He was felt to have perhaps momentarily lost consciousness with some degree of amnestic response for the event itself.     ED evaluation showed a negative head CT and chest X-ray. He has " "had some mild discomfort in his right posterior chest wall without bruising.   He did require placement of 4 staples to a scalp laceration.    He denies any recollection of recent chest discomfort, palpitations or dizziness. Also no recollection of any recent amaurosis, diplopia, dysarthria or aphasia, lateralizing weakness or paresthesias.   He denies having any problems with his speech or thinking, nausea or headaches since the fall.     Glucoses have been very well controlled, although he is due to have an A1c updated.     Past medical, family and social histories as well as medications reviewed and updated as needed.    Review of Systems   REVIEW OF SYSTEMS: The following systems have been completely reviewed and are negative except as noted above:   Constitutional, HEENT, respiratory, cardiovascular, musculoskeletal, dermatologic, endocrine, psychiatric, and neurologic systems.        Objective    /62 (BP Location: Left arm, Patient Position: Sitting, Cuff Size: Adult Large)   Pulse 73   Temp 97.7  F (36.5  C) (Tympanic)   Resp 18   Ht 1.73 m (5' 8.11\")   Wt 89.4 kg (197 lb)   SpO2 97%   BMI 29.86 kg/m    Body mass index is 29.86 kg/m .     Physical Exam   GENERAL: healthy, alert and no distress  EYES: Eyes grossly normal to inspection, PERRL and conjunctivae and sclerae normal  Head: right parietal scalp laceration well-healed, no redness, swelling or drainage. 4 staples removed.   RESP: No significant right posterolateral visible or palpable deformity.No tenderness.   Lungs clear to auscultation - no rales, rhonchi or wheezes  CV: regular rate and rhythm, normal S1 S2, no S3 or S4, no murmur, click or rub, no peripheral edema and peripheral pulses strong  MS: no gross musculoskeletal defects noted, no edema  NEURO: Normal strength and tone, mentation intact and speech normal             "

## 2023-10-09 ENCOUNTER — DOCUMENTATION ONLY (OUTPATIENT)
Dept: OTHER | Facility: CLINIC | Age: 78
End: 2023-10-09
Payer: COMMERCIAL

## 2023-11-05 ENCOUNTER — HEALTH MAINTENANCE LETTER (OUTPATIENT)
Age: 78
End: 2023-11-05

## 2023-11-28 ASSESSMENT — ENCOUNTER SYMPTOMS
DIARRHEA: 1
FEVER: 0
CONSTIPATION: 0
JOINT SWELLING: 0
EYE PAIN: 0
WEAKNESS: 0
NERVOUS/ANXIOUS: 0
ARTHRALGIAS: 0
HEARTBURN: 0
HEMATURIA: 0
SORE THROAT: 0
DYSURIA: 0
HEADACHES: 0
PARESTHESIAS: 0
CHILLS: 0
ABDOMINAL PAIN: 0
PALPITATIONS: 0
COUGH: 0
MYALGIAS: 0
DIZZINESS: 0
HEMATOCHEZIA: 0
FREQUENCY: 0
SHORTNESS OF BREATH: 0
NAUSEA: 0

## 2023-11-28 ASSESSMENT — ACTIVITIES OF DAILY LIVING (ADL): CURRENT_FUNCTION: NO ASSISTANCE NEEDED

## 2023-12-01 ENCOUNTER — OFFICE VISIT (OUTPATIENT)
Dept: INTERNAL MEDICINE | Facility: CLINIC | Age: 78
End: 2023-12-01
Attending: INTERNAL MEDICINE
Payer: COMMERCIAL

## 2023-12-01 VITALS
WEIGHT: 188 LBS | BODY MASS INDEX: 28.49 KG/M2 | RESPIRATION RATE: 18 BRPM | OXYGEN SATURATION: 96 % | SYSTOLIC BLOOD PRESSURE: 130 MMHG | DIASTOLIC BLOOD PRESSURE: 62 MMHG | HEART RATE: 64 BPM | TEMPERATURE: 98.1 F | HEIGHT: 68 IN

## 2023-12-01 DIAGNOSIS — Z00.00 ENCOUNTER FOR MEDICARE ANNUAL WELLNESS EXAM: Primary | ICD-10-CM

## 2023-12-01 DIAGNOSIS — E78.5 HYPERLIPIDEMIA LDL GOAL <130: ICD-10-CM

## 2023-12-01 DIAGNOSIS — N52.9 IMPOTENCE OF ORGANIC ORIGIN: ICD-10-CM

## 2023-12-01 DIAGNOSIS — Z12.5 SCREENING PSA (PROSTATE SPECIFIC ANTIGEN): ICD-10-CM

## 2023-12-01 DIAGNOSIS — Z00.00 ROUTINE GENERAL MEDICAL EXAMINATION AT A HEALTH CARE FACILITY: ICD-10-CM

## 2023-12-01 DIAGNOSIS — E11.9 TYPE 2 DIABETES MELLITUS WITHOUT COMPLICATION, WITHOUT LONG-TERM CURRENT USE OF INSULIN (H): ICD-10-CM

## 2023-12-01 DIAGNOSIS — Z29.11 NEED FOR VACCINATION AGAINST RESPIRATORY SYNCYTIAL VIRUS: ICD-10-CM

## 2023-12-01 DIAGNOSIS — I10 ESSENTIAL HYPERTENSION: ICD-10-CM

## 2023-12-01 DIAGNOSIS — M79.10 MYALGIA: ICD-10-CM

## 2023-12-01 LAB
BASOPHILS # BLD AUTO: 0 10E3/UL (ref 0–0.2)
BASOPHILS NFR BLD AUTO: 0 %
EOSINOPHIL # BLD AUTO: 0.1 10E3/UL (ref 0–0.7)
EOSINOPHIL NFR BLD AUTO: 2 %
ERYTHROCYTE [DISTWIDTH] IN BLOOD BY AUTOMATED COUNT: 13.2 % (ref 10–15)
HBA1C MFR BLD: 5.2 % (ref 0–5.6)
HCT VFR BLD AUTO: 43.8 % (ref 40–53)
HGB BLD-MCNC: 14.5 G/DL (ref 13.3–17.7)
IMM GRANULOCYTES # BLD: 0 10E3/UL
IMM GRANULOCYTES NFR BLD: 0 %
LYMPHOCYTES # BLD AUTO: 0.6 10E3/UL (ref 0.8–5.3)
LYMPHOCYTES NFR BLD AUTO: 17 %
MCH RBC QN AUTO: 33 PG (ref 26.5–33)
MCHC RBC AUTO-ENTMCNC: 33.1 G/DL (ref 31.5–36.5)
MCV RBC AUTO: 100 FL (ref 78–100)
MONOCYTES # BLD AUTO: 0.6 10E3/UL (ref 0–1.3)
MONOCYTES NFR BLD AUTO: 17 %
NEUTROPHILS # BLD AUTO: 2.1 10E3/UL (ref 1.6–8.3)
NEUTROPHILS NFR BLD AUTO: 64 %
PLATELET # BLD AUTO: 117 10E3/UL (ref 150–450)
RBC # BLD AUTO: 4.4 10E6/UL (ref 4.4–5.9)
WBC # BLD AUTO: 3.3 10E3/UL (ref 4–11)

## 2023-12-01 PROCEDURE — 83036 HEMOGLOBIN GLYCOSYLATED A1C: CPT | Performed by: INTERNAL MEDICINE

## 2023-12-01 PROCEDURE — G0103 PSA SCREENING: HCPCS | Performed by: INTERNAL MEDICINE

## 2023-12-01 PROCEDURE — 99207 PR FOOT EXAM NO CHARGE: CPT | Performed by: INTERNAL MEDICINE

## 2023-12-01 PROCEDURE — 99214 OFFICE O/P EST MOD 30 MIN: CPT | Mod: 25 | Performed by: INTERNAL MEDICINE

## 2023-12-01 PROCEDURE — 82550 ASSAY OF CK (CPK): CPT | Performed by: INTERNAL MEDICINE

## 2023-12-01 PROCEDURE — 84443 ASSAY THYROID STIM HORMONE: CPT | Performed by: INTERNAL MEDICINE

## 2023-12-01 PROCEDURE — 36415 COLL VENOUS BLD VENIPUNCTURE: CPT | Performed by: INTERNAL MEDICINE

## 2023-12-01 PROCEDURE — G0439 PPPS, SUBSEQ VISIT: HCPCS | Performed by: INTERNAL MEDICINE

## 2023-12-01 PROCEDURE — 85025 COMPLETE CBC W/AUTO DIFF WBC: CPT | Performed by: INTERNAL MEDICINE

## 2023-12-01 PROCEDURE — 82570 ASSAY OF URINE CREATININE: CPT | Performed by: INTERNAL MEDICINE

## 2023-12-01 PROCEDURE — 80053 COMPREHEN METABOLIC PANEL: CPT | Performed by: INTERNAL MEDICINE

## 2023-12-01 PROCEDURE — 82043 UR ALBUMIN QUANTITATIVE: CPT | Performed by: INTERNAL MEDICINE

## 2023-12-01 PROCEDURE — 83735 ASSAY OF MAGNESIUM: CPT | Performed by: INTERNAL MEDICINE

## 2023-12-01 PROCEDURE — 80061 LIPID PANEL: CPT | Performed by: INTERNAL MEDICINE

## 2023-12-01 RX ORDER — SIMVASTATIN 40 MG
40 TABLET ORAL AT BEDTIME
Qty: 90 TABLET | Refills: 3 | Status: SHIPPED | OUTPATIENT
Start: 2023-12-01

## 2023-12-01 RX ORDER — HYDROCHLOROTHIAZIDE 25 MG/1
25 TABLET ORAL DAILY
Qty: 90 TABLET | Refills: 3 | Status: SHIPPED | OUTPATIENT
Start: 2023-12-01

## 2023-12-01 RX ORDER — ATENOLOL 50 MG/1
50 TABLET ORAL DAILY
Qty: 90 TABLET | Refills: 3 | Status: SHIPPED | OUTPATIENT
Start: 2023-12-01

## 2023-12-01 RX ORDER — RESPIRATORY SYNCYTIAL VIRUS VACCINE 120MCG/0.5
0.5 KIT INTRAMUSCULAR ONCE
Qty: 1 EACH | Refills: 0 | Status: CANCELLED | OUTPATIENT
Start: 2023-12-01 | End: 2023-12-01

## 2023-12-01 RX ORDER — TADALAFIL 20 MG/1
TABLET ORAL
Qty: 20 TABLET | Refills: 3 | Status: SHIPPED | OUTPATIENT
Start: 2023-12-01 | End: 2024-02-08

## 2023-12-01 ASSESSMENT — ENCOUNTER SYMPTOMS
MYALGIAS: 0
NERVOUS/ANXIOUS: 0
NAUSEA: 0
ARTHRALGIAS: 0
JOINT SWELLING: 0
SHORTNESS OF BREATH: 0
DIARRHEA: 1
HEADACHES: 0
DYSURIA: 0
SORE THROAT: 0
ABDOMINAL PAIN: 0
CHILLS: 0
FREQUENCY: 0
DIZZINESS: 0
HEMATOCHEZIA: 0
CONSTIPATION: 0
PARESTHESIAS: 0
PALPITATIONS: 0
HEARTBURN: 0
WEAKNESS: 0
COUGH: 0
EYE PAIN: 0
HEMATURIA: 0
FEVER: 0

## 2023-12-01 ASSESSMENT — LIFESTYLE VARIABLES
HOW OFTEN DO YOU HAVE SIX OR MORE DRINKS ON ONE OCCASION: NEVER
HOW OFTEN DO YOU HAVE A DRINK CONTAINING ALCOHOL: MONTHLY OR LESS
SKIP TO QUESTIONS 9-10: 1
AUDIT-C TOTAL SCORE: 1
HOW MANY STANDARD DRINKS CONTAINING ALCOHOL DO YOU HAVE ON A TYPICAL DAY: 1 OR 2

## 2023-12-01 ASSESSMENT — ACTIVITIES OF DAILY LIVING (ADL): CURRENT_FUNCTION: NO ASSISTANCE NEEDED

## 2023-12-01 NOTE — PROGRESS NOTES
"SUBJECTIVE:   Felix is a 78 year old, presenting for the following:  Medicare Visit        12/1/2023    10:38 AM   Additional Questions   Roomed by Camille SBURAMANIAN CMA       Are you in the first 12 months of your Medicare coverage?  No    Healthy Habits:     In general, how would you rate your overall health?  Excellent    Frequency of exercise:  None    Do you usually eat at least 4 servings of fruit and vegetables a day, include whole grains    & fiber and avoid regularly eating high fat or \"junk\" foods?  No    Taking medications regularly:  Yes    Medication side effects:  None    Ability to successfully perform activities of daily living:  No assistance needed    Home Safety:  No safety concerns identified    Hearing Impairment:  No hearing concerns    In the past 6 months, have you been bothered by leaking of urine?  No    In general, how would you rate your overall mental or emotional health?  Excellent    Additional concerns today:  Yes      Today's PHQ-2 Score:       12/1/2023     7:46 AM   PHQ-2 ( 1999 [employer])   Q1: Little interest or pleasure in doing things 0   Q2: Feeling down, depressed or hopeless 0   PHQ-2 Score 0   Q1: Little interest or pleasure in doing things Not at all   Q2: Feeling down, depressed or hopeless Not at all   PHQ-2 Score 0             Have you ever done Advance Care Planning? (For example, a Health Directive, POLST, or a discussion with a medical provider or your loved ones about your wishes): Yes, advance care planning is on file.       Fall risk  Fallen 2 or more times in the past year?: Yes  Any fall with injury in the past year?: Yes    Cognitive Screening   1) Repeat 3 items (Leader, Season, Table)    2) Clock draw: NORMAL  3) 3 item recall: Recalls 3 objects  Results: 3 items recalled: COGNITIVE IMPAIRMENT LESS LIKELY    Mini-CogTM Copyright S Yadi. Licensed by the author for use in [facility]; reprinted with permission (zoey@.[facility]). All rights reserved.      Do you " have sleep apnea, excessive snoring or daytime drowsiness? : no    Reviewed and updated as needed this visit by clinical staff   Tobacco  Allergies  Meds              Reviewed and updated as needed this visit by Provider                 Social History     Tobacco Use    Smoking status: Never    Smokeless tobacco: Never   Substance Use Topics    Alcohol use: Yes     Comment: Very light use             11/28/2023    10:40 AM   Alcohol Use   Prescreen: >3 drinks/day or >7 drinks/week? Not Applicable     Do you have a current opioid prescription? No  Do you use any other controlled substances or medications that are not prescribed by a provider? None      PROBLEMS TO ADD ON....In addition to an Annual Wellness Exam, we addressed diabetes mellitus, hyperlipidemia, hypertension, erectile dysfunction.      The patient is tolerating his current medications without any adverse effects.  He has been successful in keeping off the substantial amount of weight that he has lost in recent years.   A1c and LDL-Cholesterol have historically been well controlled.   Blood pressure appears satisfactorily controlled.   He requests refills of all meds including Cialis which has worked well for him.   We agreed to update needed labs.      Current providers sharing in care for this patient include:   Patient Care Team:  Roverto Edwards MD as PCP - General  Roverto Edwards MD as Assigned PCP    The following health maintenance items are reviewed in Epic and correct as of today:  Health Maintenance   Topic Date Due    RSV VACCINE (Pregnancy & 60+) (1 - 1-dose 60+ series) Never done    EYE EXAM  07/14/2021    MEDICARE ANNUAL WELLNESS VISIT  09/07/2023    BMP  09/07/2023    LIPID  09/07/2023    MICROALBUMIN  09/07/2023    DIABETIC FOOT EXAM  09/07/2023    ANNUAL REVIEW OF HM ORDERS  09/07/2023    A1C  10/11/2023    FALL RISK ASSESSMENT  12/01/2024    DTAP/TDAP/TD IMMUNIZATION (2 - Td or Tdap) 04/21/2026    ADVANCE CARE PLANNING   "10/09/2028    HEPATITIS C SCREENING  Completed    PHQ-2 (once per calendar year)  Completed    INFLUENZA VACCINE  Completed    Pneumococcal Vaccine: 65+ Years  Completed    ZOSTER IMMUNIZATION  Completed    COVID-19 Vaccine  Completed    IPV IMMUNIZATION  Aged Out    HPV IMMUNIZATION  Aged Out    MENINGITIS IMMUNIZATION  Aged Out    RSV MONOCLONAL ANTIBODY  Aged Out    COLORECTAL CANCER SCREENING  Discontinued           Pneumococcal vaccinations.  Patient has received both pneumonia vaccinations (Prevnar-13 and Pneumovax-23)       Review of Systems   Constitutional:  Negative for chills and fever.   HENT:  Negative for congestion, ear pain, hearing loss and sore throat.    Eyes:  Negative for pain and visual disturbance.   Respiratory:  Negative for cough and shortness of breath.    Cardiovascular:  Negative for chest pain, palpitations and peripheral edema.   Gastrointestinal:  Positive for diarrhea. Negative for abdominal pain, constipation, heartburn, hematochezia and nausea.   Genitourinary:  Positive for impotence. Negative for dysuria, frequency, genital sores, hematuria, penile discharge and urgency.   Musculoskeletal:  Negative for arthralgias, joint swelling and myalgias.   Skin:  Negative for rash.   Neurological:  Negative for dizziness, weakness, headaches and paresthesias.   Psychiatric/Behavioral:  Negative for mood changes. The patient is not nervous/anxious.        OBJECTIVE:   /62 (BP Location: Left arm, Patient Position: Sitting, Cuff Size: Adult Large)   Pulse 64   Temp 98.1  F (36.7  C) (Oral)   Resp 18   Ht 1.727 m (5' 8\")   Wt 85.3 kg (188 lb)   SpO2 96%   BMI 28.59 kg/m   Estimated body mass index is 28.59 kg/m  as calculated from the following:    Height as of this encounter: 1.727 m (5' 8\").    Weight as of this encounter: 85.3 kg (188 lb).    Physical Exam  GENERAL: healthy, alert and no distress  EYES: Eyes grossly normal to inspection, PERRL and conjunctivae and sclerae " normal  HENT: ear canals and TM's normal, nose and mouth without ulcers or lesions  NECK: no adenopathy, no asymmetry, masses, or scars and thyroid normal to palpation  RESP: lungs clear to auscultation - no rales, rhonchi or wheezes  CV: regular rate and rhythm, normal S1 S2, no S3 or S4, no murmur, click or rub, no peripheral edema and peripheral pulses strong  ABDOMEN: soft, nontender, no hepatosplenomegaly, no masses and bowel sounds normal  MS: no gross musculoskeletal defects noted, no edema  SKIN: no suspicious lesions or rashes  NEURO: Normal strength and tone, mentation intact and speech normal  PSYCH: mentation appears normal, affect normal/bright  Diabetic foot exam: normal DP and PT pulses, no trophic changes or ulcerative lesions, and normal sensory exam    Diagnostic Test Results:  Labs reviewed in Epic    ASSESSMENT / PLAN:   (Z00.00) Encounter for Medicare annual wellness exam  (primary encounter diagnosis)  Comment: Stable health. See epic orders.     (E11.9) Type 2 diabetes mellitus without complication, without long-term current use of insulin (H)  Comment: Well controlled. Continue current meds. Update needed labs.   Plan: Lipid panel reflex to direct LDL Non-fasting,         Albumin Random Urine Quantitative with Creat         Ratio, HEMOGLOBIN A1C, OFFICE/OUTPT         VISIT,EST,LEVL III, FOOT EXAM          (I10) Essential hypertension  Comment: BP at target. Continue current meds.   Plan: OFFICE/OUTPT VISIT,EST,LEVL III, atenolol         (TENORMIN) 50 MG tablet, hydrochlorothiazide         (HYDRODIURIL) 25 MG tablet, CANCELED: BASIC         METABOLIC PANEL          (E78.5) Hyperlipidemia LDL goal <130  Comment: Update lipids. Continue current meds.   Plan: OFFICE/OUTPT VISIT,EST,LEVL III, simvastatin         (ZOCOR) 40 MG tablet          (N52.9) ERECTILE DYSFUNCTION  Comment: Refilled Cialis.   Plan: tadalafil (CIALIS) 20 MG tablet          (M79.10) Myalgia  Comment: See epic orders.   Plan:  "Magnesium, CK total          (Z00.00) Routine general medical examination at a health care facility  Plan: TSH with free T4 reflex, CBC with platelets and        differential, Comprehensive metabolic panel         (BMP + Alb, Alk Phos, ALT, AST, Total. Bili,         TP), PSA, screen          (Z12.5) Screening PSA (prostate specific antigen)      Patient has been advised of split billing requirements and indicates understanding: Yes      COUNSELING:  Reviewed preventive health counseling, as reflected in patient instructions      BMI:   Estimated body mass index is 28.59 kg/m  as calculated from the following:    Height as of this encounter: 1.727 m (5' 8\").    Weight as of this encounter: 85.3 kg (188 lb).         He reports that he has never smoked. He has never used smokeless tobacco.      Appropriate preventive services were discussed with this patient, including applicable screening as appropriate for fall prevention, nutrition, physical activity, Tobacco-use cessation, weight loss and cognition.  Checklist reviewing preventive services available has been given to the patient.    Reviewed patients plan of care and provided an AVS. The Basic Care Plan (routine screening as documented in Health Maintenance) for Connor meets the Care Plan requirement. This Care Plan has been established and reviewed with the Patient.          Roverto Edwards MD,   [facility]    Identified Health Risks:  I have reviewed Opioid Use Disorder and Substance Use Disorder risk factors and made any needed referrals. He is at risk for lack of exercise and has been provided with information to increase physical activity for the benefit of his well-being.  The patient was counseled and encouraged to consider modifying their diet and eating habits. He was provided with information on recommended healthy diet options.  "

## 2023-12-01 NOTE — PATIENT INSTRUCTIONS
"Patient Education   Personalized Prevention Plan  You are due for the preventive services outlined below.  Your care team is available to assist you in scheduling these services.  If you have already completed any of these items, please share that information with your care team to update in your medical record.  Health Maintenance Due   Topic Date Due    RSV VACCINE (Pregnancy & 60+) (1 - 1-dose 60+ series) Never done    Eye Exam  07/14/2021    Basic Metabolic Panel  09/07/2023    Cholesterol Lab  09/07/2023    Kidney Microalbumin Urine Test  09/07/2023    Diabetic Foot Exam  09/07/2023    ANNUAL REVIEW OF HM ORDERS  09/07/2023     Learning About Being Physically Active  What is physical activity?     Being physically active means doing any kind of activity that gets your body moving.  The types of physical activity that can help you get fit and stay healthy include:  Aerobic or \"cardio\" activities. These make your heart beat faster and make you breathe harder, such as brisk walking, riding a bike, or running. They strengthen your heart and lungs and build up your endurance.  Strength training activities. These make your muscles work against, or \"resist,\" something. Examples include lifting weights or doing push-ups. These activities help tone and strengthen your muscles and bones.  Stretches. These let you move your joints and muscles through their full range of motion. Stretching helps you be more flexible.  Reaching a balance between these three types of physical activity is important because each one contributes to your overall fitness.  What are the benefits of being active?  Being active is one of the best things you can do for your health. It helps you to:  Feel stronger and have more energy to do all the things you like to do.  Focus better at school or work.  Feel, think, and sleep better.  Reach and stay at a healthy weight.  Lose fat and build lean muscle.  Lower your risk for serious health problems, " "including diabetes, heart attack, high blood pressure, and some cancers.  Keep your heart, lungs, bones, muscles, and joints strong and healthy.  How can you make being active part of your life?  Start slowly. Make it your long-term goal to get at least 30 minutes of exercise on most days of the week. Walking is a good choice. You also may want to do other activities, such as running, swimming, cycling, or playing tennis or team sports.  Pick activities that you like--ones that make your heart beat faster, your muscles stronger, and your muscles and joints more flexible. If you find more than one thing you like doing, do them all. You don't have to do the same thing every day.  Get your heart pumping every day. Any activity that makes your heart beat faster and keeps it at that rate for a while counts.  Here are some great ways to get your heart beating faster:  Go for a brisk walk, run, or hike.  Go for a swim or bike ride.  Take an online exercise class or dance.  Play a game of touch football, basketball, or soccer.  Play tennis, pickleball, or racquetball.  Climb stairs.  Even some household chores can be aerobic. Just do them at a faster pace. Raking or mowing the lawn, sweeping the garage, and vacuuming and cleaning your home all can help get your heart rate up.  Strengthen your muscles during the week. You don't have to lift heavy weights or grow big, bulky muscles to get stronger. Doing a few simple activities that make your muscles work against, or \"resist,\" something can help you get stronger. Aim for at least twice a week.  For example, you can:  Do push-ups or sit-ups, which use your own body weight as resistance.  Lift weights or dumbbells or use stretch bands at home or in a gym or community center.  Stretch your muscles often. Stretching will help you as you become more active. It can help you stay flexible and loosen tight muscles. It can also help improve your balance and posture and can be a great " "way to relax.  Be sure to stretch the muscles you'll be using when you work out. It's best to warm your muscles slightly before you stretch them. Walk or do some other light aerobic activity for a few minutes. Then start stretching.  When you stretch your muscles:  Do it slowly. Stretching is not about going fast or making sudden movements.  Don't push or bounce during a stretch.  Hold each stretch for at least 15 to 30 seconds, if you can. You should feel a stretch in the muscle, but not pain.  Breathe out as you do the stretch. Then breathe in as you hold the stretch. Don't hold your breath.  If you're worried about how more activity might affect your health, have a checkup before you start. Follow any special advice your doctor gives you for getting a smart start.  Where can you learn more?  Go to https://www.GetTaxi.mnlakeplace.com/patiented  Enter W332 in the search box to learn more about \"Learning About Being Physically Active.\"  Current as of: June 6, 2023               Content Version: 13.8    7250-4205 [employer].   Care instructions adapted under license by your healthcare professional. If you have questions about a medical condition or this instruction, always ask your healthcare professional. [employer] disclaims any warranty or liability for your use of this information.      Learning About Dietary Guidelines  What are the Dietary Guidelines for Americans?     Dietary Guidelines for Americans provide tips for eating well and staying healthy. This helps reduce the risk for long-term (chronic) diseases.  These guidelines recommend that you:  Eat and drink the right amount for you. The U.S. government's food guide is called MyPlate. It can help you make your own well-balanced eating plan.  Try to balance your eating with your activity. This helps you stay at a healthy weight.  Drink alcohol in moderation, if at all.  Limit foods high in salt, saturated fat, trans fat, and added " "sugar.  These guidelines are from the U.S. Department of Agriculture and the U.S. Department of Health and Human Services. They are updated every 5 years.  What is MyPlate?  MyPlate is the U.S. government's food guide. It can help you make your own well-balanced eating plan. A balanced eating plan means that you eat enough, but not too much, and that your food gives you the nutrients you need to stay healthy.  MyPlate focuses on eating plenty of whole grains, fruits, and vegetables, and on limiting fat and sugar. It is available online at www.ChooseMyPlate.gov.  How can you get started?  If you're trying to eat healthier, you can slowly change your eating habits over time. You don't have to make big changes all at once. Start by adding one or two healthy foods to your meals each day.  Grains  Choose whole-grain breads and cereals and whole-wheat pasta and whole-grain crackers.  Vegetables  Eat a variety of vegetables every day. They have lots of nutrients and are part of a heart-healthy diet.  Fruits  Eat a variety of fruits every day. Fruits contain lots of nutrients. Choose fresh fruit instead of fruit juice.  Protein foods  Choose fish and lean poultry more often. Eat red meat and fried meats less often. Dried beans, tofu, and nuts are also good sources of protein.  Dairy  Choose low-fat or fat-free products from this food group. If you have problems digesting milk, try eating cheese or yogurt instead.  Fats and oils  Limit fats and oils if you're trying to cut calories. Choose healthy fats when you cook. These include canola oil and olive oil.  Where can you learn more?  Go to https://www.[employer].net/patiented  Enter D676 in the search box to learn more about \"Learning About Dietary Guidelines.\"  Current as of: February 28, 2023               Content Version: 13.8    3769-6803 [employer].   Care instructions adapted under license by your healthcare professional. If you have questions about a " medical condition or this instruction, always ask your healthcare professional. [employer], [employer] disclaims any warranty or liability for your use of this information.      Everything looks fine!    Refills of medications have been faxed to your pharmacy.     Lab results will be available soon on [facility].    See me in a year, sooner if problems.

## 2023-12-02 LAB
ALBUMIN SERPL BCG-MCNC: 4.1 G/DL (ref 3.5–5.2)
ALP SERPL-CCNC: 149 U/L (ref 40–150)
ALT SERPL W P-5'-P-CCNC: 70 U/L (ref 0–70)
ANION GAP SERPL CALCULATED.3IONS-SCNC: 11 MMOL/L (ref 7–15)
AST SERPL W P-5'-P-CCNC: 49 U/L (ref 0–45)
BILIRUB SERPL-MCNC: 0.7 MG/DL
BUN SERPL-MCNC: 25.8 MG/DL (ref 8–23)
CALCIUM SERPL-MCNC: 9 MG/DL (ref 8.8–10.2)
CHLORIDE SERPL-SCNC: 108 MMOL/L (ref 98–107)
CHOLEST SERPL-MCNC: 110 MG/DL
CK SERPL-CCNC: 162 U/L (ref 39–308)
CREAT SERPL-MCNC: 0.64 MG/DL (ref 0.67–1.17)
CREAT UR-MCNC: 113 MG/DL
DEPRECATED HCO3 PLAS-SCNC: 23 MMOL/L (ref 22–29)
EGFRCR SERPLBLD CKD-EPI 2021: >90 ML/MIN/1.73M2
GLUCOSE SERPL-MCNC: 102 MG/DL (ref 70–99)
HDLC SERPL-MCNC: 29 MG/DL
LDLC SERPL CALC-MCNC: 69 MG/DL
MAGNESIUM SERPL-MCNC: 2.4 MG/DL (ref 1.7–2.3)
MICROALBUMIN UR-MCNC: 15.8 MG/L
MICROALBUMIN/CREAT UR: 13.98 MG/G CR (ref 0–17)
NONHDLC SERPL-MCNC: 81 MG/DL
POTASSIUM SERPL-SCNC: 3.6 MMOL/L (ref 3.4–5.3)
PROT SERPL-MCNC: 6.2 G/DL (ref 6.4–8.3)
PSA SERPL DL<=0.01 NG/ML-MCNC: 0.6 NG/ML (ref 0–6.5)
SODIUM SERPL-SCNC: 142 MMOL/L (ref 135–145)
TRIGL SERPL-MCNC: 62 MG/DL
TSH SERPL DL<=0.005 MIU/L-ACNC: 2.01 UIU/ML (ref 0.3–4.2)

## 2024-01-15 ENCOUNTER — MYC REFILL (OUTPATIENT)
Dept: INTERNAL MEDICINE | Facility: CLINIC | Age: 79
End: 2024-01-15
Payer: MEDICARE

## 2024-01-15 DIAGNOSIS — E11.9 TYPE 2 DIABETES MELLITUS WITHOUT COMPLICATION, WITHOUT LONG-TERM CURRENT USE OF INSULIN (H): ICD-10-CM

## 2024-01-15 RX ORDER — CALCIUM CITRATE/VITAMIN D3 200MG-6.25
TABLET ORAL
Qty: 100 STRIP | Refills: 1 | Status: SHIPPED | OUTPATIENT
Start: 2024-01-15 | End: 2024-10-03

## 2024-02-08 DIAGNOSIS — N52.9 IMPOTENCE OF ORGANIC ORIGIN: ICD-10-CM

## 2024-02-08 RX ORDER — TADALAFIL 20 MG/1
TABLET ORAL
Qty: 20 TABLET | Refills: 0 | Status: SHIPPED | OUTPATIENT
Start: 2024-02-08 | End: 2024-02-27

## 2024-02-27 DIAGNOSIS — N52.9 IMPOTENCE OF ORGANIC ORIGIN: ICD-10-CM

## 2024-02-27 RX ORDER — TADALAFIL 20 MG/1
TABLET ORAL
Qty: 20 TABLET | Refills: 1 | Status: SHIPPED | OUTPATIENT
Start: 2024-02-27

## 2024-05-21 ENCOUNTER — TRANSFERRED RECORDS (OUTPATIENT)
Dept: HEALTH INFORMATION MANAGEMENT | Facility: CLINIC | Age: 79
End: 2024-05-21
Payer: MEDICARE

## 2024-05-21 LAB — RETINOPATHY: NEGATIVE

## 2024-06-02 ENCOUNTER — HEALTH MAINTENANCE LETTER (OUTPATIENT)
Age: 79
End: 2024-06-02

## 2024-08-26 NOTE — TELEPHONE ENCOUNTER
Last OV-4/21/16    Last FLP-4/21/16    Last Basic Metabolic Panel:  Lab Results   Component Value Date     04/21/2016      Lab Results   Component Value Date    POTASSIUM 4.5 04/21/2016     Lab Results   Component Value Date    CHLORIDE 107 04/21/2016     Lab Results   Component Value Date    BARON 8.9 04/21/2016     Lab Results   Component Value Date    CO2 28 04/21/2016     Lab Results   Component Value Date    BUN 20 04/21/2016     Lab Results   Component Value Date    CR 0.82 04/21/2016     Lab Results   Component Value Date     04/21/2016     Pt sched for appt on 5/26  
Message from MyChart:  Original authorizing provider: Roverto Edwards MD, MD [place] [facility] Srinivasan would like a refill of the following medications:  simvastatin (ZOCOR) 40 MG tablet [Roverto Edwards MD, MD]  fluticasone (FLONASE) 50 MCG/ACT nasal spray [Roverto Edwards MD, MD]  atenolol (TENORMIN) 50 MG tablet [Roverto Edwards MD, MD]  hydrochlorothiazide (HYDRODIURIL) 25 MG tablet [Roverto Edwards MD, MD]    Preferred pharmacy: [id number] [address] [address]    Comment:  I have only a week's worth left. I have a appointment with Roverto Edwards on Fri May 26 for annual physical. I just need enough to make the appointment date.  
No

## 2024-10-03 ENCOUNTER — MYC REFILL (OUTPATIENT)
Dept: INTERNAL MEDICINE | Facility: CLINIC | Age: 79
End: 2024-10-03
Payer: COMMERCIAL

## 2024-10-03 DIAGNOSIS — E11.9 TYPE 2 DIABETES MELLITUS WITHOUT COMPLICATION, WITHOUT LONG-TERM CURRENT USE OF INSULIN (H): ICD-10-CM

## 2024-10-03 RX ORDER — CALCIUM CITRATE/VITAMIN D3 200MG-6.25
TABLET ORAL
Qty: 100 STRIP | Refills: 0 | Status: SHIPPED | OUTPATIENT
Start: 2024-10-03 | End: 2024-11-11

## 2024-11-11 ENCOUNTER — HOSPITAL ENCOUNTER (OUTPATIENT)
Facility: CLINIC | Age: 79
Setting detail: OBSERVATION
Discharge: HOME | End: 2024-11-12
Attending: EMERGENCY MEDICINE | Admitting: INTERNAL MEDICINE
Payer: MEDICARE

## 2024-11-11 ENCOUNTER — APPOINTMENT (OUTPATIENT)
Dept: CARDIOLOGY | Facility: CLINIC | Age: 79
End: 2024-11-11
Attending: INTERNAL MEDICINE
Payer: MEDICARE

## 2024-11-11 ENCOUNTER — APPOINTMENT (OUTPATIENT)
Dept: GENERAL RADIOLOGY | Facility: CLINIC | Age: 79
End: 2024-11-11
Attending: EMERGENCY MEDICINE
Payer: MEDICARE

## 2024-11-11 DIAGNOSIS — R79.89 ELEVATED TROPONIN: ICD-10-CM

## 2024-11-11 DIAGNOSIS — D62 ANEMIA DUE TO BLOOD LOSS, ACUTE: ICD-10-CM

## 2024-11-11 DIAGNOSIS — I95.9 TRANSIENT HYPOTENSION: ICD-10-CM

## 2024-11-11 DIAGNOSIS — I83.892 BLEEDING FROM VARICOSE VEINS OF LOWER EXTREMITY, LEFT: ICD-10-CM

## 2024-11-11 LAB
ABO/RH(D): NORMAL
ALBUMIN UR-MCNC: 20 MG/DL
ANION GAP SERPL CALCULATED.3IONS-SCNC: 13 MMOL/L (ref 7–15)
ANTIBODY SCREEN: NEGATIVE
APPEARANCE UR: CLEAR
ATRIAL RATE - MUSE: 68 BPM
BASOPHILS # BLD AUTO: 0 10E3/UL (ref 0–0.2)
BASOPHILS NFR BLD AUTO: 0 %
BILIRUB UR QL STRIP: NEGATIVE
BUN SERPL-MCNC: 24.1 MG/DL (ref 8–23)
CALCIUM SERPL-MCNC: 7.4 MG/DL (ref 8.8–10.4)
CHLORIDE SERPL-SCNC: 108 MMOL/L (ref 98–107)
COLOR UR AUTO: YELLOW
CREAT SERPL-MCNC: 0.83 MG/DL (ref 0.67–1.17)
D DIMER PPP FEU-MCNC: <0.27 UG/ML FEU (ref 0–0.5)
DIASTOLIC BLOOD PRESSURE - MUSE: NORMAL MMHG
EGFRCR SERPLBLD CKD-EPI 2021: 89 ML/MIN/1.73M2
EOSINOPHIL # BLD AUTO: 0.1 10E3/UL (ref 0–0.7)
EOSINOPHIL NFR BLD AUTO: 2 %
ERYTHROCYTE [DISTWIDTH] IN BLOOD BY AUTOMATED COUNT: 12.9 % (ref 10–15)
GLUCOSE SERPL-MCNC: 134 MG/DL (ref 70–99)
GLUCOSE UR STRIP-MCNC: NEGATIVE MG/DL
HCO3 SERPL-SCNC: 21 MMOL/L (ref 22–29)
HCT VFR BLD AUTO: 41 % (ref 40–53)
HGB BLD-MCNC: 11.5 G/DL (ref 13.3–17.7)
HGB BLD-MCNC: 12.8 G/DL (ref 13.3–17.7)
HGB BLD-MCNC: 13.1 G/DL (ref 13.3–17.7)
HGB UR QL STRIP: NEGATIVE
HOLD SPECIMEN: NORMAL
HYALINE CASTS: 11 /LPF
IMM GRANULOCYTES # BLD: 0 10E3/UL
IMM GRANULOCYTES NFR BLD: 0 %
INTERPRETATION ECG - MUSE: NORMAL
KETONES UR STRIP-MCNC: NEGATIVE MG/DL
LEUKOCYTE ESTERASE UR QL STRIP: NEGATIVE
LVEF ECHO: NORMAL
LYMPHOCYTES # BLD AUTO: 0.8 10E3/UL (ref 0.8–5.3)
LYMPHOCYTES NFR BLD AUTO: 20 %
MCH RBC QN AUTO: 32 PG (ref 26.5–33)
MCHC RBC AUTO-ENTMCNC: 32 G/DL (ref 31.5–36.5)
MCV RBC AUTO: 100 FL (ref 78–100)
MONOCYTES # BLD AUTO: 0.7 10E3/UL (ref 0–1.3)
MONOCYTES NFR BLD AUTO: 16 %
MUCOUS THREADS #/AREA URNS LPF: PRESENT /LPF
NEUTROPHILS # BLD AUTO: 2.7 10E3/UL (ref 1.6–8.3)
NEUTROPHILS NFR BLD AUTO: 63 %
NITRATE UR QL: NEGATIVE
NRBC # BLD AUTO: 0 10E3/UL
NRBC BLD AUTO-RTO: 0 /100
P AXIS - MUSE: 45 DEGREES
PH UR STRIP: 5.5 [PH] (ref 5–7)
PLATELET # BLD AUTO: 116 10E3/UL (ref 150–450)
POTASSIUM SERPL-SCNC: 3.5 MMOL/L (ref 3.4–5.3)
PR INTERVAL - MUSE: 384 MS
PROCALCITONIN SERPL IA-MCNC: 0.04 NG/ML
QRS DURATION - MUSE: 82 MS
QT - MUSE: 412 MS
QTC - MUSE: 438 MS
R AXIS - MUSE: -18 DEGREES
RBC # BLD AUTO: 4.09 10E6/UL (ref 4.4–5.9)
RBC URINE: 1 /HPF
SODIUM SERPL-SCNC: 142 MMOL/L (ref 135–145)
SP GR UR STRIP: 1.02 (ref 1–1.03)
SPECIMEN EXPIRATION DATE: NORMAL
SQUAMOUS EPITHELIAL: <1 /HPF
SYSTOLIC BLOOD PRESSURE - MUSE: NORMAL MMHG
T AXIS - MUSE: 17 DEGREES
TROPONIN T SERPL HS-MCNC: 127 NG/L
TROPONIN T SERPL HS-MCNC: 132 NG/L
TROPONIN T SERPL HS-MCNC: 84 NG/L
UROBILINOGEN UR STRIP-MCNC: 2 MG/DL
VENTRICULAR RATE- MUSE: 68 BPM
WBC # BLD AUTO: 4.2 10E3/UL (ref 4–11)
WBC URINE: 1 /HPF

## 2024-11-11 PROCEDURE — 85018 HEMOGLOBIN: CPT | Performed by: EMERGENCY MEDICINE

## 2024-11-11 PROCEDURE — 93005 ELECTROCARDIOGRAM TRACING: CPT

## 2024-11-11 PROCEDURE — 80048 BASIC METABOLIC PNL TOTAL CA: CPT | Performed by: EMERGENCY MEDICINE

## 2024-11-11 PROCEDURE — 250N000013 HC RX MED GY IP 250 OP 250 PS 637: Performed by: INTERNAL MEDICINE

## 2024-11-11 PROCEDURE — 84484 ASSAY OF TROPONIN QUANT: CPT | Performed by: EMERGENCY MEDICINE

## 2024-11-11 PROCEDURE — 71046 X-RAY EXAM CHEST 2 VIEWS: CPT

## 2024-11-11 PROCEDURE — 999N000208 ECHOCARDIOGRAM COMPLETE

## 2024-11-11 PROCEDURE — 99222 1ST HOSP IP/OBS MODERATE 55: CPT | Performed by: INTERNAL MEDICINE

## 2024-11-11 PROCEDURE — 99285 EMERGENCY DEPT VISIT HI MDM: CPT | Mod: 25

## 2024-11-11 PROCEDURE — 36415 COLL VENOUS BLD VENIPUNCTURE: CPT | Performed by: INTERNAL MEDICINE

## 2024-11-11 PROCEDURE — 84145 PROCALCITONIN (PCT): CPT | Performed by: EMERGENCY MEDICINE

## 2024-11-11 PROCEDURE — 86900 BLOOD TYPING SEROLOGIC ABO: CPT | Performed by: INTERNAL MEDICINE

## 2024-11-11 PROCEDURE — G0378 HOSPITAL OBSERVATION PER HR: HCPCS

## 2024-11-11 PROCEDURE — 85018 HEMOGLOBIN: CPT | Performed by: INTERNAL MEDICINE

## 2024-11-11 PROCEDURE — 81001 URINALYSIS AUTO W/SCOPE: CPT | Performed by: EMERGENCY MEDICINE

## 2024-11-11 PROCEDURE — 96361 HYDRATE IV INFUSION ADD-ON: CPT

## 2024-11-11 PROCEDURE — C8929 TTE W OR WO FOL WCON,DOPPLER: HCPCS

## 2024-11-11 PROCEDURE — 84484 ASSAY OF TROPONIN QUANT: CPT | Performed by: INTERNAL MEDICINE

## 2024-11-11 PROCEDURE — 96360 HYDRATION IV INFUSION INIT: CPT

## 2024-11-11 PROCEDURE — 85025 COMPLETE CBC W/AUTO DIFF WBC: CPT | Performed by: EMERGENCY MEDICINE

## 2024-11-11 PROCEDURE — 86901 BLOOD TYPING SEROLOGIC RH(D): CPT | Performed by: INTERNAL MEDICINE

## 2024-11-11 PROCEDURE — 36415 COLL VENOUS BLD VENIPUNCTURE: CPT | Performed by: EMERGENCY MEDICINE

## 2024-11-11 PROCEDURE — 85379 FIBRIN DEGRADATION QUANT: CPT | Performed by: EMERGENCY MEDICINE

## 2024-11-11 PROCEDURE — 93306 TTE W/DOPPLER COMPLETE: CPT | Mod: 26 | Performed by: INTERNAL MEDICINE

## 2024-11-11 PROCEDURE — 258N000003 HC RX IP 258 OP 636: Performed by: EMERGENCY MEDICINE

## 2024-11-11 PROCEDURE — 255N000002 HC RX 255 OP 636: Performed by: INTERNAL MEDICINE

## 2024-11-11 RX ORDER — POLYETHYLENE GLYCOL 3350 17 G/17G
17 POWDER, FOR SOLUTION ORAL DAILY
Status: DISCONTINUED | OUTPATIENT
Start: 2024-11-11 | End: 2024-11-12 | Stop reason: HOSPADM

## 2024-11-11 RX ORDER — ASPIRIN 81 MG/1
81 TABLET ORAL DAILY
Status: DISCONTINUED | OUTPATIENT
Start: 2024-11-11 | End: 2024-11-12 | Stop reason: HOSPADM

## 2024-11-11 RX ORDER — ATENOLOL 25 MG/1
50 TABLET ORAL DAILY
Status: DISCONTINUED | OUTPATIENT
Start: 2024-11-11 | End: 2024-11-12 | Stop reason: HOSPADM

## 2024-11-11 RX ORDER — ACETAMINOPHEN 325 MG/1
650 TABLET ORAL EVERY 4 HOURS PRN
Status: DISCONTINUED | OUTPATIENT
Start: 2024-11-11 | End: 2024-11-12 | Stop reason: HOSPADM

## 2024-11-11 RX ORDER — AMOXICILLIN 250 MG
1 CAPSULE ORAL 2 TIMES DAILY PRN
Status: DISCONTINUED | OUTPATIENT
Start: 2024-11-11 | End: 2024-11-12 | Stop reason: HOSPADM

## 2024-11-11 RX ORDER — ONDANSETRON 2 MG/ML
4 INJECTION INTRAMUSCULAR; INTRAVENOUS EVERY 6 HOURS PRN
Status: DISCONTINUED | OUTPATIENT
Start: 2024-11-11 | End: 2024-11-12 | Stop reason: HOSPADM

## 2024-11-11 RX ORDER — ONDANSETRON 4 MG/1
4 TABLET, ORALLY DISINTEGRATING ORAL EVERY 6 HOURS PRN
Status: DISCONTINUED | OUTPATIENT
Start: 2024-11-11 | End: 2024-11-12 | Stop reason: HOSPADM

## 2024-11-11 RX ORDER — CETIRIZINE HYDROCHLORIDE 10 MG/1
10 TABLET ORAL DAILY
COMMUNITY

## 2024-11-11 RX ORDER — HYDROCHLOROTHIAZIDE 25 MG/1
25 TABLET ORAL DAILY
Status: DISCONTINUED | OUTPATIENT
Start: 2024-11-11 | End: 2024-11-12 | Stop reason: HOSPADM

## 2024-11-11 RX ORDER — AMOXICILLIN 250 MG
2 CAPSULE ORAL 2 TIMES DAILY PRN
Status: DISCONTINUED | OUTPATIENT
Start: 2024-11-11 | End: 2024-11-12 | Stop reason: HOSPADM

## 2024-11-11 RX ORDER — SIMVASTATIN 40 MG
40 TABLET ORAL AT BEDTIME
Status: DISCONTINUED | OUTPATIENT
Start: 2024-11-11 | End: 2024-11-12 | Stop reason: HOSPADM

## 2024-11-11 RX ORDER — ACETAMINOPHEN 650 MG/1
650 SUPPOSITORY RECTAL EVERY 4 HOURS PRN
Status: DISCONTINUED | OUTPATIENT
Start: 2024-11-11 | End: 2024-11-12 | Stop reason: HOSPADM

## 2024-11-11 RX ADMIN — HYDROCHLOROTHIAZIDE 25 MG: 25 TABLET ORAL at 20:13

## 2024-11-11 RX ADMIN — SODIUM CHLORIDE 1000 ML: 9 INJECTION, SOLUTION INTRAVENOUS at 04:34

## 2024-11-11 RX ADMIN — SIMVASTATIN 40 MG: 40 TABLET, FILM COATED ORAL at 20:13

## 2024-11-11 RX ADMIN — ASPIRIN 81 MG: 81 TABLET, COATED ORAL at 11:23

## 2024-11-11 RX ADMIN — SODIUM CHLORIDE 1000 ML: 9 INJECTION, SOLUTION INTRAVENOUS at 03:34

## 2024-11-11 RX ADMIN — HUMAN ALBUMIN MICROSPHERES AND PERFLUTREN 2 ML: 10; .22 INJECTION, SOLUTION INTRAVENOUS at 10:32

## 2024-11-11 RX ADMIN — ATENOLOL 50 MG: 25 TABLET ORAL at 20:13

## 2024-11-11 ASSESSMENT — COLUMBIA-SUICIDE SEVERITY RATING SCALE - C-SSRS
6. HAVE YOU EVER DONE ANYTHING, STARTED TO DO ANYTHING, OR PREPARED TO DO ANYTHING TO END YOUR LIFE?: NO
1. IN THE PAST MONTH, HAVE YOU WISHED YOU WERE DEAD OR WISHED YOU COULD GO TO SLEEP AND NOT WAKE UP?: NO
2. HAVE YOU ACTUALLY HAD ANY THOUGHTS OF KILLING YOURSELF IN THE PAST MONTH?: NO

## 2024-11-11 ASSESSMENT — ACTIVITIES OF DAILY LIVING (ADL)
ADLS_ACUITY_SCORE: 0

## 2024-11-11 NOTE — ED TRIAGE NOTES
Pt BIBA from home picked a scab on left lower leg and began spurting blood. EMS est 200ML blood on scene. Patient called EMS and reports feeling dizzy while standing. 173BG 18g RAC, takes aspirin daily. Leg wrapped, bleeding controlled.

## 2024-11-11 NOTE — ED PROVIDER NOTES
Emergency Department Note      History of Present Illness     Chief Complaint   Bleeding from left leg    HPI   Connor Srinivasan is a 79 year old male with a history of hypertension, hyperlipidemia, and diabetes mellitus 2 amongst others who presents to the ED with bleeding. He states that earlier this evening he scratched a scab over a varicose vein on his left ankle. Immediately afterward the site began to spurt blood and he was unable to manage the bleeding at home. He takes 81mg Aspirin daily, denies use of other blood thinning medications.    He denies chest pain, shortness of breath, headache, fevers, cough or any other systemic symptoms.  No vomiting or diarrhea.    Independent Historian   None    Review of External Notes   I reviewed Care Everywhere and updated Epic    Past Medical History     Medical History and Problem List   Chronic rhinitis  First degree AV block  Hyperlipidemia  Hypertension  Osteoarthritis  Type 2 diabetes mellitus    Medications   Aspirin 81mg  Tenormin  Hydrodiuril   Zocor  Cialis  Trueplus lancets    Surgical History   Past Surgical History:   Procedure Laterality Date    ARTHROPLASTY KNEE Bilateral     2003/2004    COLONOSCOPY N/A 06/28/2018    Normal. Procedure: COLONOSCOPY;  Colonoscopy;  Surgeon: Benigno Landaverde MD;  Location:  GI    PHACOEMULSIFICATION CLEAR CORNEA WITH STANDARD INTRAOCULAR LENS IMPLANT Right 06/12/2017    Procedure: PHACOEMULSIFICATION CLEAR CORNEA WITH STANDARD INTRAOCULAR LENS IMPLANT;  RIGHT PHACOEMULSIFICATION CLEAR CORNEA WITH STANDARD INTRAOCULAR LENS IMPLANT ;  Surgeon: John Sanchez MD;  Location: [facility]    PHACOEMULSIFICATION CLEAR CORNEA WITH STANDARD INTRAOCULAR LENS IMPLANT Left 06/28/2017    Procedure: PHACOEMULSIFICATION CLEAR CORNEA WITH STANDARD INTRAOCULAR LENS IMPLANT;  LEFT PHACOEMULSIFICATION CLEAR CORNEA WITH STANDARD INTRAOCULAR LENS IMPLANT ;  Surgeon: John Sanchez MD;  Location:  [facility]    PILONIDAL CYST /  "SINUS EXCISION  1966     Physical Exam     Patient Vitals for the past 24 hrs:   BP Temp Temp src Pulse Resp SpO2 Height Weight   11/11/24 0605 109/61 -- -- 73 30 100 % -- --   11/11/24 0546 -- -- -- -- -- 100 % -- --   11/11/24 0545 125/66 -- -- 71 -- -- -- --   11/11/24 0530 111/63 -- -- 66 -- 100 % -- --   11/11/24 0445 83/36 -- -- 69 -- 100 % -- --   11/11/24 0430 97/53 -- -- 70 -- 100 % -- --   11/11/24 0415 98/54 -- -- 68 -- 99 % -- --   11/11/24 0400 89/53 -- -- 72 -- 98 % -- --   11/11/24 0345 89/57 -- -- 77 -- 99 % -- --   11/11/24 0330 83/57 -- -- 80 -- 96 % -- --   11/11/24 0316 99/62 -- -- 87 -- -- -- --   11/11/24 0301 104/72 -- -- 86 -- 100 % -- --   11/11/24 0300 104/72 97.8  F (36.6  C) Oral 87 16 100 % 1.753 m (5' 9\") 87.1 kg (192 lb)   11/11/24 0256 120/75 -- -- 86 -- 99 % -- --     Physical Exam  Nursing note and vitals reviewed.  Constitutional: Cooperative.   HENT:   Mouth/Throat: Mucous membranes are normal.   Cardiovascular: Normal rate, regular rhythm and normal heart sounds.  No murmur.  Pulmonary/Chest: Effort normal and breath sounds normal. No respiratory distress. No wheezes. No rales.   Abdominal: Soft. Normal appearance and bowel sounds are normal. No distension. There is no tenderness. There is no rigidity and no guarding.   Musculoskeletal: Normal range of motion without edema of the lower extremities.   Neurological: Alert.  Oriented x 3.  GCS 15.  Strength normal  Skin: Skin is warm and dry. No rash noted.  Venous stasis changes of the lower extremities.  On the left lateral lower leg there is a wound with evidence of recent bleeding over a varicose vein.  No active hemorrhage after removal of pressure dressings placed by EMS  Psychiatric: Normal mood and affect.     Diagnostics     Lab Results   Labs Ordered and Resulted from Time of ED Arrival to Time of ED Departure   CBC WITH PLATELETS AND DIFFERENTIAL - Abnormal       Result Value    WBC Count 4.2      RBC Count 4.09 (*)     " Hemoglobin 13.1 (*)     Hematocrit 41.0            MCH 32.0      MCHC 32.0      RDW 12.9      Platelet Count 116 (*)     % Neutrophils 63      % Lymphocytes 20      % Monocytes 16      % Eosinophils 2      % Basophils 0      % Immature Granulocytes 0      NRBCs per 100 WBC 0      Absolute Neutrophils 2.7      Absolute Lymphocytes 0.8      Absolute Monocytes 0.7      Absolute Eosinophils 0.1      Absolute Basophils 0.0      Absolute Immature Granulocytes 0.0      Absolute NRBCs 0.0     TROPONIN T, HIGH SENSITIVITY - Abnormal    Troponin T, High Sensitivity 84 (*)    BASIC METABOLIC PANEL - Abnormal    Sodium 142      Potassium 3.5      Chloride 108 (*)     Carbon Dioxide (CO2) 21 (*)     Anion Gap 13      Urea Nitrogen 24.1 (*)     Creatinine 0.83      GFR Estimate 89      Calcium 7.4 (*)     Glucose 134 (*)    HEMOGLOBIN - Abnormal -2-hour delta    Hemoglobin 11.5 (*)    ROUTINE UA WITH MICROSCOPIC - Abnormal    Color Urine Yellow      Appearance Urine Clear      Glucose Urine Negative      Bilirubin Urine Negative      Ketones Urine Negative      Specific Gravity Urine 1.019      Blood Urine Negative      pH Urine 5.5      Protein Albumin Urine 20 (*)     Urobilinogen Urine 2.0      Nitrite Urine Negative      Leukocyte Esterase Urine Negative      Mucus Urine Present (*)     RBC Urine 1      WBC Urine 1      Squamous Epithelials Urine <1      Hyaline Casts Urine 11 (*)    D DIMER QUANTITATIVE - Normal    D-Dimer Quantitative <0.27     PROCALCITONIN - Normal    Procalcitonin 0.04       Imaging   Chest XR,  PA & LAT    (Results Pending)     ECG  ECG taken at 0445, ECG read at 0449  Sinus rhythm with 1st degree AV block  Rate 68 bpm. FL interval 384 ms. QRS duration 82 ms. QT/QTc 412/438 ms. P-R-T axes 45 -18 17.     Independent Interpretation   None    ED Course      Medications Administered   Medications   sodium chloride 0.9% BOLUS 1,000 mL ( Intravenous Canceled Entry 11/11/24 3048)   sodium chloride 0.9%  BOLUS 1,000 mL (1,000 mLs Intravenous $New Bag 11/11/24 0339)     Discussion of Management   0600 I discussed the case with the hospitalist, Dr. Otoole    ED Course   ED Course as of 11/11/24 0449   Mon Nov 11, 2024   0305 I obtained history and examined the patient as noted above.     0428 Patients nurse had the patient stand up. Upon doing so he reported he was lightheaded.   0430 I rechecked the patient.     Additional Documentation  None    Medical Decision Making / Diagnosis     [facility]   Connor Srinivasan is a 79 year old male who presents with a bleeding wound to his left lower leg.  He scraped a scab off of a underlying varicose vein which was difficult to control at home.  Here with direct pressure hemostasis has been achieved.  Blood counts initially showed a hemoglobin of 13.  There was a described significant blood loss at home.  Here he was somewhat hypotensive for a prolonged period of time.  He denies any infectious symptoms chest pain shortness of breath etc. as above.  Even with the first bag of IV fluid he remained hypotensive.  Attempts ambulation resulted in orthostatic dizziness.  Second bag of fluid seems to have helped his pressure more however his blood work which was added on reveals a markedly elevated troponin.  EKG shows a first-degree AV block but nonischemic otherwise.  His dimer is negative speaking against pulmonary embolism or thrombotic disease.  It is possible that he was hypotensive for a more prolonged period at home related to the blood loss plus minus a vasovagal response.  This could have resulted in type II myocardial infarction.  I think he would benefit from admission to observation status on the monitor for serial hemoglobins and echocardiogram.    Disposition   The patient was admitted to the hospital.     Diagnosis     ICD-10-CM    1. Bleeding from varicose veins of lower extremity, left  I83.892       2. Anemia due to blood loss, acute  D62       3. Transient  hypotension  I95.9       4. Elevated troponin  R79.89          Scribe Disclosure:  I, EMILY CHAMPION, am serving as a scribe at 3:07 AM on 11/11/2024 to document services personally performed by John Steele MD based on my observations and the provider's statements to me.        John Steele MD  11/11/24 0620

## 2024-11-11 NOTE — ED NOTES
[facility]  ED Nurse Handoff Report    ED Chief complaint: Bleeding/Bruising  . ED Diagnosis:   Final diagnoses:   Bleeding from varicose veins of lower extremity, left   Anemia due to blood loss, acute   Transient hypotension   Elevated troponin       Allergies:   Allergies   Allergen Reactions    No Known Drug Allergy        Code Status: Full Code    Activity level - Baseline/Home:  independent.  Activity Level - Current:   standby.   Lift room needed: No.   Bariatric: No   Needed: No   Isolation: No.   Infection: Not Applicable.     Respiratory status: Room air    Vital Signs (within 30 minutes):   Vitals:    11/11/24 0530 11/11/24 0545 11/11/24 0546 11/11/24 0605   BP: 111/63 125/66  109/61   Pulse: 66 71  73   Resp:    30   Temp:       TempSrc:       SpO2: 100%  100% 100%   Weight:       Height:           Cardiac Rhythm:  ,      Pain level:    Patient confused: No.   Patient Falls Risk: bed/chair alarm on, nonskid shoes/slippers when out of bed, and patient and family education.   Elimination Status: Has voided     Patient Report - Initial Complaint: bleeding .   Focused Assessment:     RespiratoryAirway WDL: WDL  Respiratory WDLRespiratory WDL: WDL SA          0410  Cardiac  Cardiac (Adult)Cardiac WDL: .WDL exceptCardiac Rhythm: NSR SA       0409  Skin  SkinSkin WDL: .WDL exceptSkin Comments: patient has a small scab LLE, pt wound coveref by EMS dressing upon arrival. note bleeding while on unit.         Abnormal Results:   Labs Ordered and Resulted from Time of ED Arrival to Time of ED Departure   CBC WITH PLATELETS AND DIFFERENTIAL - Abnormal       Result Value    WBC Count 4.2      RBC Count 4.09 (*)     Hemoglobin 13.1 (*)     Hematocrit 41.0            MCH 32.0      MCHC 32.0      RDW 12.9      Platelet Count 116 (*)     % Neutrophils 63      % Lymphocytes 20      % Monocytes 16      % Eosinophils 2      % Basophils 0      % Immature Granulocytes 0      NRBCs per  100 WBC 0      Absolute Neutrophils 2.7      Absolute Lymphocytes 0.8      Absolute Monocytes 0.7      Absolute Eosinophils 0.1      Absolute Basophils 0.0      Absolute Immature Granulocytes 0.0      Absolute NRBCs 0.0     TROPONIN T, HIGH SENSITIVITY - Abnormal    Troponin T, High Sensitivity 84 (*)    BASIC METABOLIC PANEL - Abnormal    Sodium 142      Potassium 3.5      Chloride 108 (*)     Carbon Dioxide (CO2) 21 (*)     Anion Gap 13      Urea Nitrogen 24.1 (*)     Creatinine 0.83      GFR Estimate 89      Calcium 7.4 (*)     Glucose 134 (*)    HEMOGLOBIN - Abnormal    Hemoglobin 11.5 (*)    ROUTINE UA WITH MICROSCOPIC - Abnormal    Color Urine Yellow      Appearance Urine Clear      Glucose Urine Negative      Bilirubin Urine Negative      Ketones Urine Negative      Specific Gravity Urine 1.019      Blood Urine Negative      pH Urine 5.5      Protein Albumin Urine 20 (*)     Urobilinogen Urine 2.0      Nitrite Urine Negative      Leukocyte Esterase Urine Negative      Mucus Urine Present (*)     RBC Urine 1      WBC Urine 1      Squamous Epithelials Urine <1      Hyaline Casts Urine 11 (*)    D DIMER QUANTITATIVE - Normal    D-Dimer Quantitative <0.27     PROCALCITONIN - Normal    Procalcitonin 0.04          Chest XR,  PA & LAT    (Results Pending)       Treatments provided: see MAR  Family Comments: wife at bedside  OBS brochure/video discussed/provided to patient:  Yes  ED Medications:   Medications   sodium chloride 0.9% BOLUS 1,000 mL (0 mLs Intravenous Stopped 11/11/24 0605)   sodium chloride 0.9% BOLUS 1,000 mL (0 mLs Intravenous Stopped 11/11/24 0605)       Drips infusing:  Yes  For the majority of the shift this patient was Green.   Interventions performed were none needed.    Sepsis treatment initiated: No    Cares/treatment/interventions/medications to be completed following ED care: continue plan of care    ED Nurse Name: Neelam Jones RN  6:06 AM   RECEIVING UNIT ED HANDOFF REVIEW    Above ED  Nurse Handoff Report was reviewed: Yes  Reviewed by: Wendie Altamirano RN on November 11, 2024 at 10:28 AM   I Donta called the ED to inform them the note was read: Yes

## 2024-11-11 NOTE — H&P
History and Physical     Connor Srinivasan MRN# [id number]   YOB: 1945 Age: 79 year old      Date of Admission:  11/11/2024    Primary care provider: Roverto Edwards          Assessment and Plan:     Summary of Stay: Connor Srinivasan is a 79 year old male with a history of  htn/hlp, thrombocytopenia 100-120's varicose veins admitted on 11/11/2024 with varicose vein bleed    He picked a scab and as soon as he did he started having blood spurt out from his distal left leg.  He ran into the bathroom and hung his distal leg over the toilet grabbed some TP which immediately got soaked so started grabbing some towels and apparently soaked through a bunch of them.  He's only on asa 81 mg per day.  EMS was called and they estimated ~ 200 ml blood loss.  Bleeding was controlled prior to arriving in the ER.     In the ER VS initially ok and was given 1 L of NS. Labs notable for acute anemia but otherwise were ok.  Plan was initially to discharge him home but then he dropped his BPs into the 80's/50's and so received another 1 L bolus with good response and an expanded work up which was notable for worsening of his anemia 13.1->11.5 over 90 minute period and troponin was elevated at 84.  EKG was NSR with an impressive 1*  (!)    He denies any cp, sob, he reports that he is feeling just fine.  Although did note that he got a little dizzy when sitting up but then that seemed to improve with standing.     ER current BPs 109/61, HR 70's sats 100% RR upper teens (he is not breathing at 30 as recently documented) and afebrile   BMP bicarb slightly low 21, bun/creat 24/0.83 consistent with prerenal azotemia   CBC hgb 13.1->11.58, platelets are noted to be low at 116 (baseline)   D-dimer negative  UA negative x some hyaline casts     CXR is clear     I am asked to admit for monitoring in light of ABL and elevated troponin     Problem List:   Elevated troponin   Moderately elevated but asx.  ? Due to  "hypotension/blood loss.  Absolutely no sx.  I suspect this may represent some \"unmasking\" of CAD.  He has no hx of it and is compliant with his htn and hlp medications.  -serial trops, echo, tele       *resumed pta asa given rising troponin 84->132.  I do not feel heparin is warranted at least yet   -he states he can walk on a treadmill which would be reasonable to do at some point. If trops are flat and echo is normal consider OP stress echo. Another option would be to pursue lexiscan during this hospitalization.   If rate of rise is  significant or echo abnormal would get cards consultation.     ABL 2/2 variceal bleed   Apparently this has happened to him in the past. Of note baseline hgb is in the 14-15 range.  On presentation 13.1 -> 11.5 within about 90 minutes.   -marylou at noon with ABO   -blood consent signed and in chart     Htn, hlp  Hold BP meds for now (he reports that he is on atenolol and hydrochlorothiazide)  Would resume simva when med rec complete      Chronic thrombocytopenia   At baseline, complicates above         DVT Prophylaxis: Low Risk/Ambulatory with no VTE prophylaxis indicated  Code Status: Full Code  Functional Status: independent   Navarro: not needed  Access:   PIV              Time spent 65 minutes reviewing epic including notes/labs/prior hx, current medications.  In addition to interviewing and examining the patient, updated patient and family regarding plan of care          Chief Complaint:     Bleeding leg from a varicose vein        History of Present Illness:   Connor Srinivasan is a 79 year old male with a history of  htn/hlp, thrombocytopenia 100-120's varicose veins admitted on 11/11/2024 with varicose vein bleed    He picked a scab and as soon as he did he started having blood spurt out from his distal left leg.  He ran into the bathroom and hung his distal leg over the toilet grabbed some TP which immediately got soaked so started grabbing some towels and apparently soaked " through a bunch of them.  He's only on asa 81 mg per day.  EMS was called and they estimated ~ 200 ml blood loss.  Bleeding was controlled prior to arriving in the ER.     In the ER VS initially ok and was given 1 L of NS. Labs notable for acute anemia but otherwise were ok.  Plan was initially to discharge him home but then he dropped his BPs into the 80's/50's and so received another 1 L bolus with good response and an expanded work up which was notable for worsening of his anemia 13.1->11.5 over 90 minute period and troponin was elevated at 84.  EKG was NSR with an impressive 1*  (!)    He denies any cp, sob, he reports that he is feeling just fine.  Although did note that he got a little dizzy when sitting up but then that seemed to improve with standing.     ER current BPs 109/61, HR 70's sats 100% RR upper teens (he is not breathing at 30 as recently documented) and afebrile   BMP bicarb slightly low 21, bun/creat 24/0.83 consistent with prerenal azotemia   CBC hgb 13.1->11.58, platelets are noted to be low at 116 (baseline)   D-dimer negative  UA negative x some hyaline casts     CXR is clear     I am asked to admit for monitoring in light of ABL and elevated troponin       The history is obtained in discussion with the ER provider John Steele MD and the patient with excellent reliability       Epic and Care everywhere were extensively reviewed        Past Medical History:     Past Medical History:   Diagnosis Date    Chronic rhinitis     First degree AV block     Hyperlipidemia     Hypertension     Osteoarthritis     Type 2 diabetes mellitus              Past Surgical History:     Past Surgical History:   Procedure Laterality Date    ARTHROPLASTY KNEE Bilateral     2003/2004    COLONOSCOPY N/A 06/28/2018    Normal. Procedure: COLONOSCOPY;  Colonoscopy;  Surgeon: Benigno Landaverde MD;  Location:  GI    PHACOEMULSIFICATION CLEAR CORNEA WITH STANDARD INTRAOCULAR LENS IMPLANT Right 06/12/2017     "Procedure: PHACOEMULSIFICATION CLEAR CORNEA WITH STANDARD INTRAOCULAR LENS IMPLANT;  RIGHT PHACOEMULSIFICATION CLEAR CORNEA WITH STANDARD INTRAOCULAR LENS IMPLANT ;  Surgeon: John Sanchez MD;  Location:  [facility]    PHACOEMULSIFICATION CLEAR CORNEA WITH STANDARD INTRAOCULAR LENS IMPLANT Left 2017    Procedure: PHACOEMULSIFICATION CLEAR CORNEA WITH STANDARD INTRAOCULAR LENS IMPLANT;  LEFT PHACOEMULSIFICATION CLEAR CORNEA WITH STANDARD INTRAOCULAR LENS IMPLANT ;  Surgeon: John Sanchez MD;  Location:  [facility]    PILONIDAL CYST / SINUS EXCISION               Social History:     Social History     Tobacco Use    Smoking status: Never    Smokeless tobacco: Never   Substance Use Topics    Alcohol use: Yes     Comment: Very light use             Family History:     Family History   Problem Relation Age of Onset    Heart Disease Mother          age 90, had two valves replaced, on coumadin. Hospitalized for GI bleeding in .    C.A.D. Father          in his 80's around .    Arthritis Sister         Born 1946, bilateral knee replacement for arthritis    Colon Cancer No family hx of             Allergies:     Allergies   Allergen Reactions    No Known Drug Allergy              Medications:     Await med rec           Review of Systems:     A Comprehensive greater than 10 system review of systems was carried out.  Pertinent positives and negatives are noted above.  Otherwise negative for contributory information.           Physical Exam:   Blood pressure 109/61, pulse 73, temperature 97.8  F (36.6  C), temperature source Oral, resp. rate 30, height 1.753 m (5' 9\"), weight 87.1 kg (192 lb), SpO2 100%.  Exam:    General:  Pleasant nad looks stated age  HEENT:  Head nc/at sclera clear PER Neck is supple  Lungs: cta b nl effort   CV:  RRR no m/r/g no le edema  Abd:  S/nt/nd no r/g  Neuro:  Cn 2-12 grossly intact and heard   Alert and oriented affect appropriate   Skin:  W/d no c/c               " Data:     Results for orders placed or performed during the hospital encounter of 11/11/24   Chest XR,  PA & LAT     Status: None    Narrative    EXAM: XR CHEST 2 VIEWS  LOCATION: [facility]  DATE: 11/11/2024    INDICATION: elevated Trop, weakness  COMPARISON: Two-view chest radiograph 03/30/2023      Impression    IMPRESSION: Normal heart size without pulmonary vascular congestion. No focal pulmonary consolidation or pleural effusion. No pneumothorax. Atherosclerotic calcifications of the aortic arch. Degenerative changes of the thoracic spine with flowing   anterior osteophytes. No acute osseous abnormality.   CBC with platelets and differential     Status: Abnormal   Result Value Ref Range    WBC Count 4.2 4.0 - 11.0 10e3/uL    RBC Count 4.09 (L) 4.40 - 5.90 10e6/uL    Hemoglobin 13.1 (L) 13.3 - 17.7 g/dL    Hematocrit 41.0 40.0 - 53.0 %     78 - 100 fL    MCH 32.0 26.5 - 33.0 pg    MCHC 32.0 31.5 - 36.5 g/dL    RDW 12.9 10.0 - 15.0 %    Platelet Count 116 (L) 150 - 450 10e3/uL    % Neutrophils 63 %    % Lymphocytes 20 %    % Monocytes 16 %    % Eosinophils 2 %    % Basophils 0 %    % Immature Granulocytes 0 %    NRBCs per 100 WBC 0 <1 /100    Absolute Neutrophils 2.7 1.6 - 8.3 10e3/uL    Absolute Lymphocytes 0.8 0.8 - 5.3 10e3/uL    Absolute Monocytes 0.7 0.0 - 1.3 10e3/uL    Absolute Eosinophils 0.1 0.0 - 0.7 10e3/uL    Absolute Basophils 0.0 0.0 - 0.2 10e3/uL    Absolute Immature Granulocytes 0.0 <=0.4 10e3/uL    Absolute NRBCs 0.0 10e3/uL   Troponin T, High Sensitivity     Status: Abnormal   Result Value Ref Range    Troponin T, High Sensitivity 84 (H) <=22 ng/L   Basic metabolic panel (BMP)     Status: Abnormal   Result Value Ref Range    Sodium 142 135 - 145 mmol/L    Potassium 3.5 3.4 - 5.3 mmol/L    Chloride 108 (H) 98 - 107 mmol/L    Carbon Dioxide (CO2) 21 (L) 22 - 29 mmol/L    Anion Gap 13 7 - 15 mmol/L    Urea Nitrogen 24.1 (H) 8.0 - 23.0 mg/dL    Creatinine 0.83 0.67 -  1.17 mg/dL    GFR Estimate 89 >60 mL/min/1.73m2    Calcium 7.4 (L) 8.8 - 10.4 mg/dL    Glucose 134 (H) 70 - 99 mg/dL   Hemoglobin     Status: Abnormal   Result Value Ref Range    Hemoglobin 11.5 (L) 13.3 - 17.7 g/dL   UA with Microscopic     Status: Abnormal   Result Value Ref Range    Color Urine Yellow Colorless, Straw, Light Yellow, Yellow    Appearance Urine Clear Clear    Glucose Urine Negative Negative mg/dL    Bilirubin Urine Negative Negative    Ketones Urine Negative Negative mg/dL    Specific Gravity Urine 1.019 1.003 - 1.035    Blood Urine Negative Negative    pH Urine 5.5 5.0 - 7.0    Protein Albumin Urine 20 (A) Negative mg/dL    Urobilinogen Urine 2.0 Normal, 2.0 mg/dL    Nitrite Urine Negative Negative    Leukocyte Esterase Urine Negative Negative    Mucus Urine Present (A) None Seen /LPF    RBC Urine 1 <=2 /HPF    WBC Urine 1 <=5 /HPF    Squamous Epithelials Urine <1 <=1 /HPF    Hyaline Casts Urine 11 (H) <=2 /LPF   Extra Tube ([facility] Draw)     Status: None    Narrative    The following orders were created for panel order Extra Tube ([facility] Draw).  Procedure                               Abnormality         Status                     ---------                               -----------         ------                     Extra Blue Top Tube[[id number]]                              Final result                 Please view results for these tests on the individual orders.   Extra Blue Top Tube     Status: None   Result Value Ref Range    Hold Specimen [facility]    D dimer quantitative     Status: Normal   Result Value Ref Range    D-Dimer Quantitative <0.27 0.00 - 0.50 ug/mL FEU    Narrative    This D-dimer assay is intended for use in conjunction with a clinical pretest probability assessment model to exclude pulmonary embolism (PE) and deep venous thrombosis (DVT) in outpatients suspected of PE or DVT. The cut-off value is 0.50 ug/mL FEU.    For patients 50 years of age or older, the application of  age-adjusted cut-off values for D-Dimer may increase the specificity without significant effect on sensitivity. The literature suggested calculation age adjusted cut-off in ug/L = age in years x 10 ug/L. The results in this laboratory are reported as ug/mL rather than ug/L. The calculation for age adjusted cut off in ug/mL= age in years x 0.01 ug/mL. For example, the cut off for a 76 year old male is 76 x 0.01 ug/mL = 0.76 ug/mL (760 ug/L).    M Juan et al. Age adjusted D-dimer cut-off levels to rule out pulmonary embolism: The ADJUST-PE Study. AVLARADO 2014;311:5782-8987.; HJ Jennifer et al. Diagnostic accuracy of conventional or age adjusted D-dimer cutoff values in older patients with suspected venous thromboembolism. Systemic review and meta-analysis. BMJ 2013:346:f2492.   Procalcitonin     Status: Normal   Result Value Ref Range    Procalcitonin 0.04 <0.50 ng/mL   Troponin T, High Sensitivity     Status: Abnormal   Result Value Ref Range    Troponin T, High Sensitivity 132 (HH) <=22 ng/L   EKG 12 lead     Status: None   Result Value Ref Range    Systolic Blood Pressure  mmHg    Diastolic Blood Pressure  mmHg    Ventricular Rate 68 BPM    Atrial Rate 68 BPM    VA Interval 384 ms    QRS Duration 82 ms     ms    QTc 438 ms    P Axis 45 degrees    R AXIS -18 degrees    T Axis 17 degrees    Interpretation ECG       Sinus rhythm with 1st degree A-V block  Otherwise normal ECG  When compared with ECG of 30-Mar-2012 11:38,  No significant change was found  Confirmed by - EMERGENCY ROOM, PHYSICIAN ([id number]),  SUDHIR HAM (Cristin) on 11/11/2024 7:04:45 AM     CBC + differential     Status: Abnormal    Narrative    The following orders were created for panel order CBC + differential.  Procedure                               Abnormality         Status                     ---------                               -----------         ------                     CBC with platelets and d...[[id number]]  Abnormal             Final result                 Please view results for these tests on the individual orders.

## 2024-11-11 NOTE — PLAN OF CARE
"Goal Outcome Evaluation:      Plan of Care Reviewed With: patient    Overall Patient Progress: improvingOverall Patient Progress: improving    Outcome Evaluation: A/O up ad anu denies pain      Problem: Adult Inpatient Plan of Care  Goal: Plan of Care Review  Description: The Plan of Care Review/Shift note should be completed every shift.  The Outcome Evaluation is a brief statement about your assessment that the patient is improving, declining, or no change.  This information will be displayed automatically on your shift  note.  11/11/2024 1546 by Wendie Altamirano RN  Outcome: Progressing  Flowsheets (Taken 11/11/2024 1546)  Outcome Evaluation: A/O up ad anu denies pain  Plan of Care Reviewed With: patient  Overall Patient Progress: improving  11/11/2024 1151 by Wendie Altamirano RN  Outcome: Progressing  Flowsheets (Taken 11/11/2024 1151)  Outcome Evaluation: A/O x 4, up ad anu, no pain or dizziness, tolerating diet, coban to left lower extremity  Overall Patient Progress: improving  Goal: Patient-Specific Goal (Individualized)  Description: You can add care plan individualizations to a care plan. Examples of Individualization might be:  \"Parent requests to be called daily at 9am for status\", \"I have a hard time hearing out of my right ear\", or \"Do not touch me to wake me up as it startles  me\".  11/11/2024 1546 by Wendie Altamirano RN  Outcome: Progressing  11/11/2024 1151 by Wendie Altamirano RN  Outcome: Progressing  Goal: Absence of Hospital-Acquired Illness or Injury  11/11/2024 1546 by Wendie Altamirano RN  Outcome: Progressing  11/11/2024 1151 by Wendie Altamirano RN  Outcome: Progressing  Intervention: Prevent Skin Injury  Recent Flowsheet Documentation  Taken 11/11/2024 1046 by Wendie Altamirano RN  Body Position: supine, head elevated  Goal: Optimal Comfort and Wellbeing  11/11/2024 1546 by Wendie Altamirano RN  Outcome: Progressing  11/11/2024 1151 by Wendie Altamirano, " RN  Outcome: Progressing  Goal: Readiness for Transition of Care  11/11/2024 1546 by Wendie Altamirano RN  Outcome: Progressing  11/11/2024 1151 by Wendie Altamirano RN  Outcome: Progressing  Intervention: Mutually Develop Transition Plan  Recent Flowsheet Documentation  Taken 11/11/2024 1200 by Wendie Altamirano RN  Equipment Currently Used at Home: none  Taken 11/11/2024 1100 by Wendie Altamirano RN  Equipment Currently Used at Home: none   PRIMARY DIAGNOSIS: thrombocytopenia  OUTPATIENT/OBSERVATION GOALS TO BE MET BEFORE DISCHARGE:  ADLs back to baseline: Yes    Activity and level of assistance: Ambulating independently.    Pain status: Pain free.    Return to near baseline physical activity: Yes     Discharge Planner Nurse   Safe discharge environment identified: Yes  Barriers to discharge: Yes       Entered by: Wendie Altamirano RN 11/11/2024 3:47 PM     Please review provider order for any additional goals.   Nurse to notify provider when observation goals have been met and patient is ready for discharge.

## 2024-11-11 NOTE — PLAN OF CARE
PRIMARY DIAGNOSIS: thrombocytopenia  OUTPATIENT/OBSERVATION GOALS TO BE MET BEFORE DISCHARGE:  ADLs back to baseline: Yes    Activity and level of assistance: Ambulating independently.    Pain status: Pain free.    Return to near baseline physical activity: Yes     Discharge Planner Nurse   Safe discharge environment identified: Yes  Barriers to discharge: Yes       Entered by: Wendie Altamirano RN 11/11/2024 11:52 AM     Please review provider order for any additional goals.   Nurse to notify provider when observation goals have been met and patient is ready for discharge.Goal Outcome Evaluation:           Overall Patient Progress: improvingOverall Patient Progress: improving    Outcome Evaluation: A/O x 4, up ad anu, no pain or dizziness, tolerating diet, coban to left lower extremity

## 2024-11-11 NOTE — PLAN OF CARE
ROOM # 204-1    Living Situation (if not independent, order SW consult):  Facility name:  : [facility]    Activity level at baseline: ind  Activity level on admit: ind      Patient registered to observation; given Patient Bill of Rights; given the opportunity to ask questions about observation status and their plan of care.  Patient has been oriented to the observation room, bathroom and call light is in place.    Discussed discharge goals and expectations with patient/family.

## 2024-11-11 NOTE — DISCHARGE INSTRUCTIONS
You were seen today for a bleeding varicose vein in your left leg.  These can be difficult to control sometimes.  Direct pressure is the best first-line treatment for any type of hemorrhage.  We did place a gauze dressing that has a clotting agent in it which you should leave in place for the next 48 hours to assist with bleeding control.

## 2024-11-11 NOTE — PHARMACY-ADMISSION MEDICATION HISTORY
Pharmacist Admission Medication History    Admission medication history is complete. The information provided in this note is only as accurate as the sources available at the time of the update.    Information Source(s): Patient and CareEverywhere/SureScripts via in-person    Pertinent Information: none    Changes made to PTA medication list:  Added: cetirizine  Deleted: None  Changed: None    Medication History Completed By:   Caitlyn Bhatt, PharmD, [facility]   Emergency Medicine Pharmacist  [phone] or [place]  November 11, 2024    PTA Med List   Medication Sig Last Dose/Taking    ASPIRIN 81 MG OR TABS ONE DAILY 11/10/2024 Morning    atenolol (TENORMIN) 50 MG tablet Take 1 tablet (50 mg) by mouth daily 11/10/2024 Morning    cetirizine (ZYRTEC) 10 MG tablet Take 10 mg by mouth daily. 11/10/2024 Morning    hydrochlorothiazide (HYDRODIURIL) 25 MG tablet Take 1 tablet (25 mg) by mouth daily 11/10/2024 Morning    simvastatin (ZOCOR) 40 MG tablet Take 1 tablet (40 mg) by mouth at bedtime 11/9/2024 Evening    tadalafil (CIALIS) 20 MG tablet TAKE ONE TABLET BY MOUTH ONE TIME DAILY. TAKE AT LEAST 30 MINUTES BEFORE INTERCOURSE More than a month

## 2024-11-12 ENCOUNTER — ORDERS ONLY (AUTO-RELEASED) (OUTPATIENT)
Dept: EMERGENCY MEDICINE | Facility: CLINIC | Age: 79
End: 2024-11-12

## 2024-11-12 ENCOUNTER — HOSPITAL ENCOUNTER (EMERGENCY)
Facility: CLINIC | Age: 79
Discharge: NURSING, REHAB OR LONG-TERM CARE | End: 2024-11-12
Payer: COMMERCIAL

## 2024-11-12 VITALS
HEART RATE: 65 BPM | RESPIRATION RATE: 16 BRPM | HEIGHT: 69 IN | TEMPERATURE: 97.7 F | OXYGEN SATURATION: 99 % | BODY MASS INDEX: 29.02 KG/M2 | DIASTOLIC BLOOD PRESSURE: 65 MMHG | SYSTOLIC BLOOD PRESSURE: 117 MMHG | WEIGHT: 195.9 LBS

## 2024-11-12 VITALS
OXYGEN SATURATION: 99 % | RESPIRATION RATE: 16 BRPM | SYSTOLIC BLOOD PRESSURE: 129 MMHG | TEMPERATURE: 97.5 F | HEART RATE: 81 BPM | DIASTOLIC BLOOD PRESSURE: 72 MMHG

## 2024-11-12 DIAGNOSIS — I44.0 FIRST DEGREE ATRIOVENTRICULAR BLOCK: ICD-10-CM

## 2024-11-12 DIAGNOSIS — R53.83 FATIGUE: ICD-10-CM

## 2024-11-12 DIAGNOSIS — I83.892 BLEEDING FROM VARICOSE VEINS OF LEFT LOWER EXTREMITY: ICD-10-CM

## 2024-11-12 DIAGNOSIS — I83.892 BLEEDING FROM VARICOSE VEINS OF LOWER EXTREMITY, LEFT: ICD-10-CM

## 2024-11-12 LAB
ANION GAP SERPL CALCULATED.3IONS-SCNC: 10 MMOL/L (ref 7–15)
ANION GAP SERPL CALCULATED.3IONS-SCNC: 9 MMOL/L (ref 7–15)
ATRIAL RATE - MUSE: 59 BPM
BASOPHILS # BLD AUTO: 0 10E3/UL (ref 0–0.2)
BASOPHILS NFR BLD AUTO: 0 %
BUN SERPL-MCNC: 16.3 MG/DL (ref 8–23)
BUN SERPL-MCNC: 21.3 MG/DL (ref 8–23)
CALCIUM SERPL-MCNC: 8.3 MG/DL (ref 8.8–10.4)
CALCIUM SERPL-MCNC: 8.3 MG/DL (ref 8.8–10.4)
CHLORIDE SERPL-SCNC: 109 MMOL/L (ref 98–107)
CHLORIDE SERPL-SCNC: 109 MMOL/L (ref 98–107)
CREAT SERPL-MCNC: 0.64 MG/DL (ref 0.67–1.17)
CREAT SERPL-MCNC: 0.68 MG/DL (ref 0.67–1.17)
DIASTOLIC BLOOD PRESSURE - MUSE: NORMAL MMHG
EGFRCR SERPLBLD CKD-EPI 2021: >90 ML/MIN/1.73M2
EGFRCR SERPLBLD CKD-EPI 2021: >90 ML/MIN/1.73M2
EOSINOPHIL # BLD AUTO: 0 10E3/UL (ref 0–0.7)
EOSINOPHIL NFR BLD AUTO: 1 %
ERYTHROCYTE [DISTWIDTH] IN BLOOD BY AUTOMATED COUNT: 13 % (ref 10–15)
ERYTHROCYTE [DISTWIDTH] IN BLOOD BY AUTOMATED COUNT: 13.1 % (ref 10–15)
GLUCOSE SERPL-MCNC: 110 MG/DL (ref 70–99)
GLUCOSE SERPL-MCNC: 89 MG/DL (ref 70–99)
HCO3 SERPL-SCNC: 21 MMOL/L (ref 22–29)
HCO3 SERPL-SCNC: 23 MMOL/L (ref 22–29)
HCT VFR BLD AUTO: 32.9 % (ref 40–53)
HCT VFR BLD AUTO: 35.2 % (ref 40–53)
HGB BLD-MCNC: 10.8 G/DL (ref 13.3–17.7)
HGB BLD-MCNC: 11.6 G/DL (ref 13.3–17.7)
HOLD SPECIMEN: NORMAL
HOLD SPECIMEN: NORMAL
IMM GRANULOCYTES # BLD: 0 10E3/UL
IMM GRANULOCYTES NFR BLD: 0 %
INTERPRETATION ECG - MUSE: NORMAL
LYMPHOCYTES # BLD AUTO: 0.4 10E3/UL (ref 0.8–5.3)
LYMPHOCYTES NFR BLD AUTO: 8 %
MAGNESIUM SERPL-MCNC: 1.6 MG/DL (ref 1.7–2.3)
MCH RBC QN AUTO: 32.1 PG (ref 26.5–33)
MCH RBC QN AUTO: 32.4 PG (ref 26.5–33)
MCHC RBC AUTO-ENTMCNC: 32.8 G/DL (ref 31.5–36.5)
MCHC RBC AUTO-ENTMCNC: 33 G/DL (ref 31.5–36.5)
MCV RBC AUTO: 98 FL (ref 78–100)
MCV RBC AUTO: 99 FL (ref 78–100)
MONOCYTES # BLD AUTO: 0.5 10E3/UL (ref 0–1.3)
MONOCYTES NFR BLD AUTO: 11 %
NEUTROPHILS # BLD AUTO: 3.7 10E3/UL (ref 1.6–8.3)
NEUTROPHILS NFR BLD AUTO: 80 %
NRBC # BLD AUTO: 0 10E3/UL
NRBC BLD AUTO-RTO: 0 /100
P AXIS - MUSE: 58 DEGREES
PLATELET # BLD AUTO: 100 10E3/UL (ref 150–450)
PLATELET # BLD AUTO: 116 10E3/UL (ref 150–450)
POTASSIUM SERPL-SCNC: 3.4 MMOL/L (ref 3.4–5.3)
POTASSIUM SERPL-SCNC: 4 MMOL/L (ref 3.4–5.3)
PR INTERVAL - MUSE: 416 MS
QRS DURATION - MUSE: 86 MS
QT - MUSE: 416 MS
QTC - MUSE: 411 MS
R AXIS - MUSE: -16 DEGREES
RBC # BLD AUTO: 3.33 10E6/UL (ref 4.4–5.9)
RBC # BLD AUTO: 3.61 10E6/UL (ref 4.4–5.9)
SODIUM SERPL-SCNC: 140 MMOL/L (ref 135–145)
SODIUM SERPL-SCNC: 141 MMOL/L (ref 135–145)
SYSTOLIC BLOOD PRESSURE - MUSE: NORMAL MMHG
T AXIS - MUSE: 42 DEGREES
TROPONIN T SERPL HS-MCNC: 64 NG/L
VENTRICULAR RATE- MUSE: 59 BPM
WBC # BLD AUTO: 3.1 10E3/UL (ref 4–11)
WBC # BLD AUTO: 4.6 10E3/UL (ref 4–11)

## 2024-11-12 PROCEDURE — 82310 ASSAY OF CALCIUM: CPT

## 2024-11-12 PROCEDURE — 80048 BASIC METABOLIC PNL TOTAL CA: CPT | Performed by: INTERNAL MEDICINE

## 2024-11-12 PROCEDURE — 85025 COMPLETE CBC W/AUTO DIFF WBC: CPT | Performed by: INTERNAL MEDICINE

## 2024-11-12 PROCEDURE — 85027 COMPLETE CBC AUTOMATED: CPT

## 2024-11-12 PROCEDURE — 250N000013 HC RX MED GY IP 250 OP 250 PS 637: Performed by: INTERNAL MEDICINE

## 2024-11-12 PROCEDURE — 82947 ASSAY GLUCOSE BLOOD QUANT: CPT | Performed by: INTERNAL MEDICINE

## 2024-11-12 PROCEDURE — G0378 HOSPITAL OBSERVATION PER HR: HCPCS

## 2024-11-12 PROCEDURE — 36415 COLL VENOUS BLD VENIPUNCTURE: CPT | Performed by: INTERNAL MEDICINE

## 2024-11-12 PROCEDURE — 80048 BASIC METABOLIC PNL TOTAL CA: CPT

## 2024-11-12 PROCEDURE — 93005 ELECTROCARDIOGRAM TRACING: CPT

## 2024-11-12 PROCEDURE — 84484 ASSAY OF TROPONIN QUANT: CPT

## 2024-11-12 PROCEDURE — 82374 ASSAY BLOOD CARBON DIOXIDE: CPT

## 2024-11-12 PROCEDURE — 83735 ASSAY OF MAGNESIUM: CPT | Performed by: SOCIAL WORKER

## 2024-11-12 PROCEDURE — 250N000013 HC RX MED GY IP 250 OP 250 PS 637

## 2024-11-12 PROCEDURE — 99284 EMERGENCY DEPT VISIT MOD MDM: CPT

## 2024-11-12 PROCEDURE — 36415 COLL VENOUS BLD VENIPUNCTURE: CPT

## 2024-11-12 RX ORDER — MAGNESIUM OXIDE 400 MG/1
400 TABLET ORAL ONCE
Status: COMPLETED | OUTPATIENT
Start: 2024-11-12 | End: 2024-11-12

## 2024-11-12 RX ADMIN — Medication 400 MG: at 17:28

## 2024-11-12 RX ADMIN — ATENOLOL 50 MG: 25 TABLET ORAL at 08:42

## 2024-11-12 RX ADMIN — HYDROCHLOROTHIAZIDE 25 MG: 25 TABLET ORAL at 08:42

## 2024-11-12 RX ADMIN — ASPIRIN 81 MG: 81 TABLET, COATED ORAL at 08:42

## 2024-11-12 ASSESSMENT — ACTIVITIES OF DAILY LIVING (ADL)
ADLS_ACUITY_SCORE: 0

## 2024-11-12 ASSESSMENT — COLUMBIA-SUICIDE SEVERITY RATING SCALE - C-SSRS
6. HAVE YOU EVER DONE ANYTHING, STARTED TO DO ANYTHING, OR PREPARED TO DO ANYTHING TO END YOUR LIFE?: NO
2. HAVE YOU ACTUALLY HAD ANY THOUGHTS OF KILLING YOURSELF IN THE PAST MONTH?: NO
1. IN THE PAST MONTH, HAVE YOU WISHED YOU WERE DEAD OR WISHED YOU COULD GO TO SLEEP AND NOT WAKE UP?: NO

## 2024-11-12 NOTE — PLAN OF CARE
"PRIMARY DIAGNOSIS: VARICOSE VEIN BLEED  OUTPATIENT/OBSERVATION GOALS TO BE MET BEFORE DISCHARGE:  ADLs back to baseline: Yes    Activity and level of assistance: Ambulating independently.    Pain status: Pain free.    Return to near baseline physical activity: Yes     Discharge Planner Nurse   Safe discharge environment identified: Yes  Barriers to discharge: Yes       Entered by: Leticia Dowd RN 11/11/2024 10:15 PM   Patient alert and oriented. Vitally stable on room air. Ambulated halls x1 this evening- post walk patient started bleeding from left shin- Pressure applied and bandage placed to stop the bleeding. Cross-cover notified. Patient denies light-headedness/dizziness. Denies pain. Independent in room. Cardiac monitor in place. Calls appropriately.   Please review provider order for any additional goals.   Nurse to notify provider when observation goals have been met and patient is ready for discharge.      Goal Outcome Evaluation:      Plan of Care Reviewed With: patient    Overall Patient Progress: improvingOverall Patient Progress: improving       Problem: Adult Inpatient Plan of Care  Goal: Plan of Care Review  Description: The Plan of Care Review/Shift note should be completed every shift.  The Outcome Evaluation is a brief statement about your assessment that the patient is improving, declining, or no change.  This information will be displayed automatically on your shift  note.  Outcome: Progressing  Flowsheets (Taken 11/11/2024 2215)  Plan of Care Reviewed With: patient  Overall Patient Progress: improving  Goal: Patient-Specific Goal (Individualized)  Description: You can add care plan individualizations to a care plan. Examples of Individualization might be:  \"Parent requests to be called daily at 9am for status\", \"I have a hard time hearing out of my right ear\", or \"Do not touch me to wake me up as it startles  me\".  Outcome: Progressing  Goal: Absence of Hospital-Acquired Illness or " Injury  Outcome: Progressing  Intervention: Prevent Skin Injury  Recent Flowsheet Documentation  Taken 11/11/2024 2000 by Leticia Dowd, RN  Body Position: position changed independently  Intervention: Prevent Infection  Recent Flowsheet Documentation  Taken 11/11/2024 2000 by Leticia Dowd, RN  Infection Prevention:   rest/sleep promoted   hand hygiene promoted  Goal: Optimal Comfort and Wellbeing  Outcome: Progressing  Goal: Readiness for Transition of Care  Outcome: Progressing

## 2024-11-12 NOTE — PLAN OF CARE
Goal Outcome Evaluation:      Plan of Care Reviewed With: patient    Overall Patient Progress: improvingOverall Patient Progress: improving    Outcome Evaluation: A/O up ad anu denies pain    1830 Removed coban wrap from left lower leg. Scab present. Left open to air for now and socks on covering it.

## 2024-11-12 NOTE — ED TRIAGE NOTES
Patient reports he was discharged from hospital 2 hours prior to arrival. Patient picked a scab on his left ankle that would not stop bleeding. After arriving home, patient noted that his wound started to bleed again. On arrival to ER, no active bleeding present. New dressing applied. CMS intact to LLE.

## 2024-11-12 NOTE — ED PROVIDER NOTES
Emergency Department Note      History of Present Illness     Chief Complaint   Wound Check      HPI   Connor Alfredo Srinivasan is a 79 year old male with a history of hypertension who presents for evaluation of bleeding wound.  Patient was discharged from the hospital this morning after being admitted for a bleeding varicose vein, hypotension and an elevated troponin.  He was evaluated by general surgery for the bleeding varicose vein who did not recommend suture at bedside and advised the patient to follow up in clinic for reevaluation. He notes that when he got home he put a sock on over his left ankle and following this noticed that his sock was filled with blood.  He applied pressure and then presented to the ER for further evaluation given persistent bleeding.  Patient states that he has fatigue, but is otherwise asymptomatic.  He denies any fever, chest pain, shortness of breath, leg swelling, lightheadedness, syncope, or vomiting.    Independent Historian   None    Review of External Notes   11/11/24-11/12/24: Patient admitted to the hospital for bleeding varicose vein, hypotension and elevated troponin. Echocardiogram performed, ascending aorta dilatation present, no pericardial effusion, rhythm undetermined.     Past Medical History     Medical History and Problem List   Past Medical History:   Diagnosis Date    Chronic rhinitis     First degree AV block     Hyperlipidemia     Hypertension     Osteoarthritis     Type 2 diabetes mellitus        Medications   ASPIRIN 81 MG OR TABS  atenolol (TENORMIN) 50 MG tablet  cetirizine (ZYRTEC) 10 MG tablet  hydrochlorothiazide (HYDRODIURIL) 25 MG tablet  simvastatin (ZOCOR) 40 MG tablet  tadalafil (CIALIS) 20 MG tablet        Surgical History   Past Surgical History:   Procedure Laterality Date    ARTHROPLASTY KNEE Bilateral     2003/2004    COLONOSCOPY N/A 06/28/2018    Normal. Procedure: COLONOSCOPY;  Colonoscopy;  Surgeon: Benigno Landaverde MD;  Location: [facility]     PHACOEMULSIFICATION CLEAR CORNEA WITH STANDARD INTRAOCULAR LENS IMPLANT Right 06/12/2017    Procedure: PHACOEMULSIFICATION CLEAR CORNEA WITH STANDARD INTRAOCULAR LENS IMPLANT;  RIGHT PHACOEMULSIFICATION CLEAR CORNEA WITH STANDARD INTRAOCULAR LENS IMPLANT ;  Surgeon: John Sanchez MD;  Location: [facility]    PHACOEMULSIFICATION CLEAR CORNEA WITH STANDARD INTRAOCULAR LENS IMPLANT Left 06/28/2017    Procedure: PHACOEMULSIFICATION CLEAR CORNEA WITH STANDARD INTRAOCULAR LENS IMPLANT;  LEFT PHACOEMULSIFICATION CLEAR CORNEA WITH STANDARD INTRAOCULAR LENS IMPLANT ;  Surgeon: John Sanchez MD;  Location: [facility]    PILONIDAL CYST / SINUS EXCISION  1966       Physical Exam     Patient Vitals for the past 24 hrs:   BP Temp Temp src Pulse Resp SpO2   11/12/24 1729 129/72 -- Temporal -- 16 --   11/12/24 1408 108/54 -- -- -- 16 --   11/12/24 1156 123/73 -- -- -- -- --   11/12/24 1155 -- 97.5  F (36.4  C) Temporal 81 16 99 %     Physical Exam  General: Alert, well developed, well nourished. Cooperative.     In mild distress  HEENT:  Head:  Atraumatic  Ears:  External ears are normal  Eyes:   Conjunctivae normal and EOM are normal. No scleral icterus.    Pupils are equal, round, and reactive to light.   Neck:   Normal range of motion. Neck supple.  CV:  Normal rate, regular rhythm, normal heart sounds and radial pulses are 2+ and symmetric.  No murmur.  Resp:  Breath sounds are clear bilaterally    Non-labored, no retractions or accessory muscle use  MS:  LLE: There is a small 0.5 cm healed wound over the lateral distal calf. No active bleeding. Normal range of motion. No edema.    Back atraumatic.  Skin:  Warm and dry.  No rash or lesions noted.  Neuro:   Alert. Normal strength.  Psych: Normal mood and affect.    Diagnostics     Lab Results   Labs Ordered and Resulted from Time of ED Arrival to Time of ED Departure   BASIC METABOLIC PANEL - Abnormal       Result Value    Sodium 140      Potassium 4.0      Chloride  109 (*)     Carbon Dioxide (CO2) 21 (*)     Anion Gap 10      Urea Nitrogen 21.3      Creatinine 0.68      GFR Estimate >90      Calcium 8.3 (*)     Glucose 110 (*)    TROPONIN T, HIGH SENSITIVITY - Abnormal    Troponin T, High Sensitivity 64 (*)    CBC WITH PLATELETS AND DIFFERENTIAL - Abnormal    WBC Count 4.6      RBC Count 3.61 (*)     Hemoglobin 11.6 (*)     Hematocrit 35.2 (*)     MCV 98      MCH 32.1      MCHC 33.0      RDW 13.1      Platelet Count 116 (*)     % Neutrophils 80      % Lymphocytes 8      % Monocytes 11      % Eosinophils 1      % Basophils 0      % Immature Granulocytes 0      NRBCs per 100 WBC 0      Absolute Neutrophils 3.7      Absolute Lymphocytes 0.4 (*)     Absolute Monocytes 0.5      Absolute Eosinophils 0.0      Absolute Basophils 0.0      Absolute Immature Granulocytes 0.0      Absolute NRBCs 0.0     MAGNESIUM - Abnormal    Magnesium 1.6 (*)        Imaging   Exercise Stress Echocardiogram    (Results Pending)     EKG   ECG results from 11/12/24   EKG 12-lead, tracing only     Value    Systolic Blood Pressure     Diastolic Blood Pressure     Ventricular Rate 59    Atrial Rate 59    ID Interval 416    QRS Duration 86        QTc 411    P Axis 58    R AXIS -16    T Axis 42    Interpretation ECG      Sinus bradycardia with marked sinus arrhythmia with 1st degree A-V block  Otherwise normal ECG  When compared with ECG of 11-Nov-2024 04:45,  No significant change was found  Unconfirmed report - interpretation of this ECG is computer generated - see medical record for final interpretation  Confirmed by - EMERGENCY ROOM, PHYSICIAN ([id number]),  VIKTOR MCKAY ([id number]) on 11/12/2024 2:55:40 PM  Interpreted by Dr. Dino Nunez     Independent Interpretation   None    ED Course      Medications Administered   Medications   magnesium oxide (MAG-OX) tablet 400 mg (400 mg Oral $Given 11/12/24 0822)       Procedures   Procedures     Discussion of Management   Patient was seen in conjunction with  Dr. Dino Nunez.     ED Course        Additional Documentation  None    Medical Decision Making / Diagnosis     CMS Diagnoses: None    MIPS       None    [facility]   Connor Srinivasan is a 79 year old male with a history of hypertension who presents for evaluation of bleeding wound.  On exam, the patient's wound from a varicose vein was evaluated and did not have any recurrent bleeding while in the emergency department.  Xeroform, gauze, and pressure dressing was placed over the wound.  Vital signs are within normal limits without hypotension, fever, tachycardia, or hypoxia.  The patient had left the hospital this morning after being admitted for the bleeding wound from the varicose vein, hypotension, and elevated troponin.  Blood work shows improved hemoglobin and troponin without any other concerning electrode abnormalities.  There is no evidence leukocytosis.  EKG shows first-degree AV block and magnesium is mildly decreased.  We provided the patient with tablet of magnesium here.  In regards to the varicose vein wound this appears to be resolved and the patient was given Surgifoam to place over this if the  bleeding recurs.  Patient's troponin is improved and he has not had any chest pain, shortness of breath, diaphoresis, nausea, or other symptoms other than fatigue recently.  Given the EKG finding with first-degree AV block along with recent elevated troponin, we offered admission for ongoing observation and cardiac rule out and the patient is in understanding of potential risks of discharging home, but did not want to pursue admission today. We therefore recommended this continue to be monitored with a Zio patch and an order was placed for this.  We also recommended the patient follow-up with an exercise stress test and a referral was placed for this as well along with cardiology follow-up.  Finally, for the varicose vein, vascular referral was placed.  The patient was advised to monitor symptoms closely and  return for fever, recurrent bleeding not resolved with direct pressure/surgifoam (provided to the patient), lightheadedness, syncope, chest pain, shortness of breath, diaphoresis, vomiting, or any new concerns.  The patient and his wife are comfortable with this plan and all questions were answered.    Disposition   The patient was discharged.     Diagnosis     ICD-10-CM    1. Bleeding from varicose veins of left lower extremity  I83.892       2. Fatigue  R53.83 Follow-Up with Cardiology     Exercise Stress Echocardiogram     Zio Patch Mail Out     Primary Care Referral      3. First degree atrioventricular block  I44.0 Follow-Up with Cardiology     Exercise Stress Echocardiogram     Zio Patch Mail Out     Primary Care Referral      4. Bleeding from varicose veins of lower extremity, left  I83.892 Vascular Medicine Referral           WAQAR Martínez Carley J, PA-C  11/12/24 1740

## 2024-11-12 NOTE — PLAN OF CARE
"PRIMARY DIAGNOSIS: VARICOSE VEIN BLEED   OUTPATIENT/OBSERVATION GOALS TO BE MET BEFORE DISCHARGE:  ADLs back to baseline: Yes    Activity and level of assistance: Ambulating independently.    Pain status: Pain free.    Return to near baseline physical activity: Yes     Discharge Planner Nurse   Safe discharge environment identified: Yes  Barriers to discharge: Yes       Entered by: Leticia Dowd RN 11/12/2024 12:01 AM   Patient alert and oriented. Vitally stable on room air. Independent in room. No more signs of bleeding- dressing in left shin clean, dry and intact. Patient denies pain. Cardiac tele in place. Patient calls appropriately.   Please review provider order for any additional goals.   Nurse to notify provider when observation goals have been met and patient is ready for discharge.        Goal Outcome Evaluation:      Plan of Care Reviewed With: patient    Overall Patient Progress: improvingOverall Patient Progress: improving       Problem: Adult Inpatient Plan of Care  Goal: Plan of Care Review  Description: The Plan of Care Review/Shift note should be completed every shift.  The Outcome Evaluation is a brief statement about your assessment that the patient is improving, declining, or no change.  This information will be displayed automatically on your shift  note.  11/11/2024 2357 by Leticia Dowd, RN  Outcome: Progressing  Flowsheets (Taken 11/11/2024 2357)  Plan of Care Reviewed With: patient  Overall Patient Progress: improving  11/11/2024 2215 by Leticia Dowd RN  Outcome: Progressing  Flowsheets (Taken 11/11/2024 2215)  Plan of Care Reviewed With: patient  Overall Patient Progress: improving  Goal: Patient-Specific Goal (Individualized)  Description: You can add care plan individualizations to a care plan. Examples of Individualization might be:  \"Parent requests to be called daily at 9am for status\", \"I have a hard time hearing out of my right ear\", or \"Do not touch me to wake me up as it " "startles  me\".  11/11/2024 2357 by Leticia Dowd RN  Outcome: Progressing  11/11/2024 2215 by Leticia Dowd RN  Outcome: Progressing  Goal: Absence of Hospital-Acquired Illness or Injury  11/11/2024 2357 by Leticia Dowd RN  Outcome: Progressing  11/11/2024 2215 by Leticia Dowd RN  Outcome: Progressing  Intervention: Prevent Skin Injury  Recent Flowsheet Documentation  Taken 11/11/2024 2000 by Leticia Dowd RN  Body Position: position changed independently  Intervention: Prevent Infection  Recent Flowsheet Documentation  Taken 11/11/2024 2000 by Leticia Dowd RN  Infection Prevention:   rest/sleep promoted   hand hygiene promoted  Goal: Optimal Comfort and Wellbeing  11/11/2024 2357 by Leticia Dowd RN  Outcome: Progressing  11/11/2024 2215 by Leticia Dowd RN  Outcome: Progressing  Goal: Readiness for Transition of Care  11/11/2024 2357 by Leticia Dowd RN  Outcome: Progressing  11/11/2024 2215 by Leticia Dowd RN  Outcome: Progressing         "

## 2024-11-12 NOTE — PLAN OF CARE
"Patient's After Visit Summary was reviewed with patient   Patient verbalized understanding of After Visit Summary, recommended follow up and was given an opportunity to ask questions.   Discharge medications sent home with patient/family: Not applicable   Discharged with other:friend      Goal Outcome Evaluation:      Plan of Care Reviewed With: patient    Overall Patient Progress: improvingOverall Patient Progress: improving    Outcome Evaluation: A&O. VSS. LLE vericose bleed w/ moderate-large drainage. Elevated troponin w/ recommendation for outpt stress test.    /65 (BP Location: Right arm)   Pulse 65   Temp 97.7  F (36.5  C) (Oral)   Resp 16   Ht 1.753 m (5' 9\")   Wt 88.9 kg (195 lb 14.4 oz)   SpO2 99%   BMI 28.93 kg/m         Problem: Adult Inpatient Plan of Care  Goal: Plan of Care Review  Description: The Plan of Care Review/Shift note should be completed every shift.  The Outcome Evaluation is a brief statement about your assessment that the patient is improving, declining, or no change.  This information will be displayed automatically on your shift  note.  11/12/2024 1036 by Mary Jane Leblanc, RN  Outcome: Met  11/12/2024 1018 by Mary Jane Leblanc, RN  Outcome: Progressing  Flowsheets (Taken 11/12/2024 1018)  Outcome Evaluation: A&O. VSS. LLE vericose bleed w/ moderate-large drainage. Elevated troponin w/ recommendation for outpt stress test.  Plan of Care Reviewed With: patient  Overall Patient Progress: improving  Goal: Patient-Specific Goal (Individualized)  Description: You can add care plan individualizations to a care plan. Examples of Individualization might be:  \"Parent requests to be called daily at 9am for status\", \"I have a hard time hearing out of my right ear\", or \"Do not touch me to wake me up as it startles  me\".  11/12/2024 1036 by Mary Jane Leblanc, RN  Outcome: Met  11/12/2024 1018 by Mary Jane Leblanc, RN  Outcome: Progressing  Goal: Absence of Hospital-Acquired Illness or " Injury  11/12/2024 1036 by Mary Jane Leblanc RN  Outcome: Met  11/12/2024 1018 by Mary Jane Leblanc, RN  Outcome: Progressing  Intervention: Identify and Manage Fall Risk  Recent Flowsheet Documentation  Taken 11/12/2024 0840 by Mary Jane Leblanc, RN  Safety Promotion/Fall Prevention:   clutter free environment maintained   room door open   room near nurse's station   safety round/check completed   nonskid shoes/slippers when out of bed   patient and family education  Intervention: Prevent Skin Injury  Recent Flowsheet Documentation  Taken 11/12/2024 0840 by Mary Jane Leblanc RN  Body Position: position changed independently  Intervention: Prevent Infection  Recent Flowsheet Documentation  Taken 11/12/2024 0840 by Mary Jane Leblanc RN  Infection Prevention:   rest/sleep promoted   hand hygiene promoted  Goal: Optimal Comfort and Wellbeing  11/12/2024 1036 by Mary Jane Leblanc, RN  Outcome: Met  11/12/2024 1018 by Mary Jane Leblanc, RN  Outcome: Progressing  Goal: Readiness for Transition of Care  11/12/2024 1036 by Mary Jane Leblanc, RN  Outcome: Met  11/12/2024 1018 by Mary Jane Leblanc, RN  Outcome: Progressing

## 2024-11-12 NOTE — DISCHARGE SUMMARY
"[facility]  Hospitalist Discharge Summary      Date of Admission:  11/11/2024  Date of Discharge:  11/12/2024  Discharging Provider: MAK Cyr PA-C  Discharge Service: Hospitalist Service    Discharge Diagnoses   Acute blood loss anemia due to variceal bleed  Elevated troponin   1st degree AV block    Clinically Significant Risk Factors     # Overweight: Estimated body mass index is 28.93 kg/m  as calculated from the following:    Height as of this encounter: 1.753 m (5' 9\").    Weight as of this encounter: 88.9 kg (195 lb 14.4 oz).       Follow-ups Needed After Discharge   Follow-up Appointments       Follow-up and recommended labs and tests       Follow up with primary care provider, Roverto Edwards MD, within 7 days for hospital follow- up.  The following labs/tests are recommended: hemoglobin, outpatient cardiac stress test              Discharge Disposition   Discharged to home  Condition at discharge: Stable    Hospital Course   Connor Srinivasan is a 79 year old male with a history of  htn/hlp, thrombocytopenia 100-120's varicose veins admitted on 11/11/2024 with varicose vein bleed     He picked a scab and as soon as he did he started having blood spurt out from his distal left leg.  He ran into the bathroom and hung his distal leg over the toilet grabbed some TP which immediately got soaked so started grabbing some towels and apparently soaked through a bunch of them.  He's only on asa 81 mg per day.  EMS was called and they estimated ~ 200 ml blood loss.  Bleeding was controlled prior to arriving in the ER.     In the ER VS initially ok and was given 1 L of NS. Labs notable for acute anemia but otherwise were ok. Plan was initially to discharge him home but then he dropped his BPs into the 80's/50's and so received another 1 L bolus with good response and an expanded work up which was notable for worsening of his anemia 13.1->11.5 over 90 minute period and troponin was " "elevated at 84. EKG was NSR with an impressive 1* AVB.    Third troponin was downtrending.  He continued to be asymptomatic.  Elevation in troponin could be due to hypotension/blood loss and demand ischemia.  He should definitely have outpatient stress testing though.  Echocardiogram showed an EF of 60 to 65% with no wall motion abnormalities.  As for the variceal bleed he did have some minor bleeding overnight prior to me picking up the patient today.  Upon my evaluation this morning there is a small area of skin thinning over the varicose vein with no bleeding.  Did touch base with general surgery who would not do any intervention and recommended outpatient vascular follow-up.  I did recommend the patient go to the [facility] ED if he was to have repeat bleeding as they do have vascular surgery capabilities.  Also recommended to keep pressure on the site for an additional 48 hours and educated the patient on how to stop the bleed if it was to bleed again    Elevated troponin   Moderately elevated but asx.? Due to hypotension/blood loss.  Absolutely no sx.  I suspect this may represent some \"unmasking\" of CAD.  He has no hx of it and is compliant with his htn and hlp medications.  -serial trops, echo, tele       *resumed pta asa given rising troponin 84->132.   -he states he can walk on a treadmill which would be reasonable to do at some point. If trops are flat and echo is normal consider OP stress echo.      ABL 2/2 variceal bleed   Apparently this has happened to him in the past. Of note baseline hgb is in the 14-15 range.  On presentation 13.1 -> 11.5 within about 90 minutes.      Htn, hlp  Hold BP meds for now (he reports that he is on atenolol and hydrochlorothiazide)  Would resume simva when med rec complete    Chronic thrombocytopenia   At baseline, complicates above      Consultations This Hospital Stay   SURGERY GENERAL IP CONSULT    Code Status   Full Code    Time Spent on this Encounter   MAK DANIEL " WAQAR Cyr, personally saw the patient today and spent less than or equal to 30 minutes discharging this patient.       MAK Cyr PA-C  [facility] OBSERVATION DEPT  [address]  [facility] [address]  Phone: [phone]  ______________________________________________________________________    Physical Exam   Vital Signs: Temp: 97.8  F (36.6  C) Temp src: Oral BP: 126/63 Pulse: 62   Resp: 16 SpO2: 100 % O2 Device: None (Room air)    Weight: 195 lbs 14.4 oz    GENERAL:  Alert, Comfortable, No acute distress. Sitting up in chair  PSYCH: pleasant, oriented.  HEART:  Normal S1, S2 with no murmur, RRR  LUNGS:  Normal Respiratory effort. Clear to auscultation bilaterally with no wheezing, rales or ronchi..   EXTREMITIES:  Left lateral ankle small ulceration with no bleeding over varicose vein  SKIN:  Warm, dry to touch. No rash.  NEUROLOGIC: Speech clear, alert & orientated x 4    Primary Care Physician   Roverto Edwards MD    Discharge Orders      Vascular Surgery Referral      Follow-up and recommended labs and tests     Follow up with primary care provider, Roverto Edwards MD, within 7 days for hospital follow- up.  The following labs/tests are recommended: hemoglobin, outpatient cardiac stress test     Activity    Your activity upon discharge: activity as tolerated, keep pressure on varicose vein for 1-2 days     When to contact your care team    Go to the [facility] ED if you have any of the following: uncontrolled varicose vein bleeding, dizziness, feel like may faint as they have vascular surgery capabilities, go to the ED if you have chest pain, shortness of breath     Wound care and dressings    Instructions to care for your wound at home: apply compression for 1-2 days, if bleeding reoccurs apply direct pressure and seek medical care     Reason for your hospital stay    Varicose vein bleed, elevated troponin     Diet    Follow this diet upon discharge: Regular       Significant  Results and Procedures   Results for orders placed or performed during the hospital encounter of 24   Chest XR,  PA & LAT    Narrative    EXAM: XR CHEST 2 VIEWS  LOCATION: [facility]  DATE: 2024    INDICATION: elevated Trop, weakness  COMPARISON: Two-view chest radiograph 2023      Impression    IMPRESSION: Normal heart size without pulmonary vascular congestion. No focal pulmonary consolidation or pleural effusion. No pneumothorax. Atherosclerotic calcifications of the aortic arch. Degenerative changes of the thoracic spine with flowing   anterior osteophytes. No acute osseous abnormality.   Echocardiogram Complete     Value    LVEF  60-65%    Narrative    [id number]  [id number]  [id number]  [id number]^KEELY^ESTELLE^JENNY     [facility]  Echocardiography Laboratory  [address] [place] [address]     Name: JASEN BARBER  MRN: [id number]  : 1945  Study Date: 2024 09:23 AM  Age: 79 yrs  Gender: Male  Patient Location: [facility]  Reason For Study: Elevated Troponin  Ordering Physician: ESTELLE RIGGS  Referring Physician: Roverto Edwards  Performed By: Taylor Acosta RDCS     BSA: 2.0 m2  Height: 69 in  Weight: 192 lb  HR: 72  BP: 105/59 mmHg  ______________________________________________________________________________  Procedure  Complete Portable Echo Adult. Optison (NDC #[id number]) given intravenously.  ______________________________________________________________________________  Interpretation Summary     Technically challenging study due to patient body habitus.     Left ventricular systolic function is normal. The visual ejection fraction is  60-65%. No regional wall motion abnormalities noted.  Right ventricular global function is normal.  No significant valvular abnormalities.  Ascending aorta dilatation is present. Max diameter of the visualized portion  4 cm.  There is no pericardial effusion.  Rhythm undetermined, consider ECG.      There are no prior studies available for comparison.  ______________________________________________________________________________  Left Ventricle  The left ventricle is normal in size. A sigmoid septum is present. Left  ventricular systolic function is normal. The visual ejection fraction is 60-  65%. Diastolic Doppler findings (E/E' ratio and/or other parameters) suggest  left ventricular filling pressures are normal. No regional wall motion  abnormalities noted.     Right Ventricle  The right ventricle is borderline dilated. The right ventricular systolic  function is normal.     Atria  Normal left atrial size. Right atrial size is normal.     Mitral Valve  There is mild mitral annular calcification. There is trace mitral  regurgitation.     Tricuspid Valve  There is mild (1+) tricuspid regurgitation. The right ventricular systolic  pressure is approximated at 18.7 mmHg plus the right atrial pressure.     Aortic Valve  The aortic valve is trileaflet with aortic valve sclerosis. No aortic  regurgitation is present.     Pulmonic Valve  The pulmonic valve is not well seen, but is grossly normal.     Vessels  The aortic root is normal size. Ascending aorta dilatation is present. Max  diameter of the visualized portion 4 cm. Inferior vena cava not well  visualized for estimation of right atrial pressure.     Pericardium  There is no pericardial effusion.     Rhythm  Rhythm undetermined, consider ECG.  ______________________________________________________________________________  MMode/2D Measurements & Calculations  IVSd: 1.4 cm     LVIDd: 3.3 cm  LVIDs: 2.8 cm  LVPWd: 0.86 cm  IVC diam: 1.7 cm  FS: 16.1 %  LV mass(C)d: 109.6 grams  LV mass(C)dI: 54.0 grams/m2  Ao root diam: 3.9 cm  Ao root diam index Ht(cm/m): 2.2  Ao root diam index BSA (cm/m2): 1.9  RV Base: 4.1 cm  RWT: 0.52  TAPSE: 2.0 cm     Doppler Measurements & Calculations  MV E max mikayla: 75.4 cm/sec  MV A max mikayla: 89.1 cm/sec  MV E/A: 0.85  MV max PG:  3.6 mmHg  MV mean P.2 mmHg  MV V2 VTI: 17.8 cm  MV dec time: 0.21 sec  PA acc time: 0.10 sec  TR max barber: 216.1 cm/sec  TR max P.7 mmHg     E/E' av.6  Lateral E/e': 7.6  Medial E/e': 9.5  RV S Barber: 11.9 cm/sec     ______________________________________________________________________________  Report approved by: Davey Russo 2024 11:13 AM             Discharge Medications   Current Discharge Medication List        CONTINUE these medications which have NOT CHANGED    Details   ASPIRIN 81 MG OR TABS ONE DAILY  Qty: 100, Refills: 3    Associated Diagnoses: Routine general medical examination at a health care facility; Other and unspecified hyperlipidemia      atenolol (TENORMIN) 50 MG tablet Take 1 tablet (50 mg) by mouth daily  Qty: 90 tablet, Refills: 3    Associated Diagnoses: Essential hypertension      cetirizine (ZYRTEC) 10 MG tablet Take 10 mg by mouth daily.      hydrochlorothiazide (HYDRODIURIL) 25 MG tablet Take 1 tablet (25 mg) by mouth daily  Qty: 90 tablet, Refills: 3    Associated Diagnoses: Essential hypertension      simvastatin (ZOCOR) 40 MG tablet Take 1 tablet (40 mg) by mouth at bedtime  Qty: 90 tablet, Refills: 3    Associated Diagnoses: Hyperlipidemia LDL goal <130      tadalafil (CIALIS) 20 MG tablet TAKE ONE TABLET BY MOUTH ONE TIME DAILY. TAKE AT LEAST 30 MINUTES BEFORE INTERCOURSE  Qty: 20 tablet, Refills: 1    Associated Diagnoses: Impotence of organic origin           Allergies   Allergies   Allergen Reactions    No Known Drug Allergy

## 2024-11-12 NOTE — PLAN OF CARE
"PRIMARY DIAGNOSIS: Varicose Vein Bleed  OUTPATIENT/OBSERVATION GOALS TO BE MET BEFORE DISCHARGE:  ADLs back to baseline: Yes    Activity and level of assistance: Ambulating independently.    Pain status: Pain free.    Return to near baseline physical activity: Yes     Discharge Planner Nurse   Safe discharge environment identified: Yes  Barriers to discharge: Yes - Possible gen surg consult        Entered by: Mary Jane Leblanc RN 11/12/2024   /63 (BP Location: Right arm)   Pulse 62   Temp 97.8  F (36.6  C) (Oral)   Resp 16   Ht 1.753 m (5' 9\")   Wt 88.9 kg (195 lb 14.4 oz)   SpO2 100%   BMI 28.93 kg/m    Pt A&Ox4. VSS; on room air. LLE varicose vein bleed w/ moderate to large bloody output. ABD dressing and ACE wrap changed and reinforced.  Elevated troponin; recommending outpatient stress test. Tele- NS. Pt denied all pain, SOB, N/V/D. Independent in room. Regular diet. Possible gen surg consult prior to anticipated discharge today for LLE bleed. Call light in reach; pt able to make needs known.     Goal Outcome Evaluation:      Plan of Care Reviewed With: patient    Overall Patient Progress: improvingOverall Patient Progress: improving    Outcome Evaluation: A&O. VSS. LLE vericose bleed w/ moderate-large drainage. Elevated troponin w/ recommendation for outpt stress test.      Problem: Adult Inpatient Plan of Care  Goal: Plan of Care Review  Description: The Plan of Care Review/Shift note should be completed every shift.  The Outcome Evaluation is a brief statement about your assessment that the patient is improving, declining, or no change.  This information will be displayed automatically on your shift  note.  Outcome: Progressing  Flowsheets (Taken 11/12/2024 1018)  Outcome Evaluation: A&O. VSS. LLE vericose bleed w/ moderate-large drainage. Elevated troponin w/ recommendation for outpt stress test.  Plan of Care Reviewed With: patient  Overall Patient Progress: improving  Goal: Patient-Specific " "Goal (Individualized)  Description: You can add care plan individualizations to a care plan. Examples of Individualization might be:  \"Parent requests to be called daily at 9am for status\", \"I have a hard time hearing out of my right ear\", or \"Do not touch me to wake me up as it startles  me\".  Outcome: Progressing  Goal: Absence of Hospital-Acquired Illness or Injury  Outcome: Progressing  Intervention: Identify and Manage Fall Risk  Recent Flowsheet Documentation  Taken 11/12/2024 0840 by Mary Jane Leblanc RN  Safety Promotion/Fall Prevention:   clutter free environment maintained   room door open   room near nurse's station   safety round/check completed   nonskid shoes/slippers when out of bed   patient and family education  Intervention: Prevent Skin Injury  Recent Flowsheet Documentation  Taken 11/12/2024 0840 by Mary Jane Leblanc, RN  Body Position: position changed independently  Intervention: Prevent Infection  Recent Flowsheet Documentation  Taken 11/12/2024 0840 by Mary Jane Leblanc, RN  Infection Prevention:   rest/sleep promoted   hand hygiene promoted  Goal: Optimal Comfort and Wellbeing  Outcome: Progressing  Goal: Readiness for Transition of Care  Outcome: Progressing         "

## 2024-11-12 NOTE — DISCHARGE INSTRUCTIONS
Return for fever, chest pain, shortness of breath, recurrent bleeding that is not controlled with direct pressure, fainting, lightheadedness, or any other new concerns

## 2024-11-12 NOTE — CONSULTS
Surgery-[facility]  Seen with Kaitlyn Cyr PA-C regarding left lower leg bleed; known varicose veins and prior history of a scab at this site. Evidently he picked the area and had an acute bleed which led to moderate blood loss at home. It has been hemostatic since admission with the exception of blood on the bandage this AM. On exam there is a 3mm area of thinning and ulceration in the lateral lower left leg which is dry and does not grossly appear to be overlying a distinct dilated vein. I am hesitant to suture this at the bedside as it may lead to bleeding in an otherwise hemostatic wound. I discussed applying direct digital pressure if he re-bleeds and following up with the vein clinic in [place].

## 2024-11-13 ENCOUNTER — OFFICE VISIT (OUTPATIENT)
Dept: VASCULAR SURGERY | Facility: CLINIC | Age: 79
End: 2024-11-13
Payer: COMMERCIAL

## 2024-11-13 ENCOUNTER — PATIENT OUTREACH (OUTPATIENT)
Dept: CARE COORDINATION | Facility: CLINIC | Age: 79
End: 2024-11-13

## 2024-11-13 DIAGNOSIS — I83.892 BLEEDING FROM VARICOSE VEINS OF LOWER EXTREMITY, LEFT: Primary | ICD-10-CM

## 2024-11-13 PROCEDURE — 36471 NJX SCLRSNT MLT INCMPTNT VN: CPT | Performed by: SURGERY

## 2024-11-13 PROCEDURE — 99203 OFFICE O/P NEW LOW 30 MIN: CPT | Mod: 25 | Performed by: SURGERY

## 2024-11-13 NOTE — PROGRESS NOTES
The pleasure of seeing Mr. Connor Srinivasan in the vein clinic today.  He is a 79-year-old gentleman who comes to us for treatment of bleeding around his left ankle.  This has been going on and off for the past 7 years when he had his initial episode of bleeding.  He had noticed an area of a scab in that area which he picked and that resulted in the significant bleeding.  He did have going to the emergency department at that time.  Recently he was admitted under observation for a similar episode of bleeding in the same area.  After he went home last night it bled again.    On examination he has changes of chronic venous insufficiency with lipodermatosclerosis and hyperpigmentation in both lower extremities.  The area in question is located on the lateral lower leg just above the lateral malleolus.    Because of the recurrent hemorrhage my recommendation would be to proceed with injection sclerotherapy which would be done today.    Vein Clinic Procedure Note      Sclerotherapy    Date/Time: 11/13/2024 3:21 PM    Performed by: Ishaan Hong MD  Authorized by: Ishaan Hong MD    Type:  Medically Necessary  Vein:  Multiple Veins  Procedure side:  Left  Solution/Amount:  .5 POLIDOCANOL  Syringes:  4  Wrap/Hose:  Wraps

## 2024-11-13 NOTE — PATIENT INSTRUCTIONS
Sclerotherapy: Pre-Treatment Instructions    Recommended Sessions:  ___1+___ treatment sessions    Pricing: Your insurance will be billed for this treatment    Time Required per Treatment Session - About 45 minutes  Please come in 15 minutes before your scheduled appointment.  30 min.  Sclerotherapy treatments last approximately 30 minutes.  5 min.    A staff member will wipe your legs off with warm water and dry them with a wash cloth.                 Then you can put your compression hose on, get dressed and check out.  10 min.  After your treatment, you will be asked to walk around for 10 minutes before you get in your car.    Medications  Five days before your appointment, discontinue aspirin (Bufferin, Anacin, etc.) and Ibuprofen (Motrin, Advil, Aleve, etc.) to reduce bruising. Resume these medications the day following the treatment.    Leg Preparation  Do not shave your legs or apply any oil, lotion or powder the night before or the day of your treatment.    Clothing  Shorts:  Bring a pair of loose, comfortable shorts to wear during your treatment (or you can choose to wear ours). Shoes: Bring comfortable shoes to accommodate the compression hose after your treatment. Do not wear flip-flops or thong-style sandals unless you have open-toe compression hose.    Photographs  Photos will be taken before each treatment. This helps monitor your progress.    Injections  The physician will inject your veins with the sclerotherapy solution chosen to meet your specific needs.    Compression Hose  Please bring your compression hose if you have them. They may also be reserved for you at our clinic. Compression hose must be worn immediately after each sclerotherapy treatment.  The hose must be compression level 20-30, and they must be worn for 24 hours straight after your treatment.  If you have never worn compression hose before, a staff member will teach you how to put them on.             You cannot have a treatment  without compression hose.               They are critical to the success of your treatment!    You may purchase your compression hose from us. We will measure you and have the hose available when you come for your treatment.    Cancellation and Rescheduling  If you need to cancel or reschedule your sclerotherapy treatment, please give our office at least 24 hour notice.    Sclerotherapy: Basic Information    What is sclerotherapy?  Sclerotherapy is a treatment for  spider  veins.  Spider  veins are small veins just under your skin that can look red, blue or purple. Most  spider  veins are only a cosmetic problem.  Spider  veins are not useful and treating them will not affect your circulation.    How does sclerotherapy work?  1.  Injections: A very small needle is used to inject a solution into the  spider  veins. The solution irritates the cells that line the vein walls. This causes the veins to collapse. The vein walls to stick together and they can no longer carry blood. Different solutions are used based on the size of the veins.  2.  Compression:  The spider veins are kept collapsed by wearing compression stockings. Your body will break down and absorb the treated veins. You wear the compression hose for 24 hours after the treatment and then for 4 more days during your waking hours only.    How does the body heal after sclerotherapy?  The process is similar to how your body heals after a bad bruise. It takes 4-6 weeks or more for the healing to be complete. When the healing is complete, the vein is no longer visible. It may take more than one treatment.    How do I get the best results?  It is important to follow the post-sclerotherapy instructions. The best results require time and patience. The injection sites will continue to heal and fade for months after a treatment. Please discuss your expectations with your doctor to keep them realistic. Your doctor will do everything possible to meet or exceed your  expectations.    How many treatments are needed?  After your initial exam, your doctor will give you an estimate of the number of treatments that may be required. It depends upon the size, type, and quantity of your  spider  veins and on the doctor's assessment, your history and expectations. You may end up needing fewer or more treatments.    How soon can I have another treatment?  Additional treatments are scheduled every 4-6 weeks to allow time for the body to respond to the previous treatment.    Common Side Effects:  Itching  The areas that were injected may itch. This is usually mild and lasts less than a day. Do not use lotions or creams on your legs until the injection sites have healed over.    Pain  It is common to have some tenderness at the injection sites. Injection of the solution can be uncomfortable, but is usually well tolerated by most patients. The tenderness is temporary, lasting 24 hours at most. Tylenol or Ibuprofen can be used, if needed, following the product directions.    Bruising  This may occur at the injections sites. Bruising may be minimized by avoiding Aspirin and Ibuprofen products for five days before each treatment session.    Hyperpigmentation  A light brown discoloration of the skin may develop along the veins in the areas injected. Approximately 20-30% of patients treated note the discoloration (which is lighter and less obvious than the veins that are being treated). The hyperpigmentation usually fades in a couple of weeks, but may take several months to a year to totally resolve. There is a 1% chance of hyperpigmentation continuing after one year.    Trapped blood  A small amount of blood may become trapped and hardened in the veins. This may feel like a knot or cord and it may look dark blue or bruised. This is a common occurrence. You may need to return before your next treatment so this area can be drained to remove the trapped blood. This will reduce the hyperpigmentation  that can occur. The chance of this occurring can be decreased with proper use of compression hose after your treatment.    Matting  Matting is the formation of new, fine  spider  veins in the area injected.  It occurs in approximately 10% of patients injected. The exact reason for this is unknown. If untreated, the matting usually resolves in 3 to 12 months, but very rarely, it can be permanent. If the matting does not fade, it can be re-injected.    Rare Side Effects:  Ulceration at injection sites  Very rarely, a small ulcer will occur at the site where a vein is injected. An ulcer can take 4 to 6 weeks to completely heal. A small scar may result.    Allergic reactions  There is a very rare incidence of an allergic reaction to the solution injected. You will be observed for such reaction and will be treated appropriately should it occur. Please inform us of any allergy history.    Pulmonary embolus/Deep vein thrombosis  This is a blood clot which moves to the lungs/a blood clot in the deep vein system. There is an extraordinarily low incidence of this complication.      SCLEROTHERAPY AFTER CARE  Immediately:  After treatment, walk for 10-15 minutes before getting in your car.  If your trip home is more than 1 hour, stop and walk around for 5-10 minutes. Avoid sitting or standing for extended periods.   First 24 hours: Wear your compression continuously, even while in bed. After the 24 hours, you may shower if you want to. Put your hose back on, unless you are going to bed. You should NOT wear compression to bed after the first 24 hours. You may fly the next day, but wear your compression.   For 5 days: Wear the compression hose for waking hours only. You may continue to wear them longer than 5 days if you prefer.   For days 5-7: Walking is encouraged, as it promotes efficient circulation in your veins. You may do activities that raise your heart rate, but do NOT run, jog, do high impact aerobics, or weight  lifting. After 7 days, no activity restrictions.  Shaving: Wait a few days to shave or apply lotion.   Bathing: Do NOT take hot baths or sit in a hot tub for 7-10 days.    For 1 year: Wear SPF 30 sunscreen on your legs when in the sun. This is very important! It helps prevent darkening of the skin at the injection sites.   Medications: You may resume your usual medications, including aspirin or ibuprofen.    Common Things to Expect       Compression must be worn for the first 24 hours and then during the day for 5-7 days.    If larger veins are treated with ultrasound-guided sclerotherapy, you will have redness, firmness, tenderness, and swelling.  This firmness and tenderness may take 3-6 months to resolve. Ibuprofen and compression hose will aid in this process.    You will have bruising that can last up to 3 weeks. Most fading of the veins will occur between 3 and 6 weeks after treatment.    You may notice brown discoloration (hyperpigmentation) at the treatment site.  This should fade with time, but will take 3 months to 1 year to fully heal.     Some treated veins may look darker because of trapped blood within the vein. This trapped blood can be removed at a minimum of 1 month following treatment. Larger veins are more likely to develop trapped blood.    It is very important for you to use at least SPF 30 sunscreen in order to help prevent the discoloration of your skin.    Migraines rarely occur following sclerotherapy, but are more likely in patients with a history of migraines.  Treat as you would any other migraine.   Knee High, medical grade, 20-30 mmHg strength, compression stockings are recommended (may be required if having a vein procedure or treatment)    Options for purchasing compression stockings:    Call your insurance company and ask if compression stockings are covered.  If they are covered, they usually fall under your Durable Medical Equipment (DME) coverage.  The DME codes if they ask are:  Knee high , Thigh high , Panty .   Be sure to ask if you need a specific medical diagnosis for coverage, if your deductible needs to be met first, how many pairs are covered and how often you can get them.  If insurance covers them and you would like to order from [employer], or another medical supply company: contact the vein clinic and we will fax a prescription to your medical supply company of choice. They will bill your insurance and ship them to you. Please let us know your color choice (black or beige), and toe choice (open or closed toe) when contacting us.    2.  We sell regular sizes of [employer] in our clinic (except specialty order or petite sizing). Ask us to check if we have your size. We cannot bill your insurance for these purchases.  Knee high are $60/pair  Thigh high are $85/pair.   If you would like to purchase from the clinic, let us know including your color choice (black or beige), and toe choice (open or closed toe). We will set them aside for you to  and pay for at your next clinic appointment or the day of your procedure.    3.  Online website ordering options:   [email or web address] has many options available.

## 2024-11-13 NOTE — PROGRESS NOTES
After Visit Follow Up  Per Dr. Hong, patient was recommended to have 1 Sessions of medically necessary sclerotherapy.    Reviewed with and gave to patient our vein clinic sclerotherapy packet of information which includes basic sclerotherapy information, pre-treatment instructions and post-treatment instructions.    Patient in agreement with plan and had no further questions.    Razia Joshua RN on 11/13/2024 at 3:54 PM

## 2024-11-13 NOTE — ED PROVIDER NOTES
ED APC SUPERVISION NOTE:   I evaluated this patient in conjunction with Lizbeth Marcial PA-C  I have participated in the care of the patient and personally performed key elements of the history, exam, and medical decision making.      HPI:   Connor Srinivasan is a 79 year old male with a history of diabetes, hyperlipidemia, not on anticoagulation recently hospitalized for bleeding varicose vein and discharged from hospital earlier today who presents for bleeding from the same varicose vein.  Patient did also have an elevation in troponin during his hospitalization, it appears that he has an outpatient stress test arranged.  In the emergency department, patient states that he is feeling at his baseline and well.  He no longer is having active bleeding after dressing placed here.  He notes that he was just taking off his pants and shoes and the dressing was loose thus he thinks he reaggravated it and it started to bleed.  He denies any chest pain, shortness of breath, weakness anywhere.    Independent Historian:   None    Review of External Notes:   I reviewed the ED to hospital admission from 11/11 - 11/12     EXAM:   General: Overall stable and nontoxic appearing  HEENT: Conjunctivae clear, no scleral icterus, mucous membranes moist  Neuro: Alert, moving all extremities equally with intention  CV: Regular rate and rhythm, radial and DP pulses equal  Respiratory: No signs of respiratory distress, lungs clear to auscultation bilaterally   Abdomen: Soft, without rigidity or rebound throughout  Lower extremity: Left lower extremity pressure dressing in place, I unwrapped this there is no active bleeding    Independent Interpretation (X-rays, CTs, rhythm strip):  None        ECG results from 11/12/24   EKG 12-lead, tracing only     Value    Systolic Blood Pressure     Diastolic Blood Pressure     Ventricular Rate 59    Atrial Rate 59    IA Interval 416    QRS Duration 86        QTc 411    P Axis 58    R  AXIS -16    T Axis 42    Interpretation ECG      Sinus bradycardia with marked sinus arrhythmia with 1st degree A-V block  No evidence of ischemia            Consultations/Discussion of Management or Tests:  None     MEDICAL DECISION MAKING/ASSESSMENT AND PLAN:   79-year-old male discharged from the hospital earlier today who presents with recurrent bleeding from varicose vein of the left leg.  Here in the emergency department bleeding is stopped.  Patient was discharged with troponins in the 100s, this was repeated here and reassuringly has down trended.  He has no chest pain shortness of breath or any other symptoms that would be suspicious for ACS.  He feels otherwise well and is ambulating easily.  I do see that on his EKG he has a sinus rhythm with noticeable arrhythmia, will give a Zio patch for monitoring and instructions to follow-up with primary care provider for this.  He does not feel faint or lightheaded or if he has any palpitations.  I did offer admission to the hospital again for further troponin trending and observation from the perspective of the bleeding vein however he would prefer to go home.  Magnesium repleted prior to discharge  He has vascular surgery follow-up.  Strict return precautions discussed and he is wife verbalized understanding.     DIAGNOSIS:     ICD-10-CM    1. Bleeding from varicose veins of left lower extremity  I83.892       2. Fatigue  R53.83 Follow-Up with Cardiology     Exercise Stress Echocardiogram     Zio Patch Mail Out     Primary Care Referral      3. First degree atrioventricular block  I44.0 Follow-Up with Cardiology     Exercise Stress Echocardiogram     Zio Patch Mail Out     Primary Care Referral      4. Bleeding from varicose veins of lower extremity, left  I83.892 Vascular Medicine Referral            Pham Nunez MD  11/12/2024  [facility] EMERGENCY DEPT      Pham Langford MD  11/13/24 [id number]

## 2024-11-13 NOTE — PROGRESS NOTES
[facility]: [facility]    Background: Transitional Care Management program identified per system criteria and reviewed by [facility] team for possible outreach.    Assessment: Upon chart review, [facility] Team member will not proceed with patient outreach related to this episode of Transitional Care Management program due to reason below:    Patient has a follow up appointment with an appropriate provider today for hospital discharge    Plan: Transitional Care Management episode addressed appropriately per reason noted above.      Lila Villeda  Community Health Worker  [facility]  [phone]      *Connected Care Resource Team does NOT follow patient ongoing. Referrals are identified based on internal discharge reports and the outreach is to ensure patient has an understanding of their discharge instructions.

## 2024-11-13 NOTE — LETTER
11/13/2024      Connor Srinivasan  [address] [facility] [address]      Dear Colleague,    Thank you for referring your patient, Connor Srinivasan, to the [facility] [place]. Please see a copy of my visit note below.    The pleasure of seeing Mr. Connor Srinivasan in the vein clinic today.  He is a 79-year-old gentleman who comes to us for treatment of bleeding around his left ankle.  This has been going on and off for the past 7 years when he had his initial episode of bleeding.  He had noticed an area of a scab in that area which he picked and that resulted in the significant bleeding.  He did have going to the emergency department at that time.  Recently he was admitted under observation for a similar episode of bleeding in the same area.  After he went home last night it bled again.    On examination he has changes of chronic venous insufficiency with lipodermatosclerosis and hyperpigmentation in both lower extremities.  The area in question is located on the lateral lower leg just above the lateral malleolus.    Because of the recurrent hemorrhage my recommendation would be to proceed with injection sclerotherapy which would be done today.    Vein Clinic Procedure Note      Sclerotherapy    Date/Time: 11/13/2024 3:21 PM    Performed by: Ishaan Hong MD  Authorized by: Ishaan Hong MD    Type:  Medically Necessary  Vein:  Multiple Veins  Procedure side:  Left  Solution/Amount:  .5 POLIDOCANOL  Syringes:  4  Wrap/Hose:  Wraps      Again, thank you for allowing me to participate in the care of your patient.        Sincerely,        Ishaan Hong MD

## 2024-11-26 ENCOUNTER — OFFICE VISIT (OUTPATIENT)
Dept: INTERNAL MEDICINE | Facility: CLINIC | Age: 79
End: 2024-11-26
Payer: COMMERCIAL

## 2024-11-26 VITALS
DIASTOLIC BLOOD PRESSURE: 64 MMHG | BODY MASS INDEX: 29.86 KG/M2 | HEIGHT: 68 IN | HEART RATE: 79 BPM | RESPIRATION RATE: 18 BRPM | WEIGHT: 197 LBS | SYSTOLIC BLOOD PRESSURE: 118 MMHG | TEMPERATURE: 98 F

## 2024-11-26 DIAGNOSIS — I44.0 FIRST DEGREE ATRIOVENTRICULAR BLOCK: ICD-10-CM

## 2024-11-26 DIAGNOSIS — I83.892 BLEEDING FROM VARICOSE VEINS OF LOWER EXTREMITY, LEFT: Primary | ICD-10-CM

## 2024-11-26 DIAGNOSIS — R79.89 ELEVATED TROPONIN: ICD-10-CM

## 2024-11-26 DIAGNOSIS — D62 ANEMIA DUE TO BLOOD LOSS, ACUTE: ICD-10-CM

## 2024-11-26 DIAGNOSIS — E83.42 HYPOMAGNESEMIA: ICD-10-CM

## 2024-11-26 DIAGNOSIS — I10 ESSENTIAL HYPERTENSION: ICD-10-CM

## 2024-11-26 DIAGNOSIS — E78.5 HYPERLIPIDEMIA LDL GOAL <100: ICD-10-CM

## 2024-11-26 DIAGNOSIS — E11.9 TYPE 2 DIABETES MELLITUS WITHOUT COMPLICATION, WITHOUT LONG-TERM CURRENT USE OF INSULIN (H): ICD-10-CM

## 2024-11-26 DIAGNOSIS — R53.83 FATIGUE, UNSPECIFIED TYPE: ICD-10-CM

## 2024-11-26 LAB
ALBUMIN SERPL BCG-MCNC: 3.8 G/DL (ref 3.5–5.2)
ALP SERPL-CCNC: 123 U/L (ref 40–150)
ALT SERPL W P-5'-P-CCNC: 58 U/L (ref 0–70)
ANION GAP SERPL CALCULATED.3IONS-SCNC: 10 MMOL/L (ref 7–15)
AST SERPL W P-5'-P-CCNC: 42 U/L (ref 0–45)
BILIRUB SERPL-MCNC: 0.3 MG/DL
BUN SERPL-MCNC: 21.3 MG/DL (ref 8–23)
CALCIUM SERPL-MCNC: 8.7 MG/DL (ref 8.8–10.4)
CHLORIDE SERPL-SCNC: 106 MMOL/L (ref 98–107)
CHOLEST SERPL-MCNC: 102 MG/DL
CREAT SERPL-MCNC: 0.66 MG/DL (ref 0.67–1.17)
CREAT UR-MCNC: 147 MG/DL
EGFRCR SERPLBLD CKD-EPI 2021: >90 ML/MIN/1.73M2
ERYTHROCYTE [DISTWIDTH] IN BLOOD BY AUTOMATED COUNT: 12.4 % (ref 10–15)
EST. AVERAGE GLUCOSE BLD GHB EST-MCNC: 105 MG/DL
FASTING STATUS PATIENT QL REPORTED: YES
FASTING STATUS PATIENT QL REPORTED: YES
GLUCOSE SERPL-MCNC: 106 MG/DL (ref 70–99)
HBA1C MFR BLD: 5.3 % (ref 0–5.6)
HCO3 SERPL-SCNC: 23 MMOL/L (ref 22–29)
HCT VFR BLD AUTO: 34 % (ref 40–53)
HDLC SERPL-MCNC: 28 MG/DL
HGB BLD-MCNC: 11.4 G/DL (ref 13.3–17.7)
IRON BINDING CAPACITY (ROCHE): 390 UG/DL (ref 240–430)
IRON SATN MFR SERPL: 20 % (ref 15–46)
IRON SERPL-MCNC: 77 UG/DL (ref 61–157)
LDLC SERPL CALC-MCNC: 60 MG/DL
MAGNESIUM SERPL-MCNC: 2 MG/DL (ref 1.7–2.3)
MCH RBC QN AUTO: 33 PG (ref 26.5–33)
MCHC RBC AUTO-ENTMCNC: 33.5 G/DL (ref 31.5–36.5)
MCV RBC AUTO: 99 FL (ref 78–100)
MICROALBUMIN UR-MCNC: 31.6 MG/L
MICROALBUMIN/CREAT UR: 21.5 MG/G CR (ref 0–17)
NONHDLC SERPL-MCNC: 74 MG/DL
PLATELET # BLD AUTO: 133 10E3/UL (ref 150–450)
POTASSIUM SERPL-SCNC: 3.9 MMOL/L (ref 3.4–5.3)
PROT SERPL-MCNC: 5.7 G/DL (ref 6.4–8.3)
RBC # BLD AUTO: 3.45 10E6/UL (ref 4.4–5.9)
SODIUM SERPL-SCNC: 139 MMOL/L (ref 135–145)
TRIGL SERPL-MCNC: 69 MG/DL
TSH SERPL DL<=0.005 MIU/L-ACNC: 2.46 UIU/ML (ref 0.3–4.2)
WBC # BLD AUTO: 3.1 10E3/UL (ref 4–11)

## 2024-11-26 PROCEDURE — 99215 OFFICE O/P EST HI 40 MIN: CPT | Performed by: INTERNAL MEDICINE

## 2024-11-26 PROCEDURE — 83540 ASSAY OF IRON: CPT | Performed by: INTERNAL MEDICINE

## 2024-11-26 PROCEDURE — 99417 PROLNG OP E/M EACH 15 MIN: CPT | Performed by: INTERNAL MEDICINE

## 2024-11-26 PROCEDURE — 83550 IRON BINDING TEST: CPT | Performed by: INTERNAL MEDICINE

## 2024-11-26 PROCEDURE — 85027 COMPLETE CBC AUTOMATED: CPT | Performed by: INTERNAL MEDICINE

## 2024-11-26 PROCEDURE — 80061 LIPID PANEL: CPT | Performed by: INTERNAL MEDICINE

## 2024-11-26 PROCEDURE — 82043 UR ALBUMIN QUANTITATIVE: CPT | Performed by: INTERNAL MEDICINE

## 2024-11-26 PROCEDURE — 83735 ASSAY OF MAGNESIUM: CPT | Performed by: INTERNAL MEDICINE

## 2024-11-26 PROCEDURE — 36415 COLL VENOUS BLD VENIPUNCTURE: CPT | Performed by: INTERNAL MEDICINE

## 2024-11-26 PROCEDURE — 80053 COMPREHEN METABOLIC PANEL: CPT | Performed by: INTERNAL MEDICINE

## 2024-11-26 PROCEDURE — 82570 ASSAY OF URINE CREATININE: CPT | Performed by: INTERNAL MEDICINE

## 2024-11-26 PROCEDURE — 83036 HEMOGLOBIN GLYCOSYLATED A1C: CPT | Performed by: INTERNAL MEDICINE

## 2024-11-26 PROCEDURE — 84443 ASSAY THYROID STIM HORMONE: CPT | Performed by: INTERNAL MEDICINE

## 2024-11-26 NOTE — PATIENT INSTRUCTIONS
"Patient Instructions     We will update the requested hemoglobin as well as other labs that are due.     Dr Edwards will refill any needed meds.     See Dr Hong as planned.     Someone will contact you to help you to schedule a cardiology appointment. (We discussed limitations of a \"stress echo\", and barriers to ordering a stress nuclear test ourselves).      Lab results will be on MyChart.     See me in a year if feeling well (next Annual Wellness Visit).   "

## 2024-11-26 NOTE — PROGRESS NOTES
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     (D62) Anemia due to blood loss, acute  Comment: Update hemogram/iron studies.   Plan: CBC with platelets, Iron & Iron Binding         Capacity          (R53.83) Fatigue, unspecified type  Comment: See epic orders.   Plan: CBC with platelets, TSH with free T4 reflex          (R79.89) Elevated troponin  Comment: Discussed options. Recent resting echo showed suboptimal acoustic windows, and scheduled stress echo may not be diagnostic. Discussed Lexiscan, but indication of troponin elevation in absence of cardiovascular symptoms made scheduling problematic. We ultimately agreed to have him meet with cardiology to discuss.   Plan: Adult Cardiology Eval  Referral          (E11.9) Type 2 diabetes mellitus without complication, without long-term current use of insulin (H)  Comment: Well controlled. Continue current meds.   Plan: HEMOGLOBIN A1C, Albumin Random Urine         Quantitative with Creat Ratio          (E78.5) Hyperlipidemia LDL goal <100  Comment: Historically Well controlled. Continue current meds.   Plan: Lipid panel reflex to direct LDL Fasting,         Comprehensive metabolic panel (BMP + Alb, Alk         Phos, ALT, AST, Total. Bili, TP)          (I10) Essential hypertension  Comment: BP at target. Continue current meds.     (I44.0) First degree atrioventricular block  Comment: Noted on EKG.     (E83.42) Hypomagnesemia  Comment: Noted on repeat lab tests after hydration/diuresis. Will recheck.   Plan: Magnesium              MED REC REQUIRED  Post Medication Reconciliation Status: discharge medications reconciled and changed, per note/orders  BMI  Estimated body mass index is  "29.95 kg/m  as calculated from the following:    Height as of this encounter: 1.727 m (5' 8\").    Weight as of this encounter: 89.4 kg (197 lb).         Patient Instructions   Patient Instructions     We will update the requested hemoglobin as well as other labs that are due.     Dr Edwards will refill any needed meds.     See Dr Hong as planned.     Someone will contact you to help you to schedule a cardiology appointment. (We discussed limitations of a \"stress echo\", and barriers to ordering a stress nuclear test ourselves).      Lab results will be on Tintrit.     See me in a year if feeling well (next Annual Wellness Visit).     Yancy Washington is a 79 year old, presenting for the following health issues:  Hospital F/U        11/26/2024     8:46 AM   Additional Questions   Roomed by Camille SUBRAMANIAN CMA     [facility]       Hospital Follow-up Visit:    Hospital/Nursing Home/IP Rehab Facility: [facility]  Date of Admission: 11/11/2024  Date of Discharge: 11/12/2024  Reason(s) for Admission: Acute blood loss anemia due to variceal bleed  Elevated troponin   1st degree AV block  Was the patient in the ICU or did the patient experience delirium during hospitalization?  No  Do you have any other stressors you would like to discuss with your provider? No    Problems taking medications regularly:  None  Medication changes since discharge: None  Problems adhering to non-medication therapy:  None    Summary of hospitalization:  [facility] discharge summary reviewed  Diagnostic Tests/Treatments reviewed.  Follow up needed: Hemoglobin, consider stress echo.  Other Healthcare Providers Involved in Patient s Care:         None  Update since discharge: stable.         Plan of care communicated with patient           We discussed recent hospitalization 11/11-11/12/2024 for acute blood loss anemia due to lower leg variceal bleed, also elevated troponin levels.     He has a chronic scab in the skin of " "his lower leg and recalls \"picking\" the scab off years ago which resulted in vigorous bleeding.   The scab never completely healed.   Recently he scratched off the scab again, resulting in vigorous bleeding.   He presented to [facility] ED and was hospitalized overnight. Hemoglobin dropped from 13.1 to 11.5. He has taken iron tablets but avoided transfusion.     The patient was discharged on 11/12 but needed to return within an hour or two due to recurrent bleeding. He was treated with sclerotherapy by Dr Hong in his vascular surgery office. Dr Hong documented \"chronic venous insufficiency, lipodermatosclerosis and hyperpigmentation\". The patient is scheduled to return to Dr Hong's office again tomorrow.     Additionally, he had a bump in serum troponins to a peak of 132. This was not associated with any cardiovascular symptoms, including cough, dyspnea at rest or with activity, chest discomfort at rest or with activity, dizziness or palpitations.   Consideration was given to pre-discharge stress testing, but this was deferred.     We reviewed prior cardiac workup. Prior stress echo in May 2008 was negative for ischemia with suboptimal imaging. Stress nuclear testing on 5/7/2008 was negative for reversible ischemia.   A repeat stress nuclear test on 3/30/2012, ordered for exertional dyspnea, was likewise negative.     He has lost a substantial amount of weight in recent years, and his A1c, LDL-Cholesterol and BP have been well controlled.     Past medical, family and social histories as well as medications reviewed and updated as needed.    REVIEW OF SYSTEMS: The following systems have been completely reviewed and are negative except as noted above:   Constitutional, respiratory, cardiovascular, gastrointestinal, dermatologic, hematologic, endocrine, and neurologic systems.        Objective    /64   Pulse 79   Temp 98  F (36.7  C) (Oral)   Resp 18   Ht 1.727 m (5' 8\")   Wt 89.4 kg (197 lb)   " BMI 29.95 kg/m    Body mass index is 29.95 kg/m .    Physical Exam   GENERAL: alert and no distress  RESP: lungs clear to auscultation - no rales, rhonchi or wheezes  CV: regular rate and rhythm, normal S1 S2, no S3 or S4, no murmur, click or rub, no peripheral edema  MS: venous stasis changes with hyperpigmentation in lower extremities..           Signed Electronically by: Roverto Edwards MD,

## 2024-11-27 ENCOUNTER — OFFICE VISIT (OUTPATIENT)
Dept: VASCULAR SURGERY | Facility: CLINIC | Age: 79
End: 2024-11-27
Payer: COMMERCIAL

## 2024-11-27 DIAGNOSIS — I83.892 BLEEDING FROM VARICOSE VEINS OF LEFT LOWER EXTREMITY: Primary | ICD-10-CM

## 2024-11-27 PROCEDURE — G2211 COMPLEX E/M VISIT ADD ON: HCPCS | Performed by: SURGERY

## 2024-11-27 PROCEDURE — 99212 OFFICE O/P EST SF 10 MIN: CPT | Performed by: SURGERY

## 2024-11-27 NOTE — LETTER
11/27/2024      Connor Srinivasan  [address] [facility] [address]      Dear Colleague,    Thank you for referring your patient, Connor Srinivasan, to the [facility]. Please see a copy of my visit note below.    Mr. Srinivasan is a 79-year-old gentleman with a history of bleeding spider veins in the left lower extremity.  I last saw him on the 13th of this month and we had done an injection sclerotherapy of the veins which had recently bled.    He has been religiously wearing compression stockings since that time.  He has had no other episodes of bleeding.    On examination, however, there are still veins which are close to the surface and are at risk for bleeding.  My recommendation would be to electively treat them with injection sclerotherapy which would be medically necessary.  Today he did not bring his compression stockings and I have asked him to book an appointment with us and bring his compression stockings so we can do the additional treatments and prevent recurrent episodes of bleeding.      Again, thank you for allowing me to participate in the care of your patient.        Sincerely,        Ishaan Hong MD

## 2024-11-27 NOTE — PROGRESS NOTES
Mr. Srinivasan is a 79-year-old gentleman with a history of bleeding spider veins in the left lower extremity.  I last saw him on the 13th of this month and we had done an injection sclerotherapy of the veins which had recently bled.    He has been religiously wearing compression stockings since that time.  He has had no other episodes of bleeding.    On examination, however, there are still veins which are close to the surface and are at risk for bleeding.  My recommendation would be to electively treat them with injection sclerotherapy which would be medically necessary.  Today he did not bring his compression stockings and I have asked him to book an appointment with us and bring his compression stockings so we can do the additional treatments and prevent recurrent episodes of bleeding.

## 2024-12-09 LAB — CV ZIO PRELIM RESULTS: NORMAL

## 2024-12-10 NOTE — PLAN OF CARE
"PRIMARY DIAGNOSIS: VARICOSE VEIN BLEED   OUTPATIENT/OBSERVATION GOALS TO BE MET BEFORE DISCHARGE:  ADLs back to baseline: Yes    Activity and level of assistance: Ambulating independently.    Pain status: Pain free.    Return to near baseline physical activity: Yes     Discharge Planner Nurse   Safe discharge environment identified: Yes  Barriers to discharge: Yes       Entered by: Leticia Dowd RN 11/12/2024 4:12 AM   Patient alert and oriented. Vitally stable on room air. Independent in room. No more signs of bleeding- dressing in left shin clean, dry and intact. Patient denies pain. Cardiac tele in place- patient had 2 episodes of 2 second pauses- patient asymptomatic and vitally stable- Cross cover paged and notified. Patient calls appropriately.    Please review provider order for any additional goals.   Nurse to notify provider when observation goals have been met and patient is ready for discharge.        Goal Outcome Evaluation:      Plan of Care Reviewed With: patient    Overall Patient Progress: improvingOverall Patient Progress: improving       Problem: Adult Inpatient Plan of Care  Goal: Plan of Care Review  Description: The Plan of Care Review/Shift note should be completed every shift.  The Outcome Evaluation is a brief statement about your assessment that the patient is improving, declining, or no change.  This information will be displayed automatically on your shift  note.  11/11/2024 2357 by Leticia Dowd, RN  Outcome: Progressing  Flowsheets (Taken 11/11/2024 2357)  Plan of Care Reviewed With: patient  Overall Patient Progress: improving  11/11/2024 2215 by Leticia Dowd RN  Outcome: Progressing  Flowsheets (Taken 11/11/2024 2215)  Plan of Care Reviewed With: patient  Overall Patient Progress: improving  Goal: Patient-Specific Goal (Individualized)  Description: You can add care plan individualizations to a care plan. Examples of Individualization might be:  \"Parent requests to be called daily at " Patient notified fmla received. We also discussed any further imaging needed. I notified her of dr. Mcqueen response to last message. Her pft scheduled 12/17. Aware that we shouldn't need anything further for her to see pulm. She verbalized understanding    "9am for status\", \"I have a hard time hearing out of my right ear\", or \"Do not touch me to wake me up as it startles  me\".  11/11/2024 2357 by Leticia Dowd RN  Outcome: Progressing  11/11/2024 2215 by Leticia Dowd RN  Outcome: Progressing  Goal: Absence of Hospital-Acquired Illness or Injury  11/11/2024 2357 by Leticia Dowd RN  Outcome: Progressing  11/11/2024 2215 by Leticia Dowd RN  Outcome: Progressing  Intervention: Prevent Skin Injury  Recent Flowsheet Documentation  Taken 11/11/2024 2000 by Leticia Dowd RN  Body Position: position changed independently  Intervention: Prevent Infection  Recent Flowsheet Documentation  Taken 11/11/2024 2000 by Leticia Dowd RN  Infection Prevention:   rest/sleep promoted   hand hygiene promoted  Goal: Optimal Comfort and Wellbeing  11/11/2024 2357 by Leticia Dowd RN  Outcome: Progressing  11/11/2024 2215 by Leticia Dowd RN  Outcome: Progressing  Goal: Readiness for Transition of Care  11/11/2024 2357 by Leticia Dowd RN  Outcome: Progressing  11/11/2024 2215 by Leticia Dowd RN  Outcome: Progressing         "

## 2024-12-31 ENCOUNTER — OFFICE VISIT (OUTPATIENT)
Dept: CARDIOLOGY | Facility: CLINIC | Age: 79
End: 2024-12-31
Payer: COMMERCIAL

## 2024-12-31 VITALS
DIASTOLIC BLOOD PRESSURE: 60 MMHG | BODY MASS INDEX: 30.01 KG/M2 | HEIGHT: 68 IN | WEIGHT: 198 LBS | HEART RATE: 62 BPM | SYSTOLIC BLOOD PRESSURE: 120 MMHG

## 2024-12-31 DIAGNOSIS — R79.89 ELEVATED TROPONIN: ICD-10-CM

## 2024-12-31 DIAGNOSIS — I10 ESSENTIAL HYPERTENSION: ICD-10-CM

## 2024-12-31 DIAGNOSIS — I44.0 FIRST DEGREE ATRIOVENTRICULAR BLOCK: ICD-10-CM

## 2024-12-31 DIAGNOSIS — I21.4 NSTEMI (NON-ST ELEVATED MYOCARDIAL INFARCTION) (H): Primary | ICD-10-CM

## 2024-12-31 NOTE — LETTER
12/31/2024    Roverto Edwards MD, MD  [address] [facility] [address]  [facility] [address]    RE: Connor Srinivasan       Dear Colleague,     I had the pleasure of seeing Connor Srinivasan in the [facility].  CARDIOLOGY CLINIC CONSULTATION      REASON FOR CONSULT:   Elevated troponin in the setting of acute bleed; likely demand ischemia    PRIMARY CARE PHYSICIAN:  Roverto Edwards MD        History of Present Illness  Connor Srinivasan is an extremely pleasant 79 year old male here as a new patient to establish care.  His medical history is notable for marked first-degree AV block with brief asymptomatic nocturnal pauses, borderline dilated ascending aorta, hypertension, dyslipidemia, NIDDM 2, and chronic venous insufficiency with an episode of severe bleeding in 11/2024 after picking off a scab on his leg, s/p sclerotherapy with Dr. Hong on 11/13/2024.  No family history of premature coronary disease.  Patient is a never smoker.    He was referred to cardiology by his primary care physician, Dr. Edwards, for evaluation after an elevated troponin value was noted during his 11/2024 ER visit.  As described above, he had picked off a scab on his left leg which led to significant bleeding (EMS estimated around 200 cc).  He was brought into the hospital and noted to have significant drop in hemoglobin down to 10.8 (always normal prior).  Eventually the bleeding was stopped and he did undergo sclerotherapy as mentioned above with vascular surgery and has not had any recurrent bleeding.  However, during his admission high-sensitivity troponin values were monitored and peaked at 132.  He had no chest pains, excessive shortness of breath, etc. during that time, beforehand, or since.  An ECG showed no significant evidence of ischemia, but did have a significant prolonged AR interval of 384 ms.  Accordingly, he was discharged with a 14-day Zio patch which we personally reviewed together, and this  showed 2 brief pauses in the early morning hours (maximum 3.1 seconds), the shorter of which may have represented high-grade AV block although very briefly, and 33 episodes of SVT, longest of which lasted around 15 minutes and a heart rate of 107 bpm, though these SVT episodes were asymptomatic.  Lastly, an exercise stress echo was ordered, but has not yet been completed.  He tells me that he typically is very active working at [employer], and typically walks 6 to 7 miles per day during his shifts.  He does not do any heavy lifting.      Assessment & Plan    Marked first-degree AV block with brief nocturnal pauses (maximum 3.1 seconds on 14-day Zio patch from 12/2024), asymptomatic  Asymptomatic SVT  Borderline dilated ascending aorta (4.0 cm on 11/2024 TTE)  Elevated troponin in the setting of significant noncardiac stressor (acute blood loss anemia, psychological stress of the bleeding episode itself): Likely demand ischemia  Hypertension, well-controlled  Dyslipidemia  NIDDM 2  Chronic venous insufficiency with an episode of severe bleeding in 11/2024 after picking off a scab on his leg, s/p sclerotherapy with Dr. Hong on 11/13/2024  Never smoker      It was a pleasure to speak with Felix and his wife in clinic today.  We discussed multiple issues in regards to his Zio patch findings with both asymptomatic SVT episodes and brief asymptomatic pauses, he is asymptomatic elevated troponin during his acute blood loss, his borderline dilated ascending aorta on echo, and appropriate management of his blood pressure in light of his above findings.  Ultimately, I recommended the following:      -No high risk bradycardic findings requiring pacemaker at present.  That said, it would not be surprising for his conduction disease to gradually worsen over time, and eventually get to that point in the future.  Given this, I would recommend stopping his atenolol (decreased from 50 mg daily to 25 mg daily for 1 week, then stop  entirely), and he can be switched to an alternative agent if needed depending on his blood pressure results following atenolol discontinuation.  -We did discuss that his SVT episodes could worsen, and potentially become symptomatic, with stopping his beta-blocker, but if they do he will let us know and we can readdress at that time.  -Given his excellent exertional tolerance and complete lack of symptoms during his mild troponin elevation, I would attribute this to demand ischemia.  We talked about pros and cons of additional ischemic testing, and I think it is reasonable to proceed with exercise stress echo as was previously recommended.  If this is either normal or minimally abnormal, I would favor a conservative strategy given his lack of angina despite walking 6-7 miles per day.  -Continue aspirin 81 mg daily  -Continue simvastatin 40 mg daily  -Continue HCTZ 25 mg daily for now, though may need additional antihypertensive medication in the future if BP starts to climb following discontinuation of atenolol  -Discussed importance of avoiding heavy lifting/Valsalva given borderline dilated ascending aorta, as well as good BP control going forward.  He will follow regularly with Dr. Edwards for management of his BP        Follow-up: 1 year, with Zio patch beforehand, or sooner as needed.  Labs checked through PCP.        Mahesh Kelly MD  Interventional Cardiology  December 31, 2024      The longitudinal plan of care for the diagnosis(es)/condition(s) as documented were addressed during this visit. Due to the added complexity in care, I will continue to support Felix in the subsequent management and with ongoing continuity of care.        Medications  Current Outpatient Medications   Medication Sig Dispense Refill     ASPIRIN 81 MG OR TABS ONE DAILY 100 3     cetirizine (ZYRTEC) 10 MG tablet Take 10 mg by mouth daily.       hydrochlorothiazide (HYDRODIURIL) 25 MG tablet Take 1 tablet (25 mg) by mouth daily 90  tablet 3     simvastatin (ZOCOR) 40 MG tablet Take 1 tablet (40 mg) by mouth at bedtime 90 tablet 3     tadalafil (CIALIS) 20 MG tablet TAKE ONE TABLET BY MOUTH ONE TIME DAILY. TAKE AT LEAST 30 MINUTES BEFORE INTERCOURSE 20 tablet 1     No current facility-administered medications for this visit.     Allergies  Allergies   Allergen Reactions     No Known Drug Allergy          Physical Exam      BP: 120/60 Pulse: 62            Vital Signs with Ranges  Pulse:  [62] 62  BP: (120)/(60) 120/60  198 lbs 0 oz    Constitutional: Well-appearing, no acute distress  Respiratory: Normal respiratory effort, CTAB  Cardiovascular: RRR, no m/r/g.  JVP < 7 cm H2O.  There is trace bilateral LE edema.  Normal carotid upstrokes, no carotid bruits.      Thank you for allowing me to participate in the care of your patient.      Sincerely,     Mahesh Kelly MD     [facility]  cc:   Roverto Edwards MD  [facility] [address]  [place] [address]

## 2024-12-31 NOTE — PROGRESS NOTES
CARDIOLOGY CLINIC CONSULTATION      REASON FOR CONSULT:   Elevated troponin in the setting of acute bleed; likely demand ischemia    PRIMARY CARE PHYSICIAN:  Roverto Edwards MD        History of Present Illness   Connor Srinivasan is an extremely pleasant 79 year old male here as a new patient to establish care.  His medical history is notable for marked first-degree AV block with brief asymptomatic nocturnal pauses, borderline dilated ascending aorta, hypertension, dyslipidemia, NIDDM 2, and chronic venous insufficiency with an episode of severe bleeding in 11/2024 after picking off a scab on his leg, s/p sclerotherapy with Dr. Hong on 11/13/2024.  No family history of premature coronary disease.  Patient is a never smoker.    He was referred to cardiology by his primary care physician, Dr. Edwards, for evaluation after an elevated troponin value was noted during his 11/2024 ER visit.  As described above, he had picked off a scab on his left leg which led to significant bleeding (EMS estimated around 200 cc).  He was brought into the hospital and noted to have significant drop in hemoglobin down to 10.8 (always normal prior).  Eventually the bleeding was stopped and he did undergo sclerotherapy as mentioned above with vascular surgery and has not had any recurrent bleeding.  However, during his admission high-sensitivity troponin values were monitored and peaked at 132.  He had no chest pains, excessive shortness of breath, etc. during that time, beforehand, or since.  An ECG showed no significant evidence of ischemia, but did have a significant prolonged WA interval of 384 ms.  Accordingly, he was discharged with a 14-day Zio patch which we personally reviewed together, and this showed 2 brief pauses in the early morning hours (maximum 3.1 seconds), the shorter of which may have represented high-grade AV block although very briefly, and 33 episodes of SVT, longest of which lasted around 15 minutes and a  heart rate of 107 bpm, though these SVT episodes were asymptomatic.  Lastly, an exercise stress echo was ordered, but has not yet been completed.  He tells me that he typically is very active working at [employer], and typically walks 6 to 7 miles per day during his shifts.  He does not do any heavy lifting.      Assessment & Plan     Marked first-degree AV block with brief nocturnal pauses (maximum 3.1 seconds on 14-day Zio patch from 12/2024), asymptomatic  Asymptomatic SVT  Borderline dilated ascending aorta (4.0 cm on 11/2024 TTE)  Elevated troponin in the setting of significant noncardiac stressor (acute blood loss anemia, psychological stress of the bleeding episode itself): Likely demand ischemia  Hypertension, well-controlled  Dyslipidemia  NIDDM 2  Chronic venous insufficiency with an episode of severe bleeding in 11/2024 after picking off a scab on his leg, s/p sclerotherapy with Dr. Hong on 11/13/2024  Never smoker      It was a pleasure to speak with Felix and his wife in clinic today.  We discussed multiple issues in regards to his Zio patch findings with both asymptomatic SVT episodes and brief asymptomatic pauses, he is asymptomatic elevated troponin during his acute blood loss, his borderline dilated ascending aorta on echo, and appropriate management of his blood pressure in light of his above findings.  Ultimately, I recommended the following:      -No high risk bradycardic findings requiring pacemaker at present.  That said, it would not be surprising for his conduction disease to gradually worsen over time, and eventually get to that point in the future.  Given this, I would recommend stopping his atenolol (decreased from 50 mg daily to 25 mg daily for 1 week, then stop entirely), and he can be switched to an alternative agent if needed depending on his blood pressure results following atenolol discontinuation.  -We did discuss that his SVT episodes could worsen, and potentially become symptomatic,  with stopping his beta-blocker, but if they do he will let us know and we can readdress at that time.  -Given his excellent exertional tolerance and complete lack of symptoms during his mild troponin elevation, I would attribute this to demand ischemia.  We talked about pros and cons of additional ischemic testing, and I think it is reasonable to proceed with exercise stress echo as was previously recommended.  If this is either normal or minimally abnormal, I would favor a conservative strategy given his lack of angina despite walking 6-7 miles per day.  -Continue aspirin 81 mg daily  -Continue simvastatin 40 mg daily  -Continue HCTZ 25 mg daily for now, though may need additional antihypertensive medication in the future if BP starts to climb following discontinuation of atenolol  -Discussed importance of avoiding heavy lifting/Valsalva given borderline dilated ascending aorta, as well as good BP control going forward.  He will follow regularly with Dr. Edwards for management of his BP        Follow-up: 1 year, with Zio patch beforehand, or sooner as needed.  Labs checked through PCP.        Mahesh Kelly MD  Interventional Cardiology  December 31, 2024      The longitudinal plan of care for the diagnosis(es)/condition(s) as documented were addressed during this visit. Due to the added complexity in care, I will continue to support Felix in the subsequent management and with ongoing continuity of care.        Medications   Current Outpatient Medications   Medication Sig Dispense Refill    ASPIRIN 81 MG OR TABS ONE DAILY 100 3    cetirizine (ZYRTEC) 10 MG tablet Take 10 mg by mouth daily.      hydrochlorothiazide (HYDRODIURIL) 25 MG tablet Take 1 tablet (25 mg) by mouth daily 90 tablet 3    simvastatin (ZOCOR) 40 MG tablet Take 1 tablet (40 mg) by mouth at bedtime 90 tablet 3    tadalafil (CIALIS) 20 MG tablet TAKE ONE TABLET BY MOUTH ONE TIME DAILY. TAKE AT LEAST 30 MINUTES BEFORE INTERCOURSE 20 tablet 1     No  current facility-administered medications for this visit.     Allergies   Allergies   Allergen Reactions    No Known Drug Allergy          Physical Exam       BP: 120/60 Pulse: 62            Vital Signs with Ranges  Pulse:  [62] 62  BP: (120)/(60) 120/60  198 lbs 0 oz    Constitutional: Well-appearing, no acute distress  Respiratory: Normal respiratory effort, CTAB  Cardiovascular: RRR, no m/r/g.  JVP < 7 cm H2O.  There is trace bilateral LE edema.  Normal carotid upstrokes, no carotid bruits.

## 2025-01-07 ENCOUNTER — HOSPITAL ENCOUNTER (OUTPATIENT)
Dept: CARDIOLOGY | Facility: CLINIC | Age: 80
Discharge: HOME | End: 2025-01-07
Attending: INTERNAL MEDICINE
Payer: COMMERCIAL

## 2025-01-07 DIAGNOSIS — I21.4 NSTEMI (NON-ST ELEVATED MYOCARDIAL INFARCTION) (H): ICD-10-CM

## 2025-01-07 DIAGNOSIS — I10 ESSENTIAL HYPERTENSION: ICD-10-CM

## 2025-01-07 PROCEDURE — 93325 DOPPLER ECHO COLOR FLOW MAPG: CPT | Mod: TC

## 2025-01-07 PROCEDURE — 255N000002 HC RX 255 OP 636: Performed by: INTERNAL MEDICINE

## 2025-01-07 PROCEDURE — C8928 TTE W OR W/O FOL W/CON,STRES: HCPCS

## 2025-01-07 RX ADMIN — HUMAN ALBUMIN MICROSPHERES AND PERFLUTREN 3 ML: 10; .22 INJECTION, SOLUTION INTRAVENOUS at 10:07

## 2025-01-14 ENCOUNTER — TELEPHONE (OUTPATIENT)
Dept: CARDIOLOGY | Facility: CLINIC | Age: 80
End: 2025-01-14
Payer: COMMERCIAL

## 2025-01-14 DIAGNOSIS — I47.10 PAROXYSMAL SUPRAVENTRICULAR TACHYCARDIA: Primary | ICD-10-CM

## 2025-01-14 DIAGNOSIS — R00.2 PALPITATIONS: ICD-10-CM

## 2025-01-14 RX ORDER — ATENOLOL 25 MG/1
25 TABLET ORAL DAILY
Qty: 90 TABLET | Refills: 3 | Status: SHIPPED | OUTPATIENT
Start: 2025-01-14

## 2025-01-14 NOTE — TELEPHONE ENCOUNTER
Per Dr. Kelly:     Mahesh Kelly MD Lidke, Jen M, RN; CYNTHIA Merchant [facility] Heart Team 4  Caller: Unspecified (Today,  8:58 AM)  Thanks for letting me know.  Lets have him go back to 25 mg daily of atenolol, and lets check a 7-day Zio patch.  If he is having frequent symptomatic SVT episodes and we cannot appropriately balance the control of his SVT with the potential downside of bradycardia, we will need to consider ablation.    In the meantime, while we are working this up, obviously if he has severe episodes leading to syncope/near syncope, typical ER precautions apply    See response to similar patient call.  Only other addition would be that he should be aware that there can be some mild rebound heart rate elevation that is temporary when discontinuing or weaning off of beta-blockers, but this should improve with time.  That said, if the issue is that his SVT is now not controlled, then we we will need to consider ablation, etc. as discussed in my response to the patient call message.     Called pt and reviewed Dr. Kelly's recommendations. Pt verbalized understanding and agreement with plan. Pt would like to have monitor placed in clinic, order placed and sent pt [employer] message with phone number for scheduling. Prescription for atenolol 25 mg daily sent to pharmacy. Reviewed ER precautions if severe symptoms such as syncope/near syncope and requested pt contact us if any further concerns.

## 2025-01-14 NOTE — TELEPHONE ENCOUNTER
Patient is reporting frequent episodes of rapid HR up to the 120's, these episodes are not associated with exertion.  Patent reports associated dizziness.  BP's have been ranging 120's-140's systolic.  Per last appt note:    -We did discuss that his SVT episodes could worsen, and potentially become symptomatic, with stopping his beta-blocker, but if they do he will let us know and we can readdress at that time.      Routing to provider to advise.  Kusum Odonnell RN on 1/14/2025 at 9:00 AM

## 2025-02-17 ENCOUNTER — MYC REFILL (OUTPATIENT)
Dept: INTERNAL MEDICINE | Facility: CLINIC | Age: 80
End: 2025-02-17
Payer: COMMERCIAL

## 2025-02-17 DIAGNOSIS — E78.5 HYPERLIPIDEMIA LDL GOAL <130: ICD-10-CM

## 2025-02-17 RX ORDER — SIMVASTATIN 40 MG
40 TABLET ORAL AT BEDTIME
Qty: 90 TABLET | Refills: 2 | Status: SHIPPED | OUTPATIENT
Start: 2025-02-17

## 2025-02-24 ENCOUNTER — MYC REFILL (OUTPATIENT)
Dept: INTERNAL MEDICINE | Facility: CLINIC | Age: 80
End: 2025-02-24
Payer: COMMERCIAL

## 2025-02-24 ENCOUNTER — TELEPHONE (OUTPATIENT)
Dept: CARDIOLOGY | Facility: CLINIC | Age: 80
End: 2025-02-24
Payer: COMMERCIAL

## 2025-02-24 DIAGNOSIS — I10 ESSENTIAL HYPERTENSION: ICD-10-CM

## 2025-02-24 RX ORDER — HYDROCHLOROTHIAZIDE 25 MG/1
25 TABLET ORAL DAILY
Qty: 90 TABLET | Refills: 2 | Status: SHIPPED | OUTPATIENT
Start: 2025-02-24

## 2025-02-24 NOTE — TELEPHONE ENCOUNTER
RN called patient and reviewed with him the heart monitor results and Dr. Kelly's review below. Patient denies any symptoms at this time and says he will call back if symptoms develop so that we can place the referral to EP.         ----- Message from Mahesh Kelly sent at 2/22/2025 10:25 PM CST -----  7-day Ziopatch (on 25 mg daily atenolol) shows a (brief) minimum HR of 28 bpm, but no dangerous bradycardic rhythm problems or long pauses.  However, he did have an increase in SVT episodes (now 98 episodes, longest lasting 6 minutes).  Again, this is not necessarily dangerous, but if it is causing him excessive symptoms, it needs to be addressed.    Could you please call patient and see how he is doing?  If he is having bothersome symptoms with palpitations/racing HR, let's have him see EP to discuss EP study/ablation.    Thanks,  Shay

## 2025-05-01 ENCOUNTER — TELEPHONE (OUTPATIENT)
Dept: INTERNAL MEDICINE | Facility: CLINIC | Age: 80
End: 2025-05-01
Payer: COMMERCIAL

## 2025-05-01 NOTE — TELEPHONE ENCOUNTER
Patient Quality Outreach    Patient is due for the following:   Physical Annual Wellness Visit    Action(s) Taken:   Patient has upcoming appointment, these items will be addressed at that time. 12/05/2025 appointment with Dr. Edwards.    Type of outreach:    Chart review performed, no outreach needed.    Questions for provider review:    None         Camille [facility]  Chart routed to None.

## 2025-05-19 ENCOUNTER — RESULTS FOLLOW-UP (OUTPATIENT)
Dept: INTERNAL MEDICINE | Facility: CLINIC | Age: 80
End: 2025-05-19

## 2025-05-19 DIAGNOSIS — D64.9 ANEMIA, UNSPECIFIED TYPE: Primary | ICD-10-CM

## 2025-06-15 ENCOUNTER — HEALTH MAINTENANCE LETTER (OUTPATIENT)
Age: 80
End: 2025-06-15

## 2025-08-18 ENCOUNTER — HOSPITAL ENCOUNTER (EMERGENCY)
Facility: CLINIC | Age: 80
Discharge: HOME | End: 2025-08-18
Attending: STUDENT IN AN ORGANIZED HEALTH CARE EDUCATION/TRAINING PROGRAM | Admitting: STUDENT IN AN ORGANIZED HEALTH CARE EDUCATION/TRAINING PROGRAM
Payer: COMMERCIAL

## 2025-08-18 VITALS
TEMPERATURE: 98.8 F | OXYGEN SATURATION: 96 % | HEART RATE: 65 BPM | SYSTOLIC BLOOD PRESSURE: 135 MMHG | HEIGHT: 70 IN | DIASTOLIC BLOOD PRESSURE: 64 MMHG | BODY MASS INDEX: 27.86 KG/M2 | WEIGHT: 194.6 LBS | RESPIRATION RATE: 16 BRPM

## 2025-08-18 DIAGNOSIS — T14.8XXA BLEEDING FROM WOUND: Primary | ICD-10-CM

## 2025-08-18 PROCEDURE — 99283 EMERGENCY DEPT VISIT LOW MDM: CPT | Performed by: STUDENT IN AN ORGANIZED HEALTH CARE EDUCATION/TRAINING PROGRAM

## 2025-08-18 PROCEDURE — 12001 RPR S/N/AX/GEN/TRNK 2.5CM/<: CPT | Mod: LT

## 2025-08-18 RX ORDER — LIDOCAINE HYDROCHLORIDE AND EPINEPHRINE 10; 10 MG/ML; UG/ML
5 INJECTION, SOLUTION INFILTRATION; PERINEURAL ONCE
Status: DISCONTINUED | OUTPATIENT
Start: 2025-08-18 | End: 2025-08-18 | Stop reason: HOSPADM

## 2025-08-18 RX ORDER — TRANEXAMIC ACID 100 MG/ML
INJECTION, SOLUTION INTRAVENOUS ONCE
Status: DISCONTINUED | OUTPATIENT
Start: 2025-08-18 | End: 2025-08-18 | Stop reason: HOSPADM

## 2025-08-18 ASSESSMENT — ACTIVITIES OF DAILY LIVING (ADL)
ADLS_ACUITY_SCORE: 46

## (undated) DEVICE — GLOVE PROTEXIS W/NEU-THERA 8.0  2D73TE80

## (undated) DEVICE — PACK CATARACT CUSTOM SO DALE SEY32CTFCX

## (undated) DEVICE — TAPE MICROPORE 2"X1.5YD 1530S-2

## (undated) DEVICE — GOWN LG DISP 9515

## (undated) DEVICE — EYE SOL BSS 500ML

## (undated) DEVICE — EYE KNIFE SLIT XSTAR VISITEC 2.6MM 45DEG 373726

## (undated) DEVICE — GLOVE PROTEXIS MICRO 6.5  2D73PM65

## (undated) DEVICE — EYE PACK CUSTOM ANTERIOR 30DEG TIP CENTURION PPK6682-04

## (undated) DEVICE — GLOVE PROTEXIS MICRO 7.0  2D73PM70

## (undated) DEVICE — KIT ENDO TURNOVER/PROCEDURE W/CLEAN A SCOPE LINERS 103888

## (undated) DEVICE — GLOVE PROTEXIS MICRO 6.0  2D73PM60

## (undated) DEVICE — EYE PACK BVI READYPAK KIT #3

## (undated) DEVICE — EYE SHIELD PLASTIC

## (undated) DEVICE — LINEN TOWEL PACK X5 5464

## (undated) RX ORDER — FENTANYL CITRATE 50 UG/ML
INJECTION, SOLUTION INTRAMUSCULAR; INTRAVENOUS
Status: DISPENSED
Start: 2018-06-28

## (undated) RX ORDER — ONDANSETRON 2 MG/ML
INJECTION INTRAMUSCULAR; INTRAVENOUS
Status: DISPENSED
Start: 2017-06-12